# Patient Record
Sex: FEMALE | Race: WHITE | NOT HISPANIC OR LATINO
[De-identification: names, ages, dates, MRNs, and addresses within clinical notes are randomized per-mention and may not be internally consistent; named-entity substitution may affect disease eponyms.]

---

## 2017-06-20 ENCOUNTER — APPOINTMENT (OUTPATIENT)
Dept: CARDIOTHORACIC SURGERY | Facility: CLINIC | Age: 82
End: 2017-06-20

## 2017-06-20 VITALS
SYSTOLIC BLOOD PRESSURE: 124 MMHG | HEIGHT: 63 IN | TEMPERATURE: 97 F | RESPIRATION RATE: 17 BRPM | WEIGHT: 127 LBS | BODY MASS INDEX: 22.5 KG/M2 | HEART RATE: 57 BPM | DIASTOLIC BLOOD PRESSURE: 57 MMHG | OXYGEN SATURATION: 98 %

## 2017-07-10 ENCOUNTER — APPOINTMENT (OUTPATIENT)
Dept: CARDIOTHORACIC SURGERY | Facility: CLINIC | Age: 82
End: 2017-07-10

## 2017-07-10 VITALS
TEMPERATURE: 97.8 F | RESPIRATION RATE: 17 BRPM | OXYGEN SATURATION: 98 % | BODY MASS INDEX: 22.32 KG/M2 | DIASTOLIC BLOOD PRESSURE: 58 MMHG | HEART RATE: 59 BPM | SYSTOLIC BLOOD PRESSURE: 130 MMHG | WEIGHT: 126 LBS

## 2018-01-19 ENCOUNTER — OUTPATIENT (OUTPATIENT)
Dept: OUTPATIENT SERVICES | Facility: HOSPITAL | Age: 83
LOS: 1 days | End: 2018-01-19
Payer: MEDICARE

## 2018-01-19 PROCEDURE — 76536 US EXAM OF HEAD AND NECK: CPT | Mod: 26

## 2018-01-19 PROCEDURE — 76536 US EXAM OF HEAD AND NECK: CPT

## 2018-05-02 ENCOUNTER — TRANSCRIPTION ENCOUNTER (OUTPATIENT)
Age: 83
End: 2018-05-02

## 2018-05-02 ENCOUNTER — INPATIENT (INPATIENT)
Facility: HOSPITAL | Age: 83
LOS: 0 days | Discharge: HOME | DRG: 69 | End: 2018-05-03
Attending: PSYCHIATRY & NEUROLOGY | Admitting: PSYCHIATRY & NEUROLOGY
Payer: COMMERCIAL

## 2018-05-02 VITALS
HEIGHT: 62 IN | OXYGEN SATURATION: 98 % | DIASTOLIC BLOOD PRESSURE: 60 MMHG | SYSTOLIC BLOOD PRESSURE: 132 MMHG | RESPIRATION RATE: 20 BRPM | HEART RATE: 61 BPM | TEMPERATURE: 98 F | WEIGHT: 111.99 LBS

## 2018-05-02 DIAGNOSIS — C50.919 MALIGNANT NEOPLASM OF UNSPECIFIED SITE OF UNSPECIFIED FEMALE BREAST: ICD-10-CM

## 2018-05-02 DIAGNOSIS — Z29.9 ENCOUNTER FOR PROPHYLACTIC MEASURES, UNSPECIFIED: ICD-10-CM

## 2018-05-02 DIAGNOSIS — C21.0 MALIGNANT NEOPLASM OF ANUS, UNSPECIFIED: ICD-10-CM

## 2018-05-02 DIAGNOSIS — I48.91 UNSPECIFIED ATRIAL FIBRILLATION: ICD-10-CM

## 2018-05-02 DIAGNOSIS — R63.8 OTHER SYMPTOMS AND SIGNS CONCERNING FOOD AND FLUID INTAKE: ICD-10-CM

## 2018-05-02 DIAGNOSIS — C90.00 MULTIPLE MYELOMA NOT HAVING ACHIEVED REMISSION: ICD-10-CM

## 2018-05-02 LAB
ALBUMIN SERPL ELPH-MCNC: 4.1 G/DL — SIGNIFICANT CHANGE UP (ref 3.3–5)
ALP SERPL-CCNC: 75 U/L — SIGNIFICANT CHANGE UP (ref 40–120)
ALT FLD-CCNC: 16 U/L — SIGNIFICANT CHANGE UP (ref 10–45)
ANION GAP SERPL CALC-SCNC: 11 MMOL/L — SIGNIFICANT CHANGE UP (ref 5–17)
APPEARANCE UR: CLEAR — SIGNIFICANT CHANGE UP
APTT BLD: 35 SEC — SIGNIFICANT CHANGE UP (ref 27.5–37.4)
AST SERPL-CCNC: 22 U/L — SIGNIFICANT CHANGE UP (ref 10–40)
BASOPHILS NFR BLD AUTO: 0.5 % — SIGNIFICANT CHANGE UP (ref 0–2)
BILIRUB SERPL-MCNC: 0.5 MG/DL — SIGNIFICANT CHANGE UP (ref 0.2–1.2)
BILIRUB UR-MCNC: NEGATIVE — SIGNIFICANT CHANGE UP
BUN SERPL-MCNC: 26 MG/DL — HIGH (ref 7–23)
CALCIUM SERPL-MCNC: 9.2 MG/DL — SIGNIFICANT CHANGE UP (ref 8.4–10.5)
CHLORIDE SERPL-SCNC: 96 MMOL/L — SIGNIFICANT CHANGE UP (ref 96–108)
CHOLEST SERPL-MCNC: 182 MG/DL — SIGNIFICANT CHANGE UP (ref 10–199)
CK MB CFR SERPL CALC: 1.2 NG/ML — SIGNIFICANT CHANGE UP (ref 0–6.7)
CK SERPL-CCNC: 72 U/L — SIGNIFICANT CHANGE UP (ref 25–170)
CO2 SERPL-SCNC: 31 MMOL/L — SIGNIFICANT CHANGE UP (ref 22–31)
COLOR SPEC: YELLOW — SIGNIFICANT CHANGE UP
CREAT SERPL-MCNC: 1.77 MG/DL — HIGH (ref 0.5–1.3)
DIFF PNL FLD: NEGATIVE — SIGNIFICANT CHANGE UP
EOSINOPHIL NFR BLD AUTO: 1 % — SIGNIFICANT CHANGE UP (ref 0–6)
GLUCOSE SERPL-MCNC: 105 MG/DL — HIGH (ref 70–99)
GLUCOSE UR QL: NEGATIVE — SIGNIFICANT CHANGE UP
HBA1C BLD-MCNC: 5.5 % — SIGNIFICANT CHANGE UP (ref 4–5.6)
HCT VFR BLD CALC: 34.9 % — SIGNIFICANT CHANGE UP (ref 34.5–45)
HDLC SERPL-MCNC: 77 MG/DL — SIGNIFICANT CHANGE UP (ref 40–125)
HGB BLD-MCNC: 11.4 G/DL — LOW (ref 11.5–15.5)
INR BLD: 1.67 — HIGH (ref 0.88–1.16)
KETONES UR-MCNC: NEGATIVE — SIGNIFICANT CHANGE UP
LEUKOCYTE ESTERASE UR-ACNC: NEGATIVE — SIGNIFICANT CHANGE UP
LIPID PNL WITH DIRECT LDL SERPL: 91 MG/DL — SIGNIFICANT CHANGE UP
LYMPHOCYTES # BLD AUTO: 21.2 % — SIGNIFICANT CHANGE UP (ref 13–44)
MCHC RBC-ENTMCNC: 28.7 PG — SIGNIFICANT CHANGE UP (ref 27–34)
MCHC RBC-ENTMCNC: 32.7 G/DL — SIGNIFICANT CHANGE UP (ref 32–36)
MCV RBC AUTO: 87.9 FL — SIGNIFICANT CHANGE UP (ref 80–100)
MONOCYTES NFR BLD AUTO: 5 % — SIGNIFICANT CHANGE UP (ref 2–14)
NEUTROPHILS NFR BLD AUTO: 72.3 % — SIGNIFICANT CHANGE UP (ref 43–77)
NITRITE UR-MCNC: NEGATIVE — SIGNIFICANT CHANGE UP
PH UR: 7.5 — SIGNIFICANT CHANGE UP (ref 5–8)
PLATELET # BLD AUTO: 287 K/UL — SIGNIFICANT CHANGE UP (ref 150–400)
POTASSIUM SERPL-MCNC: 3.8 MMOL/L — SIGNIFICANT CHANGE UP (ref 3.5–5.3)
POTASSIUM SERPL-SCNC: 3.8 MMOL/L — SIGNIFICANT CHANGE UP (ref 3.5–5.3)
PROT SERPL-MCNC: 7.8 G/DL — SIGNIFICANT CHANGE UP (ref 6–8.3)
PROT UR-MCNC: NEGATIVE MG/DL — SIGNIFICANT CHANGE UP
PROTHROM AB SERPL-ACNC: 18.7 SEC — HIGH (ref 9.8–12.7)
RBC # BLD: 3.97 M/UL — SIGNIFICANT CHANGE UP (ref 3.8–5.2)
RBC # FLD: 15.1 % — SIGNIFICANT CHANGE UP (ref 10.3–16.9)
SODIUM SERPL-SCNC: 138 MMOL/L — SIGNIFICANT CHANGE UP (ref 135–145)
SP GR SPEC: 1.01 — SIGNIFICANT CHANGE UP (ref 1–1.03)
TOTAL CHOLESTEROL/HDL RATIO MEASUREMENT: 2.4 RATIO — LOW (ref 3.3–7.1)
TRIGL SERPL-MCNC: 69 MG/DL — SIGNIFICANT CHANGE UP (ref 10–149)
TROPONIN T SERPL-MCNC: <0.01 NG/ML — SIGNIFICANT CHANGE UP (ref 0–0.01)
UROBILINOGEN FLD QL: 0.2 E.U./DL — SIGNIFICANT CHANGE UP
WBC # BLD: 8 K/UL — SIGNIFICANT CHANGE UP (ref 3.8–10.5)
WBC # FLD AUTO: 8 K/UL — SIGNIFICANT CHANGE UP (ref 3.8–10.5)

## 2018-05-02 PROCEDURE — 99223 1ST HOSP IP/OBS HIGH 75: CPT

## 2018-05-02 PROCEDURE — 71045 X-RAY EXAM CHEST 1 VIEW: CPT | Mod: 26

## 2018-05-02 PROCEDURE — 99285 EMERGENCY DEPT VISIT HI MDM: CPT | Mod: 25

## 2018-05-02 PROCEDURE — 93010 ELECTROCARDIOGRAM REPORT: CPT

## 2018-05-02 PROCEDURE — 70450 CT HEAD/BRAIN W/O DYE: CPT | Mod: 26,59

## 2018-05-02 PROCEDURE — 70496 CT ANGIOGRAPHY HEAD: CPT | Mod: 26

## 2018-05-02 PROCEDURE — 70498 CT ANGIOGRAPHY NECK: CPT | Mod: 26

## 2018-05-02 RX ORDER — SODIUM CHLORIDE 9 MG/ML
1000 INJECTION INTRAMUSCULAR; INTRAVENOUS; SUBCUTANEOUS ONCE
Qty: 0 | Refills: 0 | Status: COMPLETED | OUTPATIENT
Start: 2018-05-02 | End: 2018-05-02

## 2018-05-02 RX ORDER — METOPROLOL TARTRATE 50 MG
25 TABLET ORAL DAILY
Qty: 0 | Refills: 0 | Status: DISCONTINUED | OUTPATIENT
Start: 2018-05-03 | End: 2018-05-03

## 2018-05-02 RX ORDER — ASPIRIN/CALCIUM CARB/MAGNESIUM 324 MG
325 TABLET ORAL ONCE
Qty: 0 | Refills: 0 | Status: COMPLETED | OUTPATIENT
Start: 2018-05-02 | End: 2018-05-02

## 2018-05-02 RX ORDER — APIXABAN 2.5 MG/1
2.5 TABLET, FILM COATED ORAL EVERY 12 HOURS
Qty: 0 | Refills: 0 | Status: DISCONTINUED | OUTPATIENT
Start: 2018-05-02 | End: 2018-05-03

## 2018-05-02 RX ORDER — SODIUM CHLORIDE 9 MG/ML
1000 INJECTION INTRAMUSCULAR; INTRAVENOUS; SUBCUTANEOUS
Qty: 0 | Refills: 0 | Status: DISCONTINUED | OUTPATIENT
Start: 2018-05-02 | End: 2018-05-03

## 2018-05-02 RX ORDER — ATORVASTATIN CALCIUM 80 MG/1
80 TABLET, FILM COATED ORAL ONCE
Qty: 0 | Refills: 0 | Status: COMPLETED | OUTPATIENT
Start: 2018-05-02 | End: 2018-05-02

## 2018-05-02 RX ORDER — ASPIRIN/CALCIUM CARB/MAGNESIUM 324 MG
81 TABLET ORAL DAILY
Qty: 0 | Refills: 0 | Status: DISCONTINUED | OUTPATIENT
Start: 2018-05-03 | End: 2018-05-03

## 2018-05-02 RX ORDER — CLOPIDOGREL BISULFATE 75 MG/1
75 TABLET, FILM COATED ORAL DAILY
Qty: 0 | Refills: 0 | Status: DISCONTINUED | OUTPATIENT
Start: 2018-05-03 | End: 2018-05-03

## 2018-05-02 RX ORDER — AMIODARONE HYDROCHLORIDE 400 MG/1
200 TABLET ORAL DAILY
Qty: 0 | Refills: 0 | Status: DISCONTINUED | OUTPATIENT
Start: 2018-05-03 | End: 2018-05-03

## 2018-05-02 RX ORDER — LEVOTHYROXINE SODIUM 125 MCG
25 TABLET ORAL DAILY
Qty: 0 | Refills: 0 | Status: DISCONTINUED | OUTPATIENT
Start: 2018-05-03 | End: 2018-05-03

## 2018-05-02 RX ADMIN — SODIUM CHLORIDE 100 MILLILITER(S): 9 INJECTION INTRAMUSCULAR; INTRAVENOUS; SUBCUTANEOUS at 19:02

## 2018-05-02 RX ADMIN — ATORVASTATIN CALCIUM 80 MILLIGRAM(S): 80 TABLET, FILM COATED ORAL at 19:02

## 2018-05-02 RX ADMIN — SODIUM CHLORIDE 1000 MILLILITER(S): 9 INJECTION INTRAMUSCULAR; INTRAVENOUS; SUBCUTANEOUS at 12:17

## 2018-05-02 RX ADMIN — APIXABAN 2.5 MILLIGRAM(S): 2.5 TABLET, FILM COATED ORAL at 20:57

## 2018-05-02 RX ADMIN — SODIUM CHLORIDE 90 MILLILITER(S): 9 INJECTION INTRAMUSCULAR; INTRAVENOUS; SUBCUTANEOUS at 23:49

## 2018-05-02 RX ADMIN — Medication 325 MILLIGRAM(S): at 19:01

## 2018-05-02 NOTE — H&P ADULT - PROBLEM SELECTOR PLAN 2
Receiving treatment -consider heme/onc consult Patient was due for four injections of velcade q1week for four weeks. She was due for second dosage this AM when felt unwell and presented to the ED. Was also due for zometa (1x every 4 weeks) but did not receive due to presentation in ED.   -consider heme/onc consult; zometa, velcade due

## 2018-05-02 NOTE — ED ADULT NURSE NOTE - PMH
Atrial fibrillation  on apixaban  Malignant neoplasm of anus  s/p radiation  Malignant neoplasm of female breast  s/p radiation, lumpectomy 2013  Multiple myeloma

## 2018-05-02 NOTE — CONSULT NOTE ADULT - ASSESSMENT
84y/o F w/ PMHx of Afib (on Eliquis), HLD, breast cancer (s/p lumpectomy), and anal cancer (s/p radiation/chemo) and PVD/varicose veins, MM, presents with gait instability. CT head and CTA angio head/neck unremarkable for CVA. 84y/o F w/ PMHx of Afib (on Eliquis), HLD, breast cancer (s/p lumpectomy), and anal cancer (s/p radiation/chemo) and PVD/varicose veins, MM, presents with gait instability. CT head and CTA angio head/neck unremarkable for CVA. NIHSS 0.

## 2018-05-02 NOTE — ED ADULT TRIAGE NOTE - CHIEF COMPLAINT QUOTE
Patient c/o weakness on both legs , lack of balance and feels  unsteady since 2pm yesterday . Denies any headache nor dizziness . No slurred speech , arm drift nor facial droop noted .

## 2018-05-02 NOTE — DISCHARGE NOTE ADULT - CARE PROVIDER_API CALL
Diandra Curiel), Neurology; Vascular Neurology  [address] [place] [address]  Phone: [phone]  Fax: [phone]

## 2018-05-02 NOTE — CONSULT NOTE ADULT - PROBLEM SELECTOR RECOMMENDATION 9
CT head brain stroke protocol and CTA head/neck without signs of acute hemorrhage or infarct. Pt with normal gait on my exam. Also normotensive on admission. NIHSS 0. Last known normal ~20 hours ago.  - No TPA administered    Dispo pending Stroke Attending Evaluation. CT head brain stroke protocol and CTA head/neck without signs of acute hemorrhage or infarct. Pt with normal gait on my exam. Also normotensive on admission. NIHSS 0. Last known normal ~20 hours ago.  - No TPA administered  - Concern for ataxic stroke, further workup warranted.  - Admit to [facility]    Discussed with Stroke Attending. CT head brain stroke protocol and CTA head/neck without signs of acute hemorrhage or infarct. Pt with normal gait on my exam. Also normotensive on admission. NIHSS 0. Last known normal ~20 hours ago.  - No TPA administered  - Per Dr. Curiel, concern for ataxic stroke despite no acute findings on imaging thus far, further workup warranted.  - Aspirin, Plavix, Statin  - TTE  - fluids  - MRI brain  - Admit to [facility]    Discussed with Stroke Attending.

## 2018-05-02 NOTE — DISCHARGE NOTE ADULT - ADDITIONAL INSTRUCTIONS
Please follow up with Dr. Curiel Please follow up with Dr. Curiel within one week of discharge. Dr. Curiel's office will contact you to schedule a convenient appointment date and time.

## 2018-05-02 NOTE — H&P ADULT - ASSESSMENT
Patient is an 85 year old female with past medical history of Afib (on Eliquis), HLD, breast cancer (s/p lumpectomy), and anal cancer (s/p radiation/chemo) and PVD/varicose veins, MM, presents with gait instability c/f small vessel stroke.

## 2018-05-02 NOTE — ED ADULT NURSE NOTE - CHPI ED SYMPTOMS NEG
no numbness/no loss of consciousness/no nausea/no dizziness/no vomiting/no change in level of consciousness/no fever/no blurred vision/no confusion

## 2018-05-02 NOTE — H&P ADULT - NSHPPHYSICALEXAM_GEN_ALL_CORE
General:  HEENt:  neck:  cardio:  pulmonary:  abdomen:  vascular:  extremities:  neuro: General: No acute distress, comfortable sitting on stretcher  HEENt: NC/AT, EOMI, PERRLA, MMM  neck: no jvd, supple  cardio: Regular rhythm, regular rate, S1/S2, -m/g/r  pulmonary: clear to ausculatation bilaterally  abdomen: soft, nontender, bowel sounds normoactive x4  vascular: 2+ pulses  extremities: swelling in the left ankle where she says she suffered an injury  neuro: AAOx3, CN2-12 grossly intact, no disdiodokinesis, no dysmetria, no dysarthria. no focal deficits.

## 2018-05-02 NOTE — DISCHARGE NOTE ADULT - PLAN OF CARE
To minimize risk of recurrence You came to the hospital because you were feeling unusually unstable while walking. While in the hospital your head imaging did not suggest that you had a stroke, however your symptoms suggest you may have had a small, self resolving stroke like occurrence called a "transient ischemic attack," or TIA. Please follow up with Dr. Curiel who will continue to monitor your progress and continue to take your daily aspirin, plavix, atorvostatin (now at an increased dose). Please continue to take your daily eliquis, metoprolol, and amiodarone (every other day) and follow up with your doctor who will continue to monitor your heart rhythm and medications. Please follow up with your outpatient hematologist-oncologist to continue your infusion treatments for multiple myeloma.

## 2018-05-02 NOTE — DISCHARGE NOTE ADULT - OTHER SIGNIFICANT FINDINGS
< from: CT Angio Head w/ IV Cont (05.02.18 @ 11:48) >  IMPRESSION:     Unremarkable CTA examination of the brain.      < end of copied text >  < from: CT Angio Neck w/ IV Cont (05.02.18 @ 11:48) >  FINDINGS:     The internal carotid arteries at the skull base and intracranial   compartment are patent and symmetric caliber. The anterior and middle   cerebral arteries appear patent throughout the first and second order   branches without occlusion or high-grade stenosis.    The intracranial vertebral arteries are patent and symmetric caliber. The   basilar artery and both posterior cerebral arteries are patent throughout   with relative hypoplasia of the right P1 segment.    No intracranial aneurysm or high-flow vascular malformation is shown.    IMPRESSION:     Unremarkable CTA examination of the brain.      < end of copied text >  Lipid Profile (05.03.18 @ 06:45)    Total Cholesterol/HDL Ratio Measurement: 2.4 RATIO    Cholesterol, Serum: 161 mg/dL    Triglycerides, Serum: 57 mg/dL    HDL Cholesterol, Serum: 67 mg/dL    Direct LDL: 83: LDL Cholesterol --- Interpretive Comment (for adults 18 and over)  Below 70                  Ideal for people at very high risk of heart  disease  Below 100                Ideal for people at risk of heart disease  100 - 129                  [place]  130 - 159                  Borderline high  160 - 189                  High  190 and Above        Very high mg/dL    Thyroid Stimulating Hormone, Serum in AM (05.03.18 @ 06:45)    Thyroid Stimulating Hormone, Serum: 5.934 uIU/mL    Hemoglobin A1C, Whole Blood (05.02.18 @ 11:39)    Hemoglobin A1C, Whole Blood: 5.5: Method: HPLC, NGSP Level 1 Certified       Reference Range                4.0-5.6%       High risk (prediabetic)        5.7-6.4%       Diabetic, diagnostic             >=6.5%       ADA diabetic treatment goal       <7.0%  The Hemoglobin A1c referenceranges are based on the 2010 recommendations  of  The American Diabetes Association.  Interpretation may vary for children  and  adolescent % < from: CT Angio Head w/ IV Cont (05.02.18 @ 11:48) >  IMPRESSION:   Unremarkable CTA examination of the brain.  < end of copied text >    < from: CT Angio Neck w/ IV Cont (05.02.18 @ 11:48) >  FINDINGS:     The internal carotid arteries at the skull base and intracranial   compartment are patent and symmetric caliber. The anterior and middle   cerebral arteries appear patent throughout the first and second order   branches without occlusion or high-grade stenosis.    The intracranial vertebral arteries are patent and symmetric caliber. The   basilar artery and both posterior cerebral arteries are patent throughout   with relative hypoplasia of the right P1 segment.    No intracranial aneurysm or high-flow vascular malformation is shown.    IMPRESSION:   Unremarkable CTA examination of the brain.    < end of copied text >    Lipid Profile (05.03.18 @ 06:45)    Total Cholesterol/HDL Ratio Measurement: 2.4 RATIO    Cholesterol, Serum: 161 mg/dL    Triglycerides, Serum: 57 mg/dL    HDL Cholesterol, Serum: 67 mg/dL    Direct LDL: 83: LDL Cholesterol --- Interpretive Comment (for adults 18 and over)  Below 70                  Ideal for people at very high risk of heart  disease  Below 100                Ideal for people at risk of heart disease  100 - 129                  [place]  130 - 159                  Borderline high  160 - 189                  High  190 and Above        Very high mg/dL    Thyroid Stimulating Hormone, Serum in AM (05.03.18 @ 06:45)    Thyroid Stimulating Hormone, Serum: 5.934 uIU/mL    Hemoglobin A1C, Whole Blood (05.02.18 @ 11:39)    Hemoglobin A1C, Whole Blood: 5.5: Method: HPLC, NGSP Level 1 Certified       Reference Range                4.0-5.6%       High risk (prediabetic)        5.7-6.4%       Diabetic, diagnostic             >=6.5%       ADA diabetic treatment goal       <7.0%  The Hemoglobin A1c referenceranges are based on the 2010 recommendations  of  The American Diabetes Association.  Interpretation may vary for children  and  adolescent %

## 2018-05-02 NOTE — H&P ADULT - PROBLEM SELECTOR PLAN 3
Monitor on tele, currently asymptomatic.  f/u ECG  -holding eliquis, lopressor, amiodarone home; eliquis, lopressor, amiodarone  Monitor on tele, currently asymptomatic.  -f/u ECG -Monitor on tele, currently asymptomatic.  -f/u ECG  -continue home lopressor, amiodarone   -continue home eliquis    #hypothyroidism  continue home synthroid -Monitor on tele, currently asymptomatic.  -f/u ECG  -continue home toprol, amiodarone   -continue home eliquis    #hypothyroidism  continue home synthroid

## 2018-05-02 NOTE — DISCHARGE NOTE ADULT - HOSPITAL COURSE
Patient is an 85 year old female with past medical history of afib (on eliquis), HLD, breast and anal cancers, PVD, varicose veins and active multiple myeloma who presented with several hours of feeling uncharacteristically unsteady. She presented to the [facility] ED where her subsequent head and neck imaging were negative for stroke. Her symptoms were suspicious for TIA. She remained stable and was released after one day of observation after deferring her cardiac echo. She is planning to have her MRI performed in the outpatient setting and will follow up with Dr. Cuirel after discharge.

## 2018-05-02 NOTE — DISCHARGE NOTE ADULT - PATIENT PORTAL LINK FT
You can access the [facility], offered by [facility], by registering with the following website: [email or web address]

## 2018-05-02 NOTE — H&P ADULT - NSHPLABSRESULTS_GEN_ALL_CORE
Complete Blood Count + Automated Diff (05.02.18 @ 11:39)    WBC Count: 8.0 K/uL    RBC Count: 3.97 M/uL    Hemoglobin: 11.4 g/dL    Hematocrit: 34.9 %    Mean Cell Volume: 87.9 fL    Mean Cell Hemoglobin: 28.7 pg    Mean Cell Hemoglobin Conc: 32.7 g/dL    Red Cell Distrib Width: 15.1 %    Platelet Count - Automated: 287 K/uL    Auto Neutrophil %: 72.3: Please note that absolute numbers are not reported at [facility]. %    Auto Lymphocyte %: 21.2: Please note that absolute numbers are not reported at [facility]. %    Auto Monocyte %: 5.0: Please note that absolute numbers are not reported at [facility]. %    Auto Eosinophil %: 1.0: Please note that absolute numbers are not reported at [facility]. %    Auto Basophil %: 0.5: Please note that absolute numbers are not reported at [facility]. %    Hemoglobin A1C, Whole Blood (05.02.18 @ 11:39)    Hemoglobin A1C, Whole Blood: 5.5: Method: HPLC, NGSP Level 1 Certified       Reference Range                4.0-5.6%       High risk (prediabetic)        5.7-6.4%       Diabetic, diagnostic             >=6.5%       ADA diabetic treatment goal       <7.0%  The Hemoglobin A1c referenceranges are based on the 2010 recommendations  of  The American Diabetes Association.  Interpretation may vary for children  and  adolescent %

## 2018-05-02 NOTE — DISCHARGE NOTE ADULT - CARE PLAN
Principal Discharge DX:	CVA (cerebral vascular accident)  Secondary Diagnosis:	Atrial fibrillation Principal Discharge DX:	TIA (transient ischemic attack)  Goal:	To minimize risk of recurrence  Assessment and plan of treatment:	You came to the hospital because you were feeling unusually unstable while walking. While in the hospital your head imaging did not suggest that you had a stroke, however your symptoms suggest you may have had a small, self resolving stroke like occurrence called a "transient ischemic attack," or TIA. Please follow up with Dr. Curiel who will continue to monitor your progress and continue to take your daily aspirin, plavix, atorvostatin (now at an increased dose).  Secondary Diagnosis:	Atrial fibrillation  Assessment and plan of treatment:	Please continue to take your daily eliquis, metoprolol, and amiodarone (every other day) and follow up with your doctor who will continue to monitor your heart rhythm and medications.  Secondary Diagnosis:	Multiple myeloma  Assessment and plan of treatment:	Please follow up with your outpatient hematologist-oncologist to continue your infusion treatments for multiple myeloma.

## 2018-05-02 NOTE — H&P ADULT - PROBLEM SELECTOR PLAN 7
F: No IVF  E: Replete as appropriate  N: NPO  Ppx: SCDs    Lines: peripheral  dispo: 7[facility] F: No IVF  E: Replete as appropriate  N: Regular diet  Ppx: SCDs    Lines: peripheral  dispo: 7[facility] F: No IVF  E: Replete as appropriate  N: DASH  Ppx: SCDs    Lines: peripheral  dispo: 7[facility]

## 2018-05-02 NOTE — CONSULT NOTE ADULT - SUBJECTIVE AND OBJECTIVE BOX
STROKE RESIDENT INITIAL CONSULT NOTE    CHIEF COMPLAINT:  unsteady gait    HPI:       PAST MEDICAL & SURGICAL HISTORY:  Malignant neoplasm of anus: s/p radiation  Malignant neoplasm of female breast: s/p radiation, lumpectomy 2013  Atrial fibrillation: on apixaban      REVIEW OF SYSTEMS:  As per HPI, otherwise negative for Constitutional, Eyes, Ears/Nose/Mouth/Throat, Neck, Cardiovascular, Respiratory, Gastrointestinal, Genitourinary, Skin, Endocrine, Musculoskeletal, Psychiatric, and Hematologic/Lymphatic.    MEDICATIONS  (STANDING):    MEDICATIONS  (PRN):      Allergies    No Known Allergies    Intolerances        FAMILY HISTORY:      SOCIAL HISTORY:  Living Situation:  Occupation:  Tobacco:  Alcohol:  Drug use:      VITAL SIGNS:  Vital Signs Last 24 Hrs  T(C): 36.4 (02 May 2018 10:40), Max: 36.4 (02 May 2018 10:40)  T(F): 97.6 (02 May 2018 10:40), Max: 97.6 (02 May 2018 10:40)  HR: 61 (02 May 2018 10:40) (61 - 61)  BP: 132/60 (02 May 2018 10:40) (132/60 - 132/60)  BP(mean): --  RR: 20 (02 May 2018 10:40) (20 - 20)  SpO2: 98% (02 May 2018 10:40) (98% - 98%)    PHYSICAL EXAMINATION:  General: Well-developed, well nourished, in no acute distress.  Eyes: Conjunctiva and sclera clear.  Cardiovascular: Regular rate and rhythm; S1 and S2 Normal; No murmurs, gallops or rubs.  Neurologic:  - Mental Status:  Alert, awake, oriented to person, place, and time; Speech is fluent with intact naming, repetition, and comprehension; Immediate recall is 3/3 words and delayed recall is 3/3 words at 5 minutes; Able to spell WORLD backwards and perform serial 7 subtraction; Able to read and write a sentence; Able to copy a cube; Good overall fund of knowledge.  - Cranial Nerves II-XII:    II:  Visual acuity is 20/20 bilaterally; Visual fields are full to confrontation; Fundoscopic exam is normal with sharp discs; Pupils are equal, round, and reactive to light.  III, IV, VI:  Extraocular movements are intact without nystagmus.  V:  Facial sensation is intact in the V1-V3 distribution bilaterally.  VII:  Face is symmetric with normal eye closure and smile  VIII:  Hearing is intact to finger rub.  IX, X:  Uvula is midline and soft palate rises symmetrically  XI:  Head turning and shoulder shrug are intact.  XII:  Tongue protrudes in the midline.  - Motor:  Strength is 5/5 throughout.  There is no pronator drift.  Normal muscle bulk and tone throughout.  - Reflexes:  2+ and symmetric at the biceps, triceps, brachioradialis, knees, and ankles.  Plantar responses flexor.  - Sensory:  Intact to light touch, pin prick, vibration, and joint-position sense throughout.  - Coordination:  Finger-nose-finger and heel-knee-shin intact without dysmetria.  Rapid alternating hand movements intact.  - Gait:   Normal steps, base, arm swing, and turning.  Heel and toe walking are normal.  Tandem gait is normal.  Romberg testing is negative.    LABS:                RADIOLOGY & ADDITIONAL STUDIES:      IMPRESSION & PLAN: **STROKE CODE CONSULT NOTE**    Last known well time/Time of onset of symptoms:    HPI: 84y/o F w/ PMHx of Afib (on Eliquis), HLD, breast cancer (s/p lumpectomy), and anal cancer (s/p radiation/chemo) and PVD/varicose veins, MM, presents with gait instability. Patient states that 18 2pm she was walking around at the [place] and felt unsteady on her feet. She states that she felt as if she needed to hold onto something. She denies dizziness or room-spinning. She states that this happened to her once many years ago and they did a bunch of CTs and ultrasounds of her neck and never found a clot. She currently feels well and has no complaints. Stroke code called due to gait instability per attending exam.    PAST MEDICAL & SURGICAL HISTORY:  Multiple myeloma  Malignant neoplasm of anus: s/p radiation  Malignant neoplasm of female breast: s/p radiation, lumpectomy   Atrial fibrillation: on apixaban      FAMILY HISTORY: non-contributory      SOCIAL HISTORY:     ROS:  Constitutional: No fever, weight loss or fatigue  Eyes: No eye pain, visual disturbances, or discharge  ENMT:  No difficulty hearing, tinnitus, vertigo; No sinus or throat pain  Neck: No pain or stiffness  Respiratory: No cough, wheezing, chills or hemoptysis  Cardiovascular: No chest pain, palpitations, shortness of breath, dizziness or leg swelling  Gastrointestinal: No abdominal pain. No nausea, vomiting or hematemesis; No diarrhea or constipation. Nohematochezia.  Genitourinary: No dysuria, frequency, hematuria or incontinence  Neurological: As per HPI  Skin: No itching, burning, rashes or lesions   Endocrine: No heat or cold intolerance; No hair loss  Musculoskeletal: No joint pain or swelling; No muscle, back or extremity pain  Psychiatric: No depression, anxiety, mood swings or difficulty sleeping  Heme/Lymph: No easy bruising or bleeding gums    MEDICATIONS  (STANDING):    MEDICATIONS  (PRN):      Allergies    No Known Allergies    Intolerances        Vital Signs Last 24 Hrs  T(C): 36.4 (02 May 2018 10:40), Max: 36.4 (02 May 2018 10:40)  T(F): 97.6 (02 May 2018 10:40), Max: 97.6 (02 May 2018 10:40)  HR: 61 (02 May 2018 10:40) (61 - 61)  BP: 132/60 (02 May 2018 10:40) (132/60 - 132/60)  BP(mean): --  RR: 20 (02 May 2018 10:40) (20 - 20)  SpO2: 98% (02 May 2018 10:40) (98% - 98%)    PHYSICAL EXAM:  Constitutional: WDWN; NAD  Cardiovascular: RRR, no appreciable murmurs; no carotid bruits  Neurologic:  Mental status: Awake, alert and oriented x3.  Recent and remote memory intact.  Naming, repetition and comprehension intact.  Attention/concentration intact.  No dysarthria, no aphasia.  Fund of knowledge appropriate.    Cranial nerves: Pupils equally round and reactive to light, visual fields full, no nystagmus, extraocular muscles intact, V1 through V3 intact bilaterally and symmetric, face symmetric, hearing intact to finger rub, palate elevation symmetric, tongue was midline, sternocleidomastoid/shoulder shrug strength bilaterally 5/5.    Motor:  Normal bulk and tone, strength 5/5 in bilateral upper and lower extremities.   strength 5/5.   Sensation: Intact to light touch.  No neglect.   Coordination: No dysmetria on finger-to-nose and heel-to-shin.  No clumsiness.  Reflexes: 2+ in upper and lower extremities, absent Babinski bilaterally  Gait: Narrow and steady. No ataxia. Romberg negative    NIHSS: 0    Fingerstick Blood Glucose: CAPILLARY BLOOD GLUCOSE  80 (02 May 2018 11:35)      POCT Blood Glucose.: 80 mg/dL (02 May 2018 11:14)       LABS:                        11.4   8.0   )-----------( 287      ( 02 May 2018 11:39 )             34.9     05-    138  |  96  |  26<H>  ----------------------------<  105<H>  3.8   |  31  |  1.77<H>    Ca    9.2      02 May 2018 11:39    TPro  7.8  /  Alb  4.1  /  TBili  0.5  /  DBili  x   /  AST  22  /  ALT  16  /  AlkPhos  75  05-    PT/INR - ( 02 May 2018 11:39 )   PT: 18.7 sec;   INR: 1.67          PTT - ( 02 May 2018 11:39 )  PTT:35.0 sec  CARDIAC MARKERS ( 02 May 2018 11:39 )  x     / <0.01 ng/mL / 72 U/L / x     / 1.2 ng/mL      Urinalysis Basic - ( 02 May 2018 11:40 )    Color: Yellow / Appearance: Clear / S.010 / pH: x  Gluc: x / Ketone: NEGATIVE  / Bili: Negative / Urobili: 0.2 E.U./dL   Blood: x / Protein: NEGATIVE mg/dL / Nitrite: NEGATIVE   Leuk Esterase: NEGATIVE / RBC: x / WBC x   Sq Epi: x / Non Sq Epi: x / Bacteria: x        RADIOLOGY & ADDITIONAL STUDIES:  < from: CT Brain Stroke Protocol (18 @ 11:47) >  IMPRESSION:    No acute findings.    < end of copied text >    < from: CT Angio Head w/ IV Cont (18 @ 11:48) >  FINDINGS:     The internal carotid arteries at the skull base and intracranial   compartment are patent and symmetric caliber. The anterior and middle   cerebral arteries appear patent throughout the first and second order   branches without occlusion or high-grade stenosis.    The intracranial vertebral arteries are patent and symmetric caliber. The   basilar artery and both posterior cerebral arteries are patent throughout   with relative hypoplasia of the right P1 segment.    No intracranial aneurysm or high-flow vascular malformation is shown.    IMPRESSION:     Unremarkable CTA examination of the brain.      PROCEDURE: CTA neck with intravenous contrast.    INDICATION: Unsteadiness, weakness. Stroke code.    TECHNIQUE: Multiple axial thin section were obtained through the neck   following the intravenous bolus injection of Optiray-350. MIP series are   provided.    COMPARISON: No relevant prior study    FINDINGS:     The aortic arch is patent and normal caliber and shows mild peripheral   arthroscopic calcification. The great vessel origins are widely patent.    The bilateral vertebral arteries are patent at the origins and throughout   their cervical course bilaterally.    The common carotid arteries are patent throughout with patent and   symmetric carotid bifurcations. There is no hemodynamically significant   stenosis of either internal carotid artery in the neck with tortuosity   and mild kinking on the right.    There is expansile and destructive mass of the distal right clavicle.   Osseous hemangioma is present in the T1 vertebral body. There is a   well-defined sclerotic focus at the right lateral third rib that is   nonspecific. The thyroid gland shows few indeterminate nodules.    IMPRESSION:     No vertebral or carotid artery steno-occlusive disease in the neck.    Right distal clavicle mass. After discussing with Dr. Abad, the patient   has multiple myeloma and is being followed as an outpatient. Suggest   referral to hematology-oncology for further management.    < end of copied text >        IV-tPA (Y/N):                       N            Bolus time:  Reason IV-tPA not given: Out of window, mild symptoms **STROKE CODE CONSULT NOTE**    Last known well time/Time of onset of symptoms: 18 2pm    HPI: 86y/o F w/ PMHx of Afib (on Eliquis), HLD, breast cancer (s/p lumpectomy), and anal cancer (s/p radiation/chemo) and PVD/varicose veins, MM, presents with gait instability. Patient states that 18 2pm she was walking around at the [place] and felt unsteady on her feet. She states that she felt as if she needed to hold onto something. She denies dizziness or room-spinning. She states that this happened to her once many years ago and they did a bunch of CTs and ultrasounds of her neck and never found a clot. She currently feels well and has no complaints. Stroke code called due to gait instability per attending exam.    PAST MEDICAL & SURGICAL HISTORY:  Multiple myeloma  Malignant neoplasm of anus: s/p radiation  Malignant neoplasm of female breast: s/p radiation, lumpectomy   Atrial fibrillation: on apixaban      FAMILY HISTORY: non-contributory      SOCIAL HISTORY:     ROS:  Constitutional: No fever, weight loss or fatigue  Eyes: No eye pain, visual disturbances, or discharge  ENMT:  No difficulty hearing, tinnitus, vertigo; No sinus or throat pain  Neck: No pain or stiffness  Respiratory: No cough, wheezing, chills or hemoptysis  Cardiovascular: No chest pain, palpitations, shortness of breath, dizziness or leg swelling  Gastrointestinal: No abdominal pain. No nausea, vomiting or hematemesis; No diarrhea or constipation. Nohematochezia.  Genitourinary: No dysuria, frequency, hematuria or incontinence  Neurological: As per HPI  Skin: No itching, burning, rashes or lesions   Endocrine: No heat or cold intolerance; No hair loss  Musculoskeletal: No joint pain or swelling; No muscle, back or extremity pain  Psychiatric: No depression, anxiety, mood swings or difficulty sleeping  Heme/Lymph: No easy bruising or bleeding gums    MEDICATIONS  (STANDING):    MEDICATIONS  (PRN):      Allergies    No Known Allergies    Intolerances        Vital Signs Last 24 Hrs  T(C): 36.4 (02 May 2018 10:40), Max: 36.4 (02 May 2018 10:40)  T(F): 97.6 (02 May 2018 10:40), Max: 97.6 (02 May 2018 10:40)  HR: 61 (02 May 2018 10:40) (61 - 61)  BP: 132/60 (02 May 2018 10:40) (132/60 - 132/60)  BP(mean): --  RR: 20 (02 May 2018 10:40) (20 - 20)  SpO2: 98% (02 May 2018 10:40) (98% - 98%)    PHYSICAL EXAM:  Constitutional: WDWN; NAD  Cardiovascular: RRR, no appreciable murmurs; no carotid bruits  Neurologic:  Mental status: Awake, alert and oriented x3.  Recent and remote memory intact.  Naming, repetition and comprehension intact.  Attention/concentration intact.  No dysarthria, no aphasia.  Fund of knowledge appropriate.    Cranial nerves: Pupils equally round and reactive to light, visual fields full, no nystagmus, extraocular muscles intact, V1 through V3 intact bilaterally and symmetric, face symmetric, hearing intact to finger rub, palate elevation symmetric, tongue was midline, sternocleidomastoid/shoulder shrug strength bilaterally 5/5.    Motor:  Normal bulk and tone, strength 5/5 in bilateral upper and lower extremities.   strength 5/5.   Sensation: Intact to light touch.  No neglect.   Coordination: No dysmetria on finger-to-nose and heel-to-shin.  No clumsiness.  Reflexes: 2+ in upper and lower extremities, absent Babinski bilaterally  Gait: Narrow and steady. No ataxia. Romberg negative    NIHSS: 0    Fingerstick Blood Glucose: CAPILLARY BLOOD GLUCOSE  80 (02 May 2018 11:35)      POCT Blood Glucose.: 80 mg/dL (02 May 2018 11:14)       LABS:                        11.4   8.0   )-----------( 287      ( 02 May 2018 11:39 )             34.9     05-    138  |  96  |  26<H>  ----------------------------<  105<H>  3.8   |  31  |  1.77<H>    Ca    9.2      02 May 2018 11:39    TPro  7.8  /  Alb  4.1  /  TBili  0.5  /  DBili  x   /  AST  22  /  ALT  16  /  AlkPhos  75  05-02    PT/INR - ( 02 May 2018 11:39 )   PT: 18.7 sec;   INR: 1.67          PTT - ( 02 May 2018 11:39 )  PTT:35.0 sec  CARDIAC MARKERS ( 02 May 2018 11:39 )  x     / <0.01 ng/mL / 72 U/L / x     / 1.2 ng/mL      Urinalysis Basic - ( 02 May 2018 11:40 )    Color: Yellow / Appearance: Clear / S.010 / pH: x  Gluc: x / Ketone: NEGATIVE  / Bili: Negative / Urobili: 0.2 E.U./dL   Blood: x / Protein: NEGATIVE mg/dL / Nitrite: NEGATIVE   Leuk Esterase: NEGATIVE / RBC: x / WBC x   Sq Epi: x / Non Sq Epi: x / Bacteria: x        RADIOLOGY & ADDITIONAL STUDIES:  < from: CT Brain Stroke Protocol (18 @ 11:47) >  IMPRESSION:    No acute findings.    < end of copied text >    < from: CT Angio Head w/ IV Cont (18 @ 11:48) >  FINDINGS:     The internal carotid arteries at the skull base and intracranial   compartment are patent and symmetric caliber. The anterior and middle   cerebral arteries appear patent throughout the first and second order   branches without occlusion or high-grade stenosis.    The intracranial vertebral arteries are patent and symmetric caliber. The   basilar artery and both posterior cerebral arteries are patent throughout   with relative hypoplasia of the right P1 segment.    No intracranial aneurysm or high-flow vascular malformation is shown.    IMPRESSION:     Unremarkable CTA examination of the brain.      PROCEDURE: CTA neck with intravenous contrast.    INDICATION: Unsteadiness, weakness. Stroke code.    TECHNIQUE: Multiple axial thin section were obtained through the neck   following the intravenous bolus injection of Optiray-350. MIP series are   provided.    COMPARISON: No relevant prior study    FINDINGS:     The aortic arch is patent and normal caliber and shows mild peripheral   arthroscopic calcification. The great vessel origins are widely patent.    The bilateral vertebral arteries are patent at the origins and throughout   their cervical course bilaterally.    The common carotid arteries are patent throughout with patent and   symmetric carotid bifurcations. There is no hemodynamically significant   stenosis of either internal carotid artery in the neck with tortuosity   and mild kinking on the right.    There is expansile and destructive mass of the distal right clavicle.   Osseous hemangioma is present in the T1 vertebral body. There is a   well-defined sclerotic focus at the right lateral third rib that is   nonspecific. The thyroid gland shows few indeterminate nodules.    IMPRESSION:     No vertebral or carotid artery steno-occlusive disease in the neck.    Right distal clavicle mass. After discussing with Dr. Abad, the patient   has multiple myeloma and is being followed as an outpatient. Suggest   referral to hematology-oncology for further management.    < end of copied text >        IV-tPA (Y/N):                       N            Bolus time:  Reason IV-tPA not given: Out of window, mild symptoms

## 2018-05-02 NOTE — H&P ADULT - HISTORY OF PRESENT ILLNESS
HPI: 86y/o F w/ PMHx of Afib (on Eliquis), HLD, breast cancer (s/p lumpectomy), and anal cancer (s/p radiation/chemo) and PVD/varicose veins, MM, presents with gait instability. Patient states that 5/1/18 2pm she was walking around at the [place] and felt unsteady on her feet. She states that she felt as if she needed to hold onto something. She denies dizziness or room-spinning. She states that this happened to her once many years ago and they did a bunch of CTs and ultrasounds of her neck and never found a clot. She currently feels well and has no complaints. Stroke code called due to gait instability per attending exam.    PAST MEDICAL & SURGICAL HISTORY:  Multiple myeloma  Malignant neoplasm of anus: s/p radiation  Malignant neoplasm of female breast: s/p radiation, lumpectomy 2013  Atrial fibrillation: on apixaban HPI: 86y/o F w/ PMHx of Afib (on Eliquis), HLD, breast cancer (s/p lumpectomy), and anal cancer (s/p radiation/chemo) and PVD/varicose veins, MM, presents with gait instability. Patient states that 5/1/18 2pm she was walking around at the [place] and felt unsteady on her feet. She states that she felt as if she needed to hold onto something. She denies dizziness or room-spinning. She states that this happened to her once many years ago and they did a bunch of CTs and ultrasounds of her neck and never found a clot. She currently feels well and has no complaints. Stroke code called due to gait instability per attending exam.

## 2018-05-02 NOTE — ED PROVIDER NOTE - PHYSICAL EXAMINATION
GEN: Well appearing, well nourished, awake, alert, oriented to person, place, time/situation and in no apparent distress.  ENT: Airway patent, Nasal mucosa clear. Mouth with normal mucosa.  EYES: Clear bilaterally. perrl, eomi  RESPIRATORY: Breathing comfortably with normal RR.  CARDIAC: Regular rate and rhythm  ABDOMEN: Soft, nontender, +bowel sounds, no rebound, rigidity, or guarding.  MSK: Range of motion is not limited, no deformities noted.  NEURO: Alert and oriented x 3. Cn 2-12 intact. Strength 5/5 and sensation intact in all 4 extremities. no pronator drift. FTN normal. Neg Rhomberg. pt ataxis, falling to the right slightly on ambulation.  SKIN: Skin normal color for race, warm, dry and intact. No evidence of rash.  PSYCH: Alert and oriented to person, place, time/situation. normal mood and affect. no apparent risk to self or others.

## 2018-05-02 NOTE — ED PROVIDER NOTE - OBJECTIVE STATEMENT
83 yo F w/ PMHx of Afib, HLD, breast cancer (s/p lumpectomy), and anal cancer (s/p radiation/chemo) and PVD/varicose veins 83 yo F w/ PMHx of Afib, HLD, breast cancer (s/p lumpectomy), and anal cancer (s/p radiation/chemo) and PVD/varicose veins, multiple myeloma who p/w lower extremity weakness and feeling off balance since 2pm yesterday. No fall, no f/c, no cp/sob, no n/t/w in extremities, no headache, no neck pain.

## 2018-05-02 NOTE — DISCHARGE NOTE ADULT - MEDICATION SUMMARY - MEDICATIONS TO TAKE
I will START or STAY ON the medications listed below when I get home from the hospital:    aspirin 81 mg oral tablet, chewable  -- 1 tab(s) by mouth once a day  -- Indication: For tia    amiodarone 200 mg oral tablet  -- every other day  -- Indication: For Atrial fibrillation    Eliquis 2.5 mg oral tablet  -- 1 tab(s) by mouth 2 times a day  -- Indication: For Atrial fibrillation    diazePAM 5 mg oral tablet  -- 5 milligram(s) by mouth once a day  -- Indication: For Anxiety    Lipitor 40 mg oral tablet  -- 1 tab(s) by mouth once a day MDD:1  -- Avoid grapefruit and grapefruit juice while taking this medication.  Do not take this drug if you are pregnant.  It is very important that you take or use this exactly as directed.  Do not skip doses or discontinue unless directed by your doctor.  Obtain medical advice before taking any non-prescription drugs as some may affect the action of this medication.  Take with food or milk.    -- Indication: For tia    letrozole 2.5 mg oral tablet  -- 1 tab(s) by mouth once a day  -- Indication: For history of breast cancer    clopidogrel 75 mg oral tablet  -- 1 tab(s) by mouth once a day  -- Indication: For tia    metoprolol succinate 25 mg oral tablet, extended release  -- 1 tab(s) by mouth once a day  -- Indication: For Atrial fibrillation    torsemide 20 mg oral tablet  -- orally 2 times a day  -- Indication: For hypertension    levothyroxine 25 mcg (0.025 mg) oral tablet  -- 1 tab(s) by mouth once a day  -- Indication: For hypothyroidism

## 2018-05-02 NOTE — H&P ADULT - NSHPSOCIALHISTORY_GEN_ALL_CORE
Alcohol:  Recreational drugs:  Smoking:  Employment:  Living situation:      < from: CT Angio Neck w/ IV Cont (05.02.18 @ 11:48) >    IMPRESSION:     Unremarkable CTA examination of the brain. Alcohol: glass of wine with dinner, no history of shakes, does not drink every day  Recreational drugs: denies history  Smoking: smoked socially for 30 years. Quit in 1989.      < from: CT Angio Neck w/ IV Cont (05.02.18 @ 11:48) >    IMPRESSION:     Unremarkable CTA examination of the brain.

## 2018-05-02 NOTE — DISCHARGE NOTE ADULT - NS AS DC STROKE ED MATERIALS
Stroke Warning Signs and Symptoms/Risk Factors for Stroke/Prescribed Medications/Need for Followup After Discharge/Stroke Education Booklet/Call 911 for Stroke

## 2018-05-02 NOTE — H&P ADULT - PROBLEM SELECTOR PLAN 1
CT head brain stroke protocol and CTA head/neck without signs of acute hemorrhage or infarct. Pt with normal gait on my exam. Also normotensive on admission. NIHSS 0. Last known normal ~20 hours ago.  - No TPA administered  - Per Dr. Curiel, concern for ataxic stroke despite no acute findings on imaging thus far, further workup warranted.  - Aspirin, Plavix, Statin  - TTE  - fluids  - f/u MRI brain

## 2018-05-02 NOTE — ED ADULT NURSE NOTE - OBJECTIVE STATEMENT
Pt states she started to have weakness to her legs and an unsteady gait yesterday around 2pm. Pt states she continued to walk throughout the day but felt like her legs were weak.

## 2018-05-03 VITALS — TEMPERATURE: 99 F

## 2018-05-03 LAB
ALBUMIN SERPL ELPH-MCNC: 3.4 G/DL — SIGNIFICANT CHANGE UP (ref 3.3–5)
ALP SERPL-CCNC: 70 U/L — SIGNIFICANT CHANGE UP (ref 40–120)
ALT FLD-CCNC: 13 U/L — SIGNIFICANT CHANGE UP (ref 10–45)
ANION GAP SERPL CALC-SCNC: 10 MMOL/L — SIGNIFICANT CHANGE UP (ref 5–17)
AST SERPL-CCNC: 17 U/L — SIGNIFICANT CHANGE UP (ref 10–40)
BILIRUB SERPL-MCNC: 0.6 MG/DL — SIGNIFICANT CHANGE UP (ref 0.2–1.2)
BUN SERPL-MCNC: 24 MG/DL — HIGH (ref 7–23)
CALCIUM SERPL-MCNC: 8.7 MG/DL — SIGNIFICANT CHANGE UP (ref 8.4–10.5)
CHLORIDE SERPL-SCNC: 104 MMOL/L — SIGNIFICANT CHANGE UP (ref 96–108)
CHOLEST SERPL-MCNC: 161 MG/DL — SIGNIFICANT CHANGE UP (ref 10–199)
CO2 SERPL-SCNC: 29 MMOL/L — SIGNIFICANT CHANGE UP (ref 22–31)
CREAT SERPL-MCNC: 1.52 MG/DL — HIGH (ref 0.5–1.3)
GLUCOSE SERPL-MCNC: 84 MG/DL — SIGNIFICANT CHANGE UP (ref 70–99)
HCT VFR BLD CALC: 34.1 % — LOW (ref 34.5–45)
HDLC SERPL-MCNC: 67 MG/DL — SIGNIFICANT CHANGE UP (ref 40–125)
HGB BLD-MCNC: 11 G/DL — LOW (ref 11.5–15.5)
LIPID PNL WITH DIRECT LDL SERPL: 83 MG/DL — SIGNIFICANT CHANGE UP
MAGNESIUM SERPL-MCNC: 2.3 MG/DL — SIGNIFICANT CHANGE UP (ref 1.6–2.6)
MCHC RBC-ENTMCNC: 29.2 PG — SIGNIFICANT CHANGE UP (ref 27–34)
MCHC RBC-ENTMCNC: 32.3 G/DL — SIGNIFICANT CHANGE UP (ref 32–36)
MCV RBC AUTO: 90.5 FL — SIGNIFICANT CHANGE UP (ref 80–100)
PLATELET # BLD AUTO: 235 K/UL — SIGNIFICANT CHANGE UP (ref 150–400)
POTASSIUM SERPL-MCNC: 3.9 MMOL/L — SIGNIFICANT CHANGE UP (ref 3.5–5.3)
POTASSIUM SERPL-SCNC: 3.9 MMOL/L — SIGNIFICANT CHANGE UP (ref 3.5–5.3)
PROT SERPL-MCNC: 6.3 G/DL — SIGNIFICANT CHANGE UP (ref 6–8.3)
RBC # BLD: 3.77 M/UL — LOW (ref 3.8–5.2)
RBC # FLD: 15.1 % — SIGNIFICANT CHANGE UP (ref 10.3–16.9)
SODIUM SERPL-SCNC: 143 MMOL/L — SIGNIFICANT CHANGE UP (ref 135–145)
TOTAL CHOLESTEROL/HDL RATIO MEASUREMENT: 2.4 RATIO — LOW (ref 3.3–7.1)
TRIGL SERPL-MCNC: 57 MG/DL — SIGNIFICANT CHANGE UP (ref 10–149)
TSH SERPL-MCNC: 5.93 UIU/ML — HIGH (ref 0.35–4.94)
WBC # BLD: 6 K/UL — SIGNIFICANT CHANGE UP (ref 3.8–10.5)
WBC # FLD AUTO: 6 K/UL — SIGNIFICANT CHANGE UP (ref 3.8–10.5)

## 2018-05-03 PROCEDURE — 99238 HOSP IP/OBS DSCHRG MGMT 30/<: CPT

## 2018-05-03 RX ORDER — ATORVASTATIN CALCIUM 80 MG/1
1 TABLET, FILM COATED ORAL
Qty: 0 | Refills: 0 | COMMUNITY

## 2018-05-03 RX ORDER — POTASSIUM CHLORIDE 20 MEQ
10 PACKET (EA) ORAL ONCE
Qty: 0 | Refills: 0 | Status: COMPLETED | OUTPATIENT
Start: 2018-05-03 | End: 2018-05-03

## 2018-05-03 RX ORDER — DIAZEPAM 5 MG
0 TABLET ORAL
Qty: 0 | Refills: 0 | COMMUNITY

## 2018-05-03 RX ORDER — ATORVASTATIN CALCIUM 80 MG/1
1 TABLET, FILM COATED ORAL
Qty: 30 | Refills: 0
Start: 2018-05-03 | End: 2018-06-01

## 2018-05-03 RX ORDER — ATORVASTATIN CALCIUM 80 MG/1
1 TABLET, FILM COATED ORAL
Qty: 30 | Refills: 0 | OUTPATIENT
Start: 2018-05-03 | End: 2018-06-01

## 2018-05-03 RX ORDER — ASPIRIN/CALCIUM CARB/MAGNESIUM 324 MG
1 TABLET ORAL
Qty: 0 | Refills: 0 | DISCHARGE
Start: 2018-05-03

## 2018-05-03 RX ORDER — ATORVASTATIN CALCIUM 80 MG/1
1 TABLET, FILM COATED ORAL
Qty: 0 | Refills: 0 | DISCHARGE
Start: 2018-05-03 | End: 2018-06-01

## 2018-05-03 RX ORDER — CLOPIDOGREL BISULFATE 75 MG/1
1 TABLET, FILM COATED ORAL
Qty: 0 | Refills: 0 | DISCHARGE
Start: 2018-05-03

## 2018-05-03 RX ADMIN — CLOPIDOGREL BISULFATE 75 MILLIGRAM(S): 75 TABLET, FILM COATED ORAL at 09:26

## 2018-05-03 RX ADMIN — APIXABAN 2.5 MILLIGRAM(S): 2.5 TABLET, FILM COATED ORAL at 09:26

## 2018-05-03 RX ADMIN — Medication 25 MICROGRAM(S): at 06:22

## 2018-05-03 RX ADMIN — Medication 10 MILLIEQUIVALENT(S): at 09:26

## 2018-05-03 RX ADMIN — Medication 81 MILLIGRAM(S): at 09:26

## 2018-05-03 RX ADMIN — AMIODARONE HYDROCHLORIDE 200 MILLIGRAM(S): 400 TABLET ORAL at 09:26

## 2018-05-03 NOTE — OCCUPATIONAL THERAPY INITIAL EVALUATION ADULT - PERTINENT HX OF CURRENT PROBLEM, REHAB EVAL
84 y/o F presents with gait instability. Patient states that 5/1/18 2pm she was walking around at the [place] and felt unsteady on her feet. She states that she felt as if she needed to hold onto something. She denies dizziness or room-spinning. Stroke code called due to gait instability per attending exam.

## 2018-05-03 NOTE — PHYSICAL THERAPY INITIAL EVALUATION ADULT - MANUAL MUSCLE TESTING RESULTS, REHAB EVAL
5/5 for each of the following MMT assessments: shoulder flexion, elbow flexion, elbow extension, , hip flexion, knee extension (left LE limited by slight pain, chronic), ankle DF, ankle PF.

## 2018-05-03 NOTE — PHYSICAL THERAPY INITIAL EVALUATION ADULT - ADDITIONAL COMMENTS
Patient reports that she lives in an elevator building alone. Reports being fully independent prior to admission. *Head of hearing.

## 2018-05-07 DIAGNOSIS — G45.9 TRANSIENT CEREBRAL ISCHEMIC ATTACK, UNSPECIFIED: ICD-10-CM

## 2018-05-07 DIAGNOSIS — Z92.21 PERSONAL HISTORY OF ANTINEOPLASTIC CHEMOTHERAPY: ICD-10-CM

## 2018-05-07 DIAGNOSIS — Z85.3 PERSONAL HISTORY OF MALIGNANT NEOPLASM OF BREAST: ICD-10-CM

## 2018-05-07 DIAGNOSIS — Z85.048 PERSONAL HISTORY OF OTHER MALIGNANT NEOPLASM OF RECTUM, RECTOSIGMOID JUNCTION, AND ANUS: ICD-10-CM

## 2018-05-07 DIAGNOSIS — I83.90 ASYMPTOMATIC VARICOSE VEINS OF UNSPECIFIED LOWER EXTREMITY: ICD-10-CM

## 2018-05-07 DIAGNOSIS — Z87.891 PERSONAL HISTORY OF NICOTINE DEPENDENCE: ICD-10-CM

## 2018-05-07 DIAGNOSIS — I48.91 UNSPECIFIED ATRIAL FIBRILLATION: ICD-10-CM

## 2018-05-07 DIAGNOSIS — I73.9 PERIPHERAL VASCULAR DISEASE, UNSPECIFIED: ICD-10-CM

## 2018-05-07 DIAGNOSIS — R27.0 ATAXIA, UNSPECIFIED: ICD-10-CM

## 2018-05-07 DIAGNOSIS — Z92.3 PERSONAL HISTORY OF IRRADIATION: ICD-10-CM

## 2018-05-07 DIAGNOSIS — R53.1 WEAKNESS: ICD-10-CM

## 2018-05-07 DIAGNOSIS — C90.00 MULTIPLE MYELOMA NOT HAVING ACHIEVED REMISSION: ICD-10-CM

## 2018-05-07 DIAGNOSIS — Z79.01 LONG TERM (CURRENT) USE OF ANTICOAGULANTS: ICD-10-CM

## 2018-05-07 DIAGNOSIS — E78.5 HYPERLIPIDEMIA, UNSPECIFIED: ICD-10-CM

## 2018-05-07 DIAGNOSIS — E03.9 HYPOTHYROIDISM, UNSPECIFIED: ICD-10-CM

## 2018-05-08 PROBLEM — C90.00 MULTIPLE MYELOMA NOT HAVING ACHIEVED REMISSION: Chronic | Status: ACTIVE | Noted: 2018-05-02

## 2018-05-16 ENCOUNTER — APPOINTMENT (OUTPATIENT)
Dept: NEUROLOGY | Facility: CLINIC | Age: 83
End: 2018-05-16
Payer: MEDICARE

## 2018-05-16 VITALS
WEIGHT: 112 LBS | OXYGEN SATURATION: 96 % | BODY MASS INDEX: 20.61 KG/M2 | SYSTOLIC BLOOD PRESSURE: 118 MMHG | TEMPERATURE: 97.7 F | HEIGHT: 62 IN | HEART RATE: 55 BPM | DIASTOLIC BLOOD PRESSURE: 70 MMHG

## 2018-05-16 PROCEDURE — 99212 OFFICE O/P EST SF 10 MIN: CPT

## 2018-05-23 PROCEDURE — 70498 CT ANGIOGRAPHY NECK: CPT

## 2018-05-23 PROCEDURE — 82550 ASSAY OF CK (CPK): CPT

## 2018-05-23 PROCEDURE — 82553 CREATINE MB FRACTION: CPT

## 2018-05-23 PROCEDURE — 80053 COMPREHEN METABOLIC PANEL: CPT

## 2018-05-23 PROCEDURE — 93005 ELECTROCARDIOGRAM TRACING: CPT

## 2018-05-23 PROCEDURE — 97162 PT EVAL MOD COMPLEX 30 MIN: CPT

## 2018-05-23 PROCEDURE — 82962 GLUCOSE BLOOD TEST: CPT

## 2018-05-23 PROCEDURE — 85027 COMPLETE CBC AUTOMATED: CPT

## 2018-05-23 PROCEDURE — 85025 COMPLETE CBC W/AUTO DIFF WBC: CPT

## 2018-05-23 PROCEDURE — 84443 ASSAY THYROID STIM HORMONE: CPT

## 2018-05-23 PROCEDURE — 85610 PROTHROMBIN TIME: CPT

## 2018-05-23 PROCEDURE — 81003 URINALYSIS AUTO W/O SCOPE: CPT

## 2018-05-23 PROCEDURE — 83735 ASSAY OF MAGNESIUM: CPT

## 2018-05-23 PROCEDURE — 70496 CT ANGIOGRAPHY HEAD: CPT

## 2018-05-23 PROCEDURE — 36415 COLL VENOUS BLD VENIPUNCTURE: CPT

## 2018-05-23 PROCEDURE — 70450 CT HEAD/BRAIN W/O DYE: CPT

## 2018-05-23 PROCEDURE — 99285 EMERGENCY DEPT VISIT HI MDM: CPT | Mod: 25

## 2018-05-23 PROCEDURE — 80061 LIPID PANEL: CPT

## 2018-05-23 PROCEDURE — 84484 ASSAY OF TROPONIN QUANT: CPT

## 2018-05-23 PROCEDURE — 83036 HEMOGLOBIN GLYCOSYLATED A1C: CPT

## 2018-05-23 PROCEDURE — 71045 X-RAY EXAM CHEST 1 VIEW: CPT

## 2018-05-23 PROCEDURE — 85730 THROMBOPLASTIN TIME PARTIAL: CPT

## 2018-08-21 ENCOUNTER — APPOINTMENT (OUTPATIENT)
Dept: NEUROLOGY | Facility: CLINIC | Age: 83
End: 2018-08-21
Payer: MEDICARE

## 2018-08-21 VITALS
OXYGEN SATURATION: 97 % | SYSTOLIC BLOOD PRESSURE: 125 MMHG | HEIGHT: 62 IN | WEIGHT: 114 LBS | DIASTOLIC BLOOD PRESSURE: 74 MMHG | HEART RATE: 54 BPM | BODY MASS INDEX: 20.98 KG/M2

## 2018-08-21 PROCEDURE — 99214 OFFICE O/P EST MOD 30 MIN: CPT

## 2019-02-22 ENCOUNTER — APPOINTMENT (OUTPATIENT)
Dept: NEUROLOGY | Facility: CLINIC | Age: 84
End: 2019-02-22
Payer: MEDICARE

## 2019-02-22 VITALS — BODY MASS INDEX: 21.35 KG/M2 | HEIGHT: 62 IN | WEIGHT: 116 LBS

## 2019-02-22 VITALS — OXYGEN SATURATION: 95 % | HEART RATE: 76 BPM

## 2019-02-22 VITALS — SYSTOLIC BLOOD PRESSURE: 145 MMHG | DIASTOLIC BLOOD PRESSURE: 77 MMHG

## 2019-02-22 VITALS — DIASTOLIC BLOOD PRESSURE: 76 MMHG | SYSTOLIC BLOOD PRESSURE: 150 MMHG

## 2019-02-22 PROCEDURE — 99214 OFFICE O/P EST MOD 30 MIN: CPT

## 2019-02-22 RX ORDER — AMIODARONE HYDROCHLORIDE 200 MG/1
200 TABLET ORAL DAILY
Qty: 15 | Refills: 0 | Status: DISCONTINUED | COMMUNITY
End: 2019-02-22

## 2019-02-22 NOTE — DISCUSSION/SUMMARY
[FreeTextEntry1] : Neurologically stable\par \par BP /77\par \par Recommended PT for balance advised to use cane when walking log distance to minimize risk for fall freddy. with AC\par \par Obtain labs from oncologist Dr. Giron\par \par RTC 6 months or earlier as symptoms dictate

## 2019-02-22 NOTE — HISTORY OF PRESENT ILLNESS
[FreeTextEntry1] : Patient presents for follow up of TIA 5/2018 with symptoms of dizziness. \par \par Reports feeling well. Denies dizziness or lightheadedness. States balance s not great. She denies falls and states balance is stable from 1 year ago.\par \par She is very active- does chi-gong aqua exercises and works out at the gym regularly. \par \par PMH significant for afib (on eliquis), HLD, breast and anal cancers, PVD, varicose veins and active multiple myeloma

## 2019-02-22 NOTE — PHYSICAL EXAM
[FreeTextEntry1] : Constitutional: \par -Alert, in no acute distress and well nourished\par Psychiatric: \par -Oriented to person, place, and time\par -Insight and judgment intact\par -Normal affect \par Neurologic: \par -Memory: short term, remote and recent memory intact \par -Language: fluency intact, no dysarthria \par Cranial Nerves: \par -Visual acuity intact bilaterally\par -Visual fields full to confrontation\par -Pupils equal round and reactive to light\par -Extraocular motion intact\par -Facial sensation intact symmetrically\par -Face symmetrical\par -Hearing grossly intact bilaterally\par -Tongue and palate midline\par -Had turning and shoulder shrug symmetric \par Motor: \par -Muscle tone normal in all four extremities\par -Muscle strength normal in all four extremities\par -No pronator drift on the right. no pronator drift on the left\par Sensory exam\par -Light touch intact\par -Pain and temperature intact \par -Vibration intact \par Coordination:. \par -Normal gait\par -Balance intact. \par -Romberg's sign negative\par -No past-pointing\par -No tremor present\par - No dysmetria on finger to nose or heel to shin.\par Deep tendon reflexes: \par -1-2 throughout\par Eyes: \par -Sclera and conjunctiva normal\par -Pupils equal in size, round, reactive to light, with normal accommodation\par -Extraocular movements intact \par -Full visual field \par Musculoskeletal: \par -Normal gait \par -Muscle strength and tone normal. VEERS TO LEFT WITH EYES CLOSED\par Skin:\par -Normal skin color and pigmentation- ERYTHEMA TO BILATERAL HANDS\par -Normal skin turgor \par -No skin lesions\par Respiratory:\par -No respiratory distress

## 2019-08-23 ENCOUNTER — APPOINTMENT (OUTPATIENT)
Dept: NEUROLOGY | Facility: CLINIC | Age: 84
End: 2019-08-23

## 2020-08-24 ENCOUNTER — EMERGENCY (EMERGENCY)
Facility: HOSPITAL | Age: 85
LOS: 1 days | Discharge: HOME | End: 2020-08-24
Attending: EMERGENCY MEDICINE | Admitting: EMERGENCY MEDICINE
Payer: MEDICARE

## 2020-08-24 VITALS
HEART RATE: 84 BPM | DIASTOLIC BLOOD PRESSURE: 75 MMHG | HEIGHT: 61 IN | WEIGHT: 123.02 LBS | TEMPERATURE: 98 F | SYSTOLIC BLOOD PRESSURE: 156 MMHG | OXYGEN SATURATION: 96 % | RESPIRATION RATE: 18 BRPM

## 2020-08-24 VITALS
OXYGEN SATURATION: 97 % | RESPIRATION RATE: 18 BRPM | DIASTOLIC BLOOD PRESSURE: 72 MMHG | SYSTOLIC BLOOD PRESSURE: 147 MMHG | TEMPERATURE: 98 F | HEART RATE: 63 BPM

## 2020-08-24 DIAGNOSIS — S00.03XA CONTUSION OF SCALP, INITIAL ENCOUNTER: ICD-10-CM

## 2020-08-24 DIAGNOSIS — S09.90XA UNSPECIFIED INJURY OF HEAD, INITIAL ENCOUNTER: ICD-10-CM
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
## 2020-08-24 PROCEDURE — 72125 CT NECK SPINE W/O DYE: CPT | Mod: 26

## 2020-08-24 PROCEDURE — 70450 CT HEAD/BRAIN W/O DYE: CPT

## 2020-08-24 PROCEDURE — 70450 CT HEAD/BRAIN W/O DYE: CPT | Mod: 26

## 2020-08-24 PROCEDURE — 99284 EMERGENCY DEPT VISIT MOD MDM: CPT

## 2020-08-24 PROCEDURE — 72125 CT NECK SPINE W/O DYE: CPT

## 2020-08-24 PROCEDURE — 99284 EMERGENCY DEPT VISIT MOD MDM: CPT | Mod: 25

## 2020-08-24 NOTE — ED PROVIDER NOTE - CLINICAL SUMMARY MEDICAL DECISION MAKING FREE TEXT BOX
89 y/o F with PMHx multiple myeloma, on eliquis, presents to the ED complaining of head injury after dizziness resulting in a fall last night. Will check CT head and c-spine to r/o bleed or fx.

## 2020-08-24 NOTE — ED ADULT NURSE NOTE - OBJECTIVE STATEMENT
Pt is an 87 y/o female A&Ox4 in NAD c/o head injury s/p mechanical fall yesterday. Pt states "I had half a glass of wine and my knees gave out and I fell and hit the back of my head." Pt denies LOC, visual changes, N/V, h/a, numbness/tingling, chest pain, SOB. + Eliquis. Pt talking in clear, full sentences, respirations even and unlabored. Upon assessment ecchymosis and lump noted to right posterior head, skin intact. Neuro intact.

## 2020-08-24 NOTE — ED ADULT NURSE NOTE - NSIMPLEMENTINTERV_GEN_ALL_ED
Implemented All Fall with Harm Risk Interventions:  [place] to call system. Call bell, personal items and telephone within reach. Instruct patient to call for assistance. Room bathroom lighting operational. Non-slip footwear when patient is off stretcher. Physically safe environment: no spills, clutter or unnecessary equipment. Stretcher in lowest position, wheels locked, appropriate side rails in place. Provide visual cue, wrist band, yellow gown, etc. Monitor gait and stability. Monitor for mental status changes and reorient to person, place, and time. Review medications for side effects contributing to fall risk. Reinforce activity limits and safety measures with patient and family. Provide visual clues: red socks.

## 2020-08-24 NOTE — ED PROVIDER NOTE - NSFOLLOWUPINSTRUCTIONS_ED_ALL_ED_FT
Head Injury, Adult  There are many types of head injuries. Head injuries can be as minor as a bump, or they can be more severe. More severe head injuries include:    A jarring injury to the brain (concussion).  A bruise of the brain (contusion). This means there is bleeding in the brain that can cause swelling.  A cracked skull (skull fracture).  Bleeding in the brain that collects, clots, and forms a bump (hematoma).    After a head injury, you may need to be observed for a while in the emergency department or urgent care. Sometimes admission to the hospital is needed.    After a head injury has happened, most problems occur within the first 24 hours, but side effects may occur up to 7–10 days after the injury. It is important to watch your condition for any changes.    What are the causes?  There are many possible causes of a head injury. A serious head injury may happen to someone who is in a car accident (motor vehicle collision). Other causes of major head injuries include bicycle or motorcycle accidents, sports injuries, and falls.    Risk factors  This condition is more likely to occur in people who:    Drink a lot of alcohol or use drugs.  Are over the age of 65.  Are at risk for falls.    What are the symptoms?  There are many possible symptoms of a head injury. Visible symptoms of a head injury include a bruise, bump, or bleeding at the site of the injury. Other non-visible symptoms include:    Feeling sleepy or not being able to stay awake.  Passing out.  Headache.  Seizures.  Dizziness.  Confusion.  Memory problems.  Nausea or vomiting.    Other possible symptoms that may develop after the head injury include:    Poor attention and concentration.  Fatigue or tiring easily.  Irritability.  Being uncomfortable around bright lights or loud noises.  Anxiety or depression.  Disturbed sleep.    How is this diagnosed?  This condition can usually be diagnosed based on your symptoms, a description of the injury, and a physical exam. You may also have imaging tests done, such as a CT scan or MRI. You will also be closely watched.    How is this treated?  Treatment for this condition depends on the severity and type of injury you have. The main goal of treatment is to prevent complications and allow the brain time to heal.    For mild head injury, you may be sent home and treatment may include:    Observation. A responsible adult should stay with you for 24 hours after your injury and check on you often.  Physical rest.  Brain rest.  Pain medicines.    For severe brain injury, treatment may include:    Close observation. This includes hospitalization with frequent physical exams. You may need to go to a hospital that specializes in head injury.  Pain medicines.  Breathing support. This may include using a ventilator.  Managing the pressure inside the brain (intracranial pressure, or ICP). This may include:    Monitoring the ICP.  Giving medicines to decrease the ICP.  Positioning you to decrease the ICP.    Medicine to prevent seizures.  Surgery to stop bleeding or to remove blood clots (craniotomy).  Surgery to remove part of the skull (decompressive craniectomy). This allows room for the brain to swell.    Follow these instructions at home:  Activity     Rest as much as possible and avoid activities that are physically hard or tiring.  Make sure you get enough sleep.  Limit activities that require a lot of thought or attention, such as:    Watching TV.  Playing memory games and puzzles.  Job-related work or homework.  Working on the computer, social media, and texting.    Avoid activities that could cause another head injury, such as playing sports, until your health care provider approves. Having another head injury, especially before the first one has healed, can be dangerous.  Ask your health care provider when it is safe for you to return to your regular activities, including work or school. Ask your health care provider for a step-by-step plan for gradually returning to activities.  Ask your health care provider when you can drive, ride a bicycle, or use heavy machinery. Your ability to react may be slower after a brain injury. Never do these activities if you are dizzy.  Lifestyle     Do not drink alcohol until your health care provider approves, and avoid drug use. Alcohol and certain drugs may slow your recovery and can put you at risk of further injury.  If it is harder than usual to remember things, write them down.  If you are easily distracted, try to do one thing at a time.  Talk with family members or close friends when making important decisions.  Tell your friends, family, a trusted colleague, and  about your injury, symptoms, and restrictions. Have them watch for any new or worsening problems.    General instructions     Take over-the-counter and prescription medicines only as told by your health care provider.  Have someone stay with you for 24 hours after your head injury. This person should watch you for any changes in your symptoms and be ready to seek medical help, as needed.  Keep all follow-up visits as told by your health care provider. This is important.    How is this prevented?  Work on improving your balance and strength to avoid falls.  Wear a seatbelt when you are in a moving vehicle.  Wear a helmet when riding a bicycle, skiing, or doing any other sport or activity that has a risk of injury.  Drink alcohol only in moderation.    Take safety measures in your home, such as:  Removing clutter and tripping hazards from floors and stairways.  Using grab bars in bathrooms and handrails by stairs.  Placing non-slip mats on floors and in bathtubs.  Improving lighting in dim areas.    Get help right away if you have:  A severe headache that is not helped by medicine.  Trouble walking, have weakness in your arms and legs, or lose your balance.  Clear or bloody fluid coming from your nose or ears.  Changes in your vision.  A seizure.    You vomit.  Your symptoms get worse.  Your speech is slurred.  You pass out.  You are sleepier and have trouble staying awake.  Your pupils change size.    These symptoms may represent a serious problem that is an emergency. Do not wait to see if the symptoms will go away. Get medical help right away. Call your local emergency services (911 in the [place] Do not drive yourself to the hospital.

## 2020-08-24 NOTE — ED PROVIDER NOTE - PHYSICAL EXAMINATION
CONSTITUTIONAL: Well-appearing; well-nourished; in no apparent distress.   HEAD: 3cm circular hematoma present on the posterior right occiput.  EYES:  conjunctiva and sclera clear  ENT: normal nose; no rhinorrhea; normal pharynx with no erythema or lesions.   NECK: Supple; non-tender;   CARDIOVASCULAR: Normal S1, S2; no murmurs, rubs, or gallops. Regular rate and rhythm.   RESPIRATORY: Breathing easily; breath sounds clear and equal bilaterally; no wheezes, rhonchi, or rales.  GI: Soft; non-distended; non-tender; no palpable organomegaly.   EXT: No cyanosis or edema; N/V intact  SKIN: Ecchymosis to b/l fore  NEURO: A & O x 3; face symmetric; grossly unremarkable.   PSYCHOLOGICAL: The patient’s mood and manner are appropriate. CONSTITUTIONAL: Well-appearing; well-nourished; in no apparent distress.   HEAD: 3cm circular hematoma present on the posterior right occiput.  EYES:  conjunctiva and sclera clear  ENT: normal nose; no rhinorrhea; normal pharynx with no erythema or lesions.   NECK: Supple; non-tender;   CARDIOVASCULAR: Normal S1, S2; no murmurs, rubs, or gallops. Regular rate and rhythm.   RESPIRATORY: Breathing easily; breath sounds clear and equal bilaterally; no wheezes, rhonchi, or rales.  GI: Soft; non-distended; non-tender; no palpable organomegaly.   EXT: Ecchymosis present on b/l forearms from frequent blood draws. No cyanosis or edema; N/V intact.  SKIN: Ecchymosis to b/l fore  NEURO: A & O x 3; face symmetric; grossly unremarkable.   PSYCHOLOGICAL: The patient’s mood and manner are appropriate.

## 2020-08-24 NOTE — ED PROVIDER NOTE - PATIENT PORTAL LINK FT
You can access the FollowMyHealth Patient Portal offered by [facility] by registering at the following website: [email or web address]. By joining [email or web address]’s FollowMyHealth portal, you will also be able to view your health information using other applications (apps) compatible with our system.

## 2020-08-24 NOTE — ED PROVIDER NOTE - OBJECTIVE STATEMENT
87 y/o F with PMHx multiple myeloma, on eliquis, presents to the ED c/o head injury after a fall last night. Pt states she lives in an apartment alone, and felt dizzy last night around 9pm. Pt sat down for a bit to alleviate the dizziness, but eventually did fall down and hit the back of her head. Denies LOC as she remembers everything that happened, and was able to ambulate immediately afterwards. Denies any bleeding anywhere else on the body. Currently c/o pain to the back of the head, as well as some very mild neck pain and left buttock pain that is mostly resolved. Denies any fever, chills, chest pain, or SOB.

## 2020-08-24 NOTE — ED ADULT TRIAGE NOTE - OTHER COMPLAINTS
patient presents s/p syncope episode yesterday +head strike,reports taking eliquis-- denies dizziness/CP at present

## 2020-12-18 ENCOUNTER — EMERGENCY (EMERGENCY)
Facility: HOSPITAL | Age: 85
LOS: 1 days | Discharge: HOME | End: 2020-12-18
Attending: EMERGENCY MEDICINE | Admitting: EMERGENCY MEDICINE
Payer: MEDICARE

## 2020-12-18 VITALS
DIASTOLIC BLOOD PRESSURE: 74 MMHG | SYSTOLIC BLOOD PRESSURE: 113 MMHG | RESPIRATION RATE: 18 BRPM | WEIGHT: 100.09 LBS | TEMPERATURE: 97 F | HEIGHT: 61 IN | OXYGEN SATURATION: 99 % | HEART RATE: 66 BPM

## 2020-12-18 VITALS
OXYGEN SATURATION: 99 % | HEART RATE: 80 BPM | SYSTOLIC BLOOD PRESSURE: 138 MMHG | RESPIRATION RATE: 18 BRPM | DIASTOLIC BLOOD PRESSURE: 88 MMHG | TEMPERATURE: 98 F

## 2020-12-18 DIAGNOSIS — E86.0 DEHYDRATION: ICD-10-CM

## 2020-12-18 DIAGNOSIS — R42 DIZZINESS AND GIDDINESS: ICD-10-CM

## 2020-12-18 DIAGNOSIS — Z20.828 CONTACT WITH AND (SUSPECTED) EXPOSURE TO OTHER VIRAL COMMUNICABLE DISEASES: ICD-10-CM

## 2020-12-18 LAB
ALBUMIN SERPL ELPH-MCNC: 3.6 G/DL — SIGNIFICANT CHANGE UP (ref 3.3–5)
ALP SERPL-CCNC: 80 U/L — SIGNIFICANT CHANGE UP (ref 40–120)
ALT FLD-CCNC: 14 U/L — SIGNIFICANT CHANGE UP (ref 10–45)
ANION GAP SERPL CALC-SCNC: 16 MMOL/L — SIGNIFICANT CHANGE UP (ref 5–17)
AST SERPL-CCNC: 24 U/L — SIGNIFICANT CHANGE UP (ref 10–40)
BASOPHILS # BLD AUTO: 0.02 K/UL — SIGNIFICANT CHANGE UP (ref 0–0.2)
BASOPHILS NFR BLD AUTO: 0.2 % — SIGNIFICANT CHANGE UP (ref 0–2)
BILIRUB SERPL-MCNC: 0.3 MG/DL — SIGNIFICANT CHANGE UP (ref 0.2–1.2)
BUN SERPL-MCNC: 21 MG/DL — SIGNIFICANT CHANGE UP (ref 7–23)
CALCIUM SERPL-MCNC: 8.6 MG/DL — SIGNIFICANT CHANGE UP (ref 8.4–10.5)
CHLORIDE SERPL-SCNC: 102 MMOL/L — SIGNIFICANT CHANGE UP (ref 96–108)
CO2 SERPL-SCNC: 21 MMOL/L — LOW (ref 22–31)
CREAT SERPL-MCNC: 1.22 MG/DL — SIGNIFICANT CHANGE UP (ref 0.5–1.3)
EOSINOPHIL # BLD AUTO: 0 K/UL — SIGNIFICANT CHANGE UP (ref 0–0.5)
EOSINOPHIL NFR BLD AUTO: 0 % — SIGNIFICANT CHANGE UP (ref 0–6)
GLUCOSE SERPL-MCNC: 87 MG/DL — SIGNIFICANT CHANGE UP (ref 70–99)
HCT VFR BLD CALC: 38.9 % — SIGNIFICANT CHANGE UP (ref 34.5–45)
HGB BLD-MCNC: 12.6 G/DL — SIGNIFICANT CHANGE UP (ref 11.5–15.5)
IMM GRANULOCYTES NFR BLD AUTO: 0.3 % — SIGNIFICANT CHANGE UP (ref 0–1.5)
LACTATE SERPL-SCNC: 2 MMOL/L — SIGNIFICANT CHANGE UP (ref 0.5–2)
LYMPHOCYTES # BLD AUTO: 0.88 K/UL — LOW (ref 1–3.3)
LYMPHOCYTES # BLD AUTO: 9.8 % — LOW (ref 13–44)
MCHC RBC-ENTMCNC: 29.3 PG — SIGNIFICANT CHANGE UP (ref 27–34)
MCHC RBC-ENTMCNC: 32.4 GM/DL — SIGNIFICANT CHANGE UP (ref 32–36)
MCV RBC AUTO: 90.5 FL — SIGNIFICANT CHANGE UP (ref 80–100)
MONOCYTES # BLD AUTO: 0.73 K/UL — SIGNIFICANT CHANGE UP (ref 0–0.9)
MONOCYTES NFR BLD AUTO: 8.1 % — SIGNIFICANT CHANGE UP (ref 2–14)
NEUTROPHILS # BLD AUTO: 7.32 K/UL — SIGNIFICANT CHANGE UP (ref 1.8–7.4)
NEUTROPHILS NFR BLD AUTO: 81.6 % — HIGH (ref 43–77)
NRBC # BLD: 0 /100 WBCS — SIGNIFICANT CHANGE UP (ref 0–0)
PLATELET # BLD AUTO: 155 K/UL — SIGNIFICANT CHANGE UP (ref 150–400)
POTASSIUM SERPL-MCNC: 4.6 MMOL/L — SIGNIFICANT CHANGE UP (ref 3.5–5.3)
POTASSIUM SERPL-SCNC: 4.6 MMOL/L — SIGNIFICANT CHANGE UP (ref 3.5–5.3)
PROT SERPL-MCNC: 7.3 G/DL — SIGNIFICANT CHANGE UP (ref 6–8.3)
RBC # BLD: 4.3 M/UL — SIGNIFICANT CHANGE UP (ref 3.8–5.2)
RBC # FLD: 14.2 % — SIGNIFICANT CHANGE UP (ref 10.3–14.5)
SARS-COV-2 RNA SPEC QL NAA+PROBE: SIGNIFICANT CHANGE UP
SODIUM SERPL-SCNC: 139 MMOL/L — SIGNIFICANT CHANGE UP (ref 135–145)
WBC # BLD: 8.98 K/UL — SIGNIFICANT CHANGE UP (ref 3.8–10.5)
WBC # FLD AUTO: 8.98 K/UL — SIGNIFICANT CHANGE UP (ref 3.8–10.5)

## 2020-12-18 PROCEDURE — 99283 EMERGENCY DEPT VISIT LOW MDM: CPT

## 2020-12-18 PROCEDURE — 83605 ASSAY OF LACTIC ACID: CPT

## 2020-12-18 PROCEDURE — 87635 SARS-COV-2 COVID-19 AMP PRB: CPT

## 2020-12-18 PROCEDURE — 80053 COMPREHEN METABOLIC PANEL: CPT

## 2020-12-18 PROCEDURE — 36415 COLL VENOUS BLD VENIPUNCTURE: CPT

## 2020-12-18 PROCEDURE — 85025 COMPLETE CBC W/AUTO DIFF WBC: CPT

## 2020-12-18 PROCEDURE — 99284 EMERGENCY DEPT VISIT MOD MDM: CPT | Mod: CS

## 2020-12-18 RX ORDER — SODIUM CHLORIDE 9 MG/ML
1000 INJECTION INTRAMUSCULAR; INTRAVENOUS; SUBCUTANEOUS ONCE
Refills: 0 | Status: COMPLETED | OUTPATIENT
Start: 2020-12-18 | End: 2020-12-18

## 2020-12-18 RX ADMIN — SODIUM CHLORIDE 1000 MILLILITER(S): 9 INJECTION INTRAMUSCULAR; INTRAVENOUS; SUBCUTANEOUS at 16:46

## 2020-12-18 NOTE — ED PROVIDER NOTE - CLINICAL SUMMARY MEDICAL DECISION MAKING FREE TEXT BOX
88F PMH multiple myeloma, afib on eliquis, HLD, breast ca, anal ca, PVD/varicose veins, presumed TIA (Dizziness 2018) p/w diarrhea/lightheaded. 3d ago pt had velcade injection, also fish and salad for dinner. Shortly after dinner developed nausea and diarrhea. Multiple episodes of diarrhea 2d ago and yesterday. Took immodium x4 yesterday and diarrhea resolved. However, since yesterday she feels lightheaded/dizzy only w/ position change. Today her  brought her up food and she almost fell bec of lightheaded so called EMS. Currently asymptomatic in stretcher. Vitals wnl, exam as above. Well appearing.  ddx: Likely dehydration 2/2 diarrhea (likely gastro).   Labs, IVF, observe in ED/reassess.

## 2020-12-18 NOTE — ED ADULT NURSE NOTE - CHPI ED NUR SYMPTOMS NEG
no abdominal distension/no blood in stool/no burning urination/no chills/no dysuria/no fever/no hematuria/no nausea/no vomiting

## 2020-12-18 NOTE — ED PROVIDER NOTE - OBJECTIVE STATEMENT
88F PMH multiple myeloma, afib on eliquis, HLD, breast ca, anal ca, PVD/varicose veins, presumed TIA (Dizziness 2018) p/w diarrhea/lightheaded. 3d ago pt had velcade injection, also fish and salad for dinner. Shortly after dinner developed nausea and diarrhea. Multiple episodes of diarrhea 2d ago and yesterday. Took immodium x4 yesterday and diarrhea resolved. However, since yesterday she feels lightheaded/dizzy only w/ position change. Today her  brought her up food and she almost fell bec of lightheaded so called EMS. Currently asymptomatic in [facility]. Denies abd pain, f/c, vomiting, black/bloody stool, urinary complaints, focal weakness/numbness, back pain, rashes, recent travel, recent antibiotics, sick contacts, SOB/CP, URI symptoms.   Onc: [place]

## 2020-12-18 NOTE — ED ADULT NURSE NOTE - PAIN RATING/NUMBER SCALE (0-10): REST
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    Target INR is 2.0 - 3.0.    Anticipated duration of therapy is through 11/9. Last dose expected on 11/8 as VTEp post TKA.     Patient continued on prior warfarin therapy.  Dose prior to pharmacist inpatient anticoagulation management service consultation was 4mg.    Most Recent Lab Values:  INR (no units)   Date/Time Value   11/04/2019 1.9 (A)     HGB (g/dL)   Date/Time Value   11/05/2019 0134 12.5     HCT (%)   Date/Time Value   11/05/2019 0134 40.1     PLT (K/mcL)   Date/Time Value   11/05/2019 0134 293     AST/SGOT (Units/L)   Date/Time Value   11/05/2019 0134 22     ALT/SGPT (Units/L)   Date/Time Value   11/05/2019 0134 27     Creatinine clearance cannot be calculated (Unknown ideal weight.)  OB/Gyn status: Hysterectomy      Medical History:  Weight: Weight: 122.7 kg (11/05/19 0618)  Age: 68 year old  Patient does not have factors that may warrant deviation from the standard protocol (protocol exception).     No pertinent medical history noted.    The patient's medication and allergy profile was reviewed for possible drug-allergy, drug-drug, and drug-herbal interactions.    Assessment & Plan:  For anticoagulation therapy, will continue current warfarin dose of 4 mg once.     Pharmacist inpatient anticoagulation management will review/monitor patient daily and order warfarin dose/regimen based on protocol.    Thank you,    Teresa Dickey RPH  11/5/2019 7:37 AM  
0

## 2020-12-18 NOTE — ED PROVIDER NOTE - PATIENT PORTAL LINK FT
You can access the FollowMyHealth Patient Portal offered by [facility] by registering at the following website: [email or web address]. By joining [employer]’s FollowMyHealth portal, you will also be able to view your health information using other applications (apps) compatible with our system.

## 2020-12-18 NOTE — ED ADULT NURSE REASSESSMENT NOTE - NS ED NURSE REASSESS COMMENT FT1
pt ambulated with steady gait denies dizziness lightheadedness. Admits to some generalized weakness. MD Hernandez made aware, came to speak to pt

## 2020-12-18 NOTE — ED ADULT NURSE NOTE - OBJECTIVE STATEMENT
Pt presents reporting diarrhea since monday resolving wednesday with immodium. Pt reports she now has dizziness upon standing. Pt reports she is able to maintain some PO intake but feels she quickly eliminates what she drinks.

## 2020-12-18 NOTE — ED PROVIDER NOTE - PROGRESS NOTE DETAILS
[place]: Labs grossly wnl. Pt feeling much better. Steady unassisted gait around ED w/o any lightheaded/dizziness. Clinically no indication for further emergent ED workup or hospitalization at this time. Comfortable for dc, outpt f/u.

## 2020-12-18 NOTE — ED ADULT TRIAGE NOTE - CHIEF COMPLAINT QUOTE
87 y/o female BIBEMS for evaluation of diarrhea and dizziness. Pt reports having diarrhea for the past 3 days. Hx of afib.

## 2020-12-18 NOTE — ED PROVIDER NOTE - NSFOLLOWUPINSTRUCTIONS_ED_ALL_ED_FT
Your COVID results are pending, can call ER at [phone] to get results. Can take a few hours to a few days to result. If you are "positive," you will get a phone call.     It is unclear what exactly is causing your symptoms but was likely related to your diarrhea and mild dehydration. It is important to continue following up with your doctor outside the hospital and to return to ER for: Persistent fever/vomiting, uncontrolled pain, worsening swelling, worsening breathing, worsening lightheaded, spreading redness.     Stay well hydrated.  Follow up with primary doctor within 1-2 days.     Diarrhea    Diarrhea is frequent loose or watery bowel movements that has many causes. Diarrhea can make you feel weak and cause you to become dehydrated. Diarrhea typically lasts 2–3 days, but can last longer if it is a sign of something more serious. Drink clear fluids to prevent dehydration. Eat bland, easy-to-digest foods as tolerated.     SEEK IMMEDIATE MEDICAL CARE IF YOU HAVE ANY OF THE FOLLOWING SYMPTOMS: high fevers, lightheadedness/dizziness, chest pain, black or bloody stools, shortness of breath, severe abdominal or back pain, or any signs of dehydration.

## 2020-12-18 NOTE — ED ADULT NURSE NOTE - NSIMPLEMENTINTERV_GEN_ALL_ED
Implemented All Universal Safety Interventions:  [place] to call system. Call bell, personal items and telephone within reach. Instruct patient to call for assistance. Room bathroom lighting operational. Non-slip footwear when patient is off stretcher. Physically safe environment: no spills, clutter or unnecessary equipment. Stretcher in lowest position, wheels locked, appropriate side rails in place.

## 2020-12-19 ENCOUNTER — EMERGENCY (EMERGENCY)
Facility: HOSPITAL | Age: 85
LOS: 1 days | Discharge: HOME | End: 2020-12-19
Attending: EMERGENCY MEDICINE | Admitting: EMERGENCY MEDICINE
Payer: MEDICARE

## 2020-12-19 VITALS
SYSTOLIC BLOOD PRESSURE: 145 MMHG | RESPIRATION RATE: 20 BRPM | WEIGHT: 130.07 LBS | HEIGHT: 61 IN | DIASTOLIC BLOOD PRESSURE: 77 MMHG | HEART RATE: 59 BPM | OXYGEN SATURATION: 99 % | TEMPERATURE: 98 F

## 2020-12-19 DIAGNOSIS — E86.0 DEHYDRATION: ICD-10-CM

## 2020-12-19 DIAGNOSIS — R55 SYNCOPE AND COLLAPSE: ICD-10-CM

## 2020-12-19 DIAGNOSIS — Z87.19 PERSONAL HISTORY OF OTHER DISEASES OF THE DIGESTIVE SYSTEM: ICD-10-CM

## 2020-12-19 DIAGNOSIS — R42 DIZZINESS AND GIDDINESS: ICD-10-CM

## 2020-12-19 PROCEDURE — 80053 COMPREHEN METABOLIC PANEL: CPT

## 2020-12-19 PROCEDURE — 87186 SC STD MICRODIL/AGAR DIL: CPT

## 2020-12-19 PROCEDURE — 82962 GLUCOSE BLOOD TEST: CPT

## 2020-12-19 PROCEDURE — 36415 COLL VENOUS BLD VENIPUNCTURE: CPT

## 2020-12-19 PROCEDURE — 81001 URINALYSIS AUTO W/SCOPE: CPT

## 2020-12-19 PROCEDURE — 84484 ASSAY OF TROPONIN QUANT: CPT

## 2020-12-19 PROCEDURE — 93005 ELECTROCARDIOGRAM TRACING: CPT

## 2020-12-19 PROCEDURE — 99284 EMERGENCY DEPT VISIT MOD MDM: CPT

## 2020-12-19 PROCEDURE — 87086 URINE CULTURE/COLONY COUNT: CPT

## 2020-12-19 PROCEDURE — 99285 EMERGENCY DEPT VISIT HI MDM: CPT

## 2020-12-19 PROCEDURE — 85730 THROMBOPLASTIN TIME PARTIAL: CPT

## 2020-12-19 PROCEDURE — 93010 ELECTROCARDIOGRAM REPORT: CPT

## 2020-12-19 PROCEDURE — 85025 COMPLETE CBC W/AUTO DIFF WBC: CPT

## 2020-12-19 PROCEDURE — 85610 PROTHROMBIN TIME: CPT

## 2020-12-19 RX ORDER — SODIUM CHLORIDE 9 MG/ML
1000 INJECTION INTRAMUSCULAR; INTRAVENOUS; SUBCUTANEOUS ONCE
Refills: 0 | Status: COMPLETED | OUTPATIENT
Start: 2020-12-19 | End: 2020-12-19

## 2020-12-19 RX ADMIN — SODIUM CHLORIDE 1000 MILLILITER(S): 9 INJECTION INTRAMUSCULAR; INTRAVENOUS; SUBCUTANEOUS at 23:45

## 2020-12-19 NOTE — ED PROVIDER NOTE - OBJECTIVE STATEMENT
87 y/o f with h/o TIA, breast/rectal ca, HLD, multiple myeloma., a-fib on Eliquis, PVD, present to today c/o feeling very light headed and dizzy. Patient report of being seen yesterday for same thing due to recently having copious amount of diarrhea on Wednesday into Thursday. Patient report while washing dishes she became dizzy and fell onto her knees while still holding on to the sink. Patient lose consciousness. Denies fever, URI, sob, chest pain, n, v, abd  pain, bloody stools. Last diarrhea was two days ago. Admit of drinking fluids but probably not enough as per patient. Also admit to occasional vertigo.

## 2020-12-19 NOTE — ED PROVIDER NOTE - NSFOLLOWUPINSTRUCTIONS_ED_ALL_ED_FT
DEHYDRATION - AfterCare(R) Instructions(ER/ED)           Dehydration    WHAT YOU NEED TO KNOW:    Dehydration is a condition that develops when your body does not have enough fluid. You may become dehydrated if you do not drink enough water or lose too much fluid. Fluid loss may also cause loss of electrolytes (minerals), such as sodium.    DISCHARGE INSTRUCTIONS:    Return to the emergency department if:   •You have a seizure.      •You are confused or cannot think clearly.      •You are extremely sleepy, or another person cannot wake you.       •You become dizzy or faint when you stand.      •You are not able to urinate.      •You have trouble breathing.      •You have a fast or irregular heartbeat.      •Your hands or feet are cold, or your face is pale.       Contact your healthcare provider if:   •You have trouble drinking liquids because you are vomiting.      •Your symptoms get worse.      •You have a fever.       •You feel very weak or tired.      •You have questions or concerns about your condition or care.      Follow up with your healthcare provider as directed: Write down your questions so you remember to ask them during your visits.     Prevent or manage dehydration:   •Drink liquids as directed. Liquids that contain water, sugar, and minerals can help your body hold in fluid and help prevent dehydration. Drink liquids throughout the day, not just when you feel thirsty. Men should drink about 3 liters (13 eight-ounce cups) of liquid each day. Women should drink about 2 liters (9 eight-ounce cups) of liquid each day. Drink even more liquid if you will be outdoors, in the sun for a long time, or exercising.       •Stay cool. Limit the time you spend outdoors during the hottest part of the day. Dress in lightweight clothes.       •Keep track of how often you urinate. If you urinate less than usual or your urine is darker, drink more liquids.         © Copyright [employer] 2020           back to top                          © Copyright [employer] 2020

## 2020-12-19 NOTE — ED ADULT TRIAGE NOTE - OTHER COMPLAINTS
CC of syncope x occasions tonight but denies falling. No facial droop, bilateral arm and leg strength 5/5

## 2020-12-19 NOTE — ED PROVIDER NOTE - CLINICAL SUMMARY MEDICAL DECISION MAKING FREE TEXT BOX
Patient feeling better , walking unassisted and steady. States she want to go home. Pt was offered SW and PT assessment in the morning however pt decline and feel comfortable going home.

## 2020-12-19 NOTE — ED PROVIDER NOTE - PATIENT PORTAL LINK FT
You can access the FollowMyHealth Patient Portal offered by [facility] by registering at the following website: [email or web address]. By joining [employer]’s FollowMyHealth portal, you will also be able to view your health information using other applications (apps) compatible with our system.

## 2020-12-19 NOTE — ED PROVIDER NOTE - ENMT, MLM
Airway patent, Nasal mucosa clear. Mouth with normal mucosa. Throat has no vesicles, no oropharyngeal exudates and uvula is midline. + dry oral mucosa

## 2020-12-19 NOTE — ED PROVIDER NOTE - ENMT NEGATIVE STATEMENT, MLM
Ears: no ear pain Nose: no nasal congestion and no nasal drainage. Mouth/Throat: no dysphagia, no hoarseness and no throat pain. Neck: no lumps, no pain, no stiffness and no swollen glands.

## 2020-12-19 NOTE — ED PROVIDER NOTE - ATTENDING CONTRIBUTION TO CARE
88 year old female w hx of atrial fibrillation (eliquis), TIA, breast and rectal CA, MM, PVD presents to ED with concern for abdominal discomfort and diarrhea for several days following prepared fish at home.  She was seen in ED feeling lightheaded yesterday, was ultimately discharged following treatment for dehydration.  Today her symptoms persist and she had a near syncope episode today while doing dishes where she helped herself to the ground without fall or hit to head, LOC.  On exam, patient is non toxic in appearance.  HRRR, lungs clear.  Abd soft, NT/ND.  No focal neuro deficits.  EKG rate controlled atrial fibrillation, non ischemic.  MMD.  Will check CXR, labs, hydrate and dispo accordingly.

## 2020-12-20 VITALS
HEART RATE: 99 BPM | DIASTOLIC BLOOD PRESSURE: 70 MMHG | RESPIRATION RATE: 18 BRPM | OXYGEN SATURATION: 97 % | TEMPERATURE: 98 F | SYSTOLIC BLOOD PRESSURE: 116 MMHG

## 2020-12-20 LAB
APPEARANCE UR: CLEAR — SIGNIFICANT CHANGE UP
BACTERIA # UR AUTO: PRESENT /HPF
BILIRUB UR-MCNC: NEGATIVE — SIGNIFICANT CHANGE UP
COLOR SPEC: YELLOW — SIGNIFICANT CHANGE UP
DIFF PNL FLD: ABNORMAL
EPI CELLS # UR: ABNORMAL /HPF (ref 0–5)
GLUCOSE UR QL: NEGATIVE — SIGNIFICANT CHANGE UP
GRAN CASTS # UR COMP ASSIST: ABNORMAL /LPF
HYALINE CASTS # UR AUTO: ABNORMAL /LPF (ref 0–2)
KETONES UR-MCNC: ABNORMAL MG/DL
LEUKOCYTE ESTERASE UR-ACNC: NEGATIVE — SIGNIFICANT CHANGE UP
NITRITE UR-MCNC: NEGATIVE — SIGNIFICANT CHANGE UP
PH UR: 6 — SIGNIFICANT CHANGE UP (ref 5–8)
PROT UR-MCNC: NEGATIVE MG/DL — SIGNIFICANT CHANGE UP
RBC CASTS # UR COMP ASSIST: < 5 /HPF — SIGNIFICANT CHANGE UP
SP GR SPEC: 1.02 — SIGNIFICANT CHANGE UP (ref 1–1.03)
UROBILINOGEN FLD QL: 0.2 E.U./DL — SIGNIFICANT CHANGE UP
WBC UR QL: < 5 /HPF — SIGNIFICANT CHANGE UP

## 2021-04-22 ENCOUNTER — LABORATORY RESULT (OUTPATIENT)
Age: 86
End: 2021-04-22

## 2021-04-22 ENCOUNTER — APPOINTMENT (OUTPATIENT)
Dept: HEART AND VASCULAR | Facility: CLINIC | Age: 86
End: 2021-04-22
Payer: MEDICARE

## 2021-04-22 VITALS
WEIGHT: 124 LBS | DIASTOLIC BLOOD PRESSURE: 60 MMHG | TEMPERATURE: 97.6 F | BODY MASS INDEX: 22.82 KG/M2 | SYSTOLIC BLOOD PRESSURE: 142 MMHG | HEIGHT: 62 IN | HEART RATE: 60 BPM

## 2021-04-22 DIAGNOSIS — Z85.048 PERSONAL HISTORY OF OTHER MALIGNANT NEOPLASM OF RECTUM, RECTOSIGMOID JUNCTION, AND ANUS: ICD-10-CM

## 2021-04-22 DIAGNOSIS — Z86.39 PERSONAL HISTORY OF OTHER ENDOCRINE, NUTRITIONAL AND METABOLIC DISEASE: ICD-10-CM

## 2021-04-22 DIAGNOSIS — Z87.898 PERSONAL HISTORY OF OTHER SPECIFIED CONDITIONS: ICD-10-CM

## 2021-04-22 DIAGNOSIS — Z86.79 PERSONAL HISTORY OF OTHER DISEASES OF THE CIRCULATORY SYSTEM: ICD-10-CM

## 2021-04-22 DIAGNOSIS — Z85.3 PERSONAL HISTORY OF MALIGNANT NEOPLASM OF BREAST: ICD-10-CM

## 2021-04-22 DIAGNOSIS — J39.9 DISEASE OF UPPER RESPIRATORY TRACT, UNSPECIFIED: ICD-10-CM

## 2021-04-22 DIAGNOSIS — Z92.29 PERSONAL HISTORY OF OTHER DRUG THERAPY: ICD-10-CM

## 2021-04-22 DIAGNOSIS — D49.2 NEOPLASM OF UNSPECIFIED BEHAVIOR OF BONE, SOFT TISSUE, AND SKIN: ICD-10-CM

## 2021-04-22 DIAGNOSIS — S06.9X9D UNSPECIFIED INTRACRANIAL INJURY WITH LOSS OF CONSCIOUSNESS OF UNSPECIFIED DURATION, SUBSEQUENT ENCOUNTER: ICD-10-CM

## 2021-04-22 PROCEDURE — 93306 TTE W/DOPPLER COMPLETE: CPT

## 2021-04-22 PROCEDURE — 99215 OFFICE O/P EST HI 40 MIN: CPT | Mod: 25

## 2021-04-22 PROCEDURE — 36415 COLL VENOUS BLD VENIPUNCTURE: CPT

## 2021-04-22 PROCEDURE — 93000 ELECTROCARDIOGRAM COMPLETE: CPT

## 2021-04-22 RX ORDER — FUROSEMIDE 40 MG/1
40 TABLET ORAL DAILY
Qty: 30 | Refills: 0 | Status: DISCONTINUED | COMMUNITY
End: 2021-04-22

## 2021-04-22 RX ORDER — AMIODARONE HYDROCHLORIDE 200 MG/1
200 TABLET ORAL
Refills: 0 | Status: DISCONTINUED | COMMUNITY
End: 2021-04-22

## 2021-04-22 NOTE — PHYSICAL EXAM
[General Appearance - Well Developed] : well developed [Normal Appearance] : normal appearance [Well Groomed] : well groomed [General Appearance - Well Nourished] : well nourished [No Deformities] : no deformities [General Appearance - In No Acute Distress] : no acute distress [Normal Conjunctiva] : the conjunctiva exhibited no abnormalities [Eyelids - No Xanthelasma] : the eyelids demonstrated no xanthelasmas [Normal Oral Mucosa] : normal oral mucosa [No Oral Pallor] : no oral pallor [No Oral Cyanosis] : no oral cyanosis [Normal Jugular Venous A Waves Present] : normal jugular venous A waves present [Normal Jugular Venous V Waves Present] : normal jugular venous V waves present [No Jugular Venous Vee A Waves] : no jugular venous vee A waves [Respiration, Rhythm And Depth] : normal respiratory rhythm and effort [Exaggerated Use Of Accessory Muscles For Inspiration] : no accessory muscle use [Auscultation Breath Sounds / Voice Sounds] : lungs were clear to auscultation bilaterally [Systolic grade ___/6] : A grade [unfilled]/6 systolic murmur was heard. [Abdomen Soft] : soft [Abdomen Tenderness] : non-tender [] : no hepato-splenomegaly [Abdomen Mass (___ Cm)] : no abdominal mass palpated [Abnormal Walk] : normal gait [Gait - Sufficient For Exercise Testing] : the gait was sufficient for exercise testing

## 2021-04-22 NOTE — HISTORY OF PRESENT ILLNESS
[FreeTextEntry1] : Mrs. Aguilar is an active 88-year-old woman who was diagnosed with multiple myeloma in 2018 Dr. Giron follows her and she is in remission she had anal cancer and breast cancer she has severe mitral regurgitation and paroxysmal atrial fibrillation she is complaining of increasing shortness of breath and fatigue

## 2021-04-22 NOTE — REVIEW OF SYSTEMS
[Shortness Of Breath] : shortness of breath [Dyspnea on exertion] : dyspnea during exertion [Lower Ext Edema] : lower extremity edema [Palpitations] : palpitations [Negative] : Genitourinary

## 2021-04-22 NOTE — ASSESSMENT
[FreeTextEntry1] : Patient with severe mitral regurgitation shortness of breath walking history of paroxysmal fibrillation multiple myeloma in remission who I am going to refer to Dr. Mayen for the for a mitral valve clip if it is appropriate

## 2021-04-30 LAB
ALBUMIN SERPL ELPH-MCNC: 4.1 G/DL
ALP BLD-CCNC: 78 U/L
ALT SERPL-CCNC: 11 U/L
ANION GAP SERPL CALC-SCNC: 14 MMOL/L
AST SERPL-CCNC: 23 U/L
BASOPHILS # BLD AUTO: 0.06 K/UL
BASOPHILS NFR BLD AUTO: 0.7 %
BILIRUB SERPL-MCNC: 0.4 MG/DL
BUN SERPL-MCNC: 25 MG/DL
CALCIUM SERPL-MCNC: 9.9 MG/DL
CHLORIDE SERPL-SCNC: 106 MMOL/L
CHOLEST SERPL-MCNC: 167 MG/DL
CK SERPL-CCNC: 67 U/L
CO2 SERPL-SCNC: 23 MMOL/L
CREAT SERPL-MCNC: 1.6 MG/DL
EOSINOPHIL # BLD AUTO: 0.07 K/UL
EOSINOPHIL NFR BLD AUTO: 0.8 %
ESTIMATED AVERAGE GLUCOSE: 114 MG/DL
FERRITIN SERPL-MCNC: 36 NG/ML
GLUCOSE SERPL-MCNC: 89 MG/DL
HBA1C MFR BLD HPLC: 5.6 %
HCT VFR BLD CALC: 38.5 %
HDLC SERPL-MCNC: 72 MG/DL
HGB BLD-MCNC: 11.9 G/DL
IMM GRANULOCYTES NFR BLD AUTO: 0.3 %
LDLC SERPL CALC-MCNC: 84 MG/DL
LYMPHOCYTES # BLD AUTO: 1.17 K/UL
LYMPHOCYTES NFR BLD AUTO: 13 %
MAN DIFF?: NORMAL
MCHC RBC-ENTMCNC: 29.2 PG
MCHC RBC-ENTMCNC: 30.9 GM/DL
MCV RBC AUTO: 94.6 FL
MONOCYTES # BLD AUTO: 0.81 K/UL
MONOCYTES NFR BLD AUTO: 9 %
NEUTROPHILS # BLD AUTO: 6.84 K/UL
NEUTROPHILS NFR BLD AUTO: 76.2 %
NONHDLC SERPL-MCNC: 95 MG/DL
PLATELET # BLD AUTO: 297 K/UL
POTASSIUM SERPL-SCNC: 4.8 MMOL/L
PROT SERPL-MCNC: 6.8 G/DL
RBC # BLD: 4.07 M/UL
RBC # FLD: 15.7 %
SODIUM SERPL-SCNC: 142 MMOL/L
TRIGL SERPL-MCNC: 58 MG/DL
TSH SERPL-ACNC: 1.7 UIU/ML
VIT B12 SERPL-MCNC: 265 PG/ML
WBC # FLD AUTO: 8.98 K/UL

## 2021-05-10 ENCOUNTER — APPOINTMENT (OUTPATIENT)
Dept: CARDIOTHORACIC SURGERY | Facility: CLINIC | Age: 86
End: 2021-05-10
Payer: MEDICARE

## 2021-05-10 VITALS
HEIGHT: 62 IN | TEMPERATURE: 97 F | BODY MASS INDEX: 22.82 KG/M2 | DIASTOLIC BLOOD PRESSURE: 67 MMHG | RESPIRATION RATE: 17 BRPM | SYSTOLIC BLOOD PRESSURE: 146 MMHG | HEART RATE: 58 BPM | WEIGHT: 124 LBS | OXYGEN SATURATION: 96 %

## 2021-05-10 PROCEDURE — 99214 OFFICE O/P EST MOD 30 MIN: CPT

## 2021-05-11 NOTE — REVIEW OF SYSTEMS
[Feeling Tired] : feeling tired [As Noted in HPI] : as noted in HPI [Shortness Of Breath] : shortness of breath [Negative] : Heme/Lymph

## 2021-05-11 NOTE — HISTORY OF PRESENT ILLNESS
[FreeTextEntry1] : 88 year old female, former smoker (quit in 1989), with a history of HTN, hyperlipidemia, multiple myeloma 2018, breast cancer s/p right lumpectomy, rectal cancer s/p radiation treatment, atrial fibrillation s/p DCCV in May 2021 (on Eliquis), chronic diastolic heart failure with mitral regurgitation who has been referred for further evaluation of valvular heart disease. \par \par \par Since her last visit in 2017, the patient states she has been experiencing worsening dyspnea after walking less than two blocks. She is also very fatigued. She denies any SOB at rest, chest pain, palpitations, dizziness, syncope, or LE edema. \par \par ECHO 4/22/21: LVEF 60%. Mitral annular calcification. Thickened mitral valve. Severe mitral regurgitation. \par \par The patient lives alone and remains independent in all her ADLs. \par \par Heme/Onc- Dr. Paddy Giron

## 2021-05-11 NOTE — PHYSICAL EXAM
[PERRL With Normal Accommodation] : pupils were equal in size, round, and reactive to light [] : no respiratory distress [Respiration, Rhythm And Depth] : normal respiratory rhythm and effort [Auscultation Breath Sounds / Voice Sounds] : lungs were clear to auscultation bilaterally [Heart Rate And Rhythm] : heart rate was normal and rhythm regular [Bowel Sounds] : normal bowel sounds [Abdomen Soft] : soft [Abdomen Tenderness] : non-tender [No CVA Tenderness] : no ~M costovertebral angle tenderness [Abnormal Walk] : normal gait [Cranial Nerves] : cranial nerves 2-12 were intact [Oriented To Time, Place, And Person] : oriented to person, place, and time [FreeTextEntry1] : + systolic ejection murmur best heard at apex

## 2021-05-14 NOTE — DISCHARGE NOTE ADULT - SECONDARY STROKE PREVENTION
Progress Note      Patient Name: Radha Aparicio   Patient ID: [id number]   Sex: Female   YOB: 1928    Primary Care Provider: Gala Rao MD   Referring Provider: Gala Rao MD    Visit Date: March 17, 2021    Provider: Dariana Grubbs MD   Location: [facility]   Location Address: [address] [place] [address]  [id number]   Location Phone: [phone]          History Of Present Illness  The patient presents for follow-up of superficial bladder cancer.   The patient was last seen six months ago . The patient is scheduled for repeat cysto and u/a . Cystoscopy on the last visit showed normal findings. She had some areas with inflammation but I biopsies them and they were normal. Her retrogrades were normal.   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   TELEHEALTH TELEPHONE VISIT  Radha Aparicio is a 89 year old /White female who is presenting for evaluation via telehealth telephone visit. Verbal consent obtained before beginning visit.   Provider spent 5 minutes with the patient during the telehealth visit.   The following staff were present during this visit: Kezia Cummins               Assessment  · Cancer of overlapping sites of bladder     188.8/C67.8  · History of bladder cancer     V10.51/Z85.51      Plan  · Orders  o Cystoscopy (19146) - 188.8/C67.8, V10.51/Z85.51 - 03/17/2021  · Instructions  o Make a 6 month follow-up for an in cystoscopy in the office for continued screening.   o TIME OUT PROCEDURE: Correct patient and birth date; Correct procedure; Correct Physician; Consent signed  o Plan Of Care:             Electronically Signed by: Dariana Grubbs MD -Author on March 17, 2021 05:17:27 PM  
Statement Selected

## 2021-05-16 ENCOUNTER — LABORATORY RESULT (OUTPATIENT)
Age: 86
End: 2021-05-16

## 2021-05-18 ENCOUNTER — FORM ENCOUNTER (OUTPATIENT)
Age: 86
End: 2021-05-18

## 2021-05-19 ENCOUNTER — OUTPATIENT (OUTPATIENT)
Dept: OUTPATIENT SERVICES | Facility: HOSPITAL | Age: 86
LOS: 1 days | End: 2021-05-19
Payer: MEDICARE

## 2021-05-19 ENCOUNTER — APPOINTMENT (OUTPATIENT)
Dept: CARDIOTHORACIC SURGERY | Facility: CLINIC | Age: 86
End: 2021-05-19
Payer: MEDICARE

## 2021-05-19 DIAGNOSIS — I34.0 NONRHEUMATIC MITRAL (VALVE) INSUFFICIENCY: ICD-10-CM

## 2021-05-19 PROCEDURE — 93312 ECHO TRANSESOPHAGEAL: CPT

## 2021-05-19 PROCEDURE — 99214 OFFICE O/P EST MOD 30 MIN: CPT

## 2021-05-19 PROCEDURE — 76377 3D RENDER W/INTRP POSTPROCES: CPT | Mod: 26

## 2021-05-19 PROCEDURE — 93312 ECHO TRANSESOPHAGEAL: CPT | Mod: 26

## 2021-05-21 NOTE — HISTORY OF PRESENT ILLNESS
[FreeTextEntry1] : 88 year old female, former smoker (quit in 1989), with a history of HTN, hyperlipidemia, multiple myeloma 2018, breast cancer s/p right lumpectomy, rectal cancer s/p radiation treatment, atrial fibrillation s/p DCCV in May 2021 (on Eliquis), chronic diastolic heart failure with mitral regurgitation who has been referred for further evaluation of valvular heart disease. She presents today for a follow up visit with ROBIN. \par \par \par The patient continues to feel very fatigued and SOB. \par \par \par \par

## 2021-05-21 NOTE — PHYSICAL EXAM
[PERRL With Normal Accommodation] : pupils were equal in size, round, and reactive to light [] : no respiratory distress [Respiration, Rhythm And Depth] : normal respiratory rhythm and effort [Auscultation Breath Sounds / Voice Sounds] : lungs were clear to auscultation bilaterally [Heart Rate And Rhythm] : heart rate was normal and rhythm regular [Abdomen Soft] : soft [Bowel Sounds] : normal bowel sounds [Abdomen Tenderness] : non-tender [No CVA Tenderness] : no ~M costovertebral angle tenderness [Abnormal Walk] : normal gait [Cranial Nerves] : cranial nerves 2-12 were intact [Oriented To Time, Place, And Person] : oriented to person, place, and time [FreeTextEntry1] : + systolic ejection murmur best heard at apex

## 2021-06-01 ENCOUNTER — NON-APPOINTMENT (OUTPATIENT)
Age: 86
End: 2021-06-01

## 2021-06-07 ENCOUNTER — TRANSCRIPTION ENCOUNTER (OUTPATIENT)
Age: 86
End: 2021-06-07

## 2021-06-07 VITALS
RESPIRATION RATE: 18 BRPM | WEIGHT: 126.1 LBS | DIASTOLIC BLOOD PRESSURE: 60 MMHG | HEIGHT: 62 IN | OXYGEN SATURATION: 96 % | HEART RATE: 64 BPM | TEMPERATURE: 98 F | SYSTOLIC BLOOD PRESSURE: 159 MMHG

## 2021-06-07 RX ORDER — SODIUM CHLORIDE 9 MG/ML
3 INJECTION INTRAMUSCULAR; INTRAVENOUS; SUBCUTANEOUS EVERY 8 HOURS
Refills: 0 | Status: DISCONTINUED | OUTPATIENT
Start: 2021-06-08 | End: 2021-06-09

## 2021-06-07 RX ORDER — DIAZEPAM 5 MG
5 TABLET ORAL
Qty: 0 | Refills: 0 | DISCHARGE

## 2021-06-07 NOTE — PRE-OP CHECKLIST - 3.
blood sent to BB for type and screen and full set of labs sent to lab from [facility] area

## 2021-06-08 ENCOUNTER — APPOINTMENT (OUTPATIENT)
Dept: CARDIOTHORACIC SURGERY | Facility: HOSPITAL | Age: 86
End: 2021-06-08

## 2021-06-08 ENCOUNTER — INPATIENT (INPATIENT)
Facility: HOSPITAL | Age: 86
LOS: 0 days | Discharge: HOME | DRG: 267 | End: 2021-06-09
Attending: INTERNAL MEDICINE | Admitting: INTERNAL MEDICINE
Payer: MEDICARE

## 2021-06-08 LAB
A1C WITH ESTIMATED AVERAGE GLUCOSE RESULT: 6.1 % — HIGH (ref 4–5.6)
ALBUMIN SERPL ELPH-MCNC: 3.1 G/DL — LOW (ref 3.3–5)
ALBUMIN SERPL ELPH-MCNC: 4.3 G/DL — SIGNIFICANT CHANGE UP (ref 3.3–5)
ALP SERPL-CCNC: 81 U/L — SIGNIFICANT CHANGE UP (ref 40–120)
ALP SERPL-CCNC: SIGNIFICANT CHANGE UP (ref 40–120)
ALT FLD-CCNC: 13 U/L — SIGNIFICANT CHANGE UP (ref 10–45)
ALT FLD-CCNC: SIGNIFICANT CHANGE UP (ref 10–45)
ANION GAP SERPL CALC-SCNC: 10 MMOL/L — SIGNIFICANT CHANGE UP (ref 5–17)
ANION GAP SERPL CALC-SCNC: 12 MMOL/L — SIGNIFICANT CHANGE UP (ref 5–17)
APTT BLD: 31.4 SEC — SIGNIFICANT CHANGE UP (ref 27.5–35.5)
APTT BLD: 32.3 SEC — SIGNIFICANT CHANGE UP (ref 27.5–35.5)
AST SERPL-CCNC: 21 U/L — SIGNIFICANT CHANGE UP (ref 10–40)
AST SERPL-CCNC: SIGNIFICANT CHANGE UP (ref 10–40)
BASE EXCESS BLDA CALC-SCNC: -5.6 MMOL/L — LOW (ref -2–3)
BASOPHILS # BLD AUTO: 0.05 K/UL — SIGNIFICANT CHANGE UP (ref 0–0.2)
BASOPHILS NFR BLD AUTO: 0.6 % — SIGNIFICANT CHANGE UP (ref 0–2)
BILIRUB SERPL-MCNC: 0.4 MG/DL — SIGNIFICANT CHANGE UP (ref 0.2–1.2)
BILIRUB SERPL-MCNC: 0.6 MG/DL — SIGNIFICANT CHANGE UP (ref 0.2–1.2)
BLD GP AB SCN SERPL QL: NEGATIVE — SIGNIFICANT CHANGE UP
BUN SERPL-MCNC: 23 MG/DL — SIGNIFICANT CHANGE UP (ref 7–23)
BUN SERPL-MCNC: 25 MG/DL — HIGH (ref 7–23)
CALCIUM SERPL-MCNC: 7.9 MG/DL — LOW (ref 8.4–10.5)
CALCIUM SERPL-MCNC: 9.2 MG/DL — SIGNIFICANT CHANGE UP (ref 8.4–10.5)
CHLORIDE SERPL-SCNC: 106 MMOL/L — SIGNIFICANT CHANGE UP (ref 96–108)
CHLORIDE SERPL-SCNC: 110 MMOL/L — HIGH (ref 96–108)
CO2 BLDA-SCNC: 20 MMOL/L — SIGNIFICANT CHANGE UP (ref 19–24)
CO2 SERPL-SCNC: 21 MMOL/L — LOW (ref 22–31)
CO2 SERPL-SCNC: 27 MMOL/L — SIGNIFICANT CHANGE UP (ref 22–31)
COHGB MFR BLDA: 1.5 % — SIGNIFICANT CHANGE UP
CREAT SERPL-MCNC: 1.49 MG/DL — HIGH (ref 0.5–1.3)
CREAT SERPL-MCNC: 1.9 MG/DL — HIGH (ref 0.5–1.3)
EOSINOPHIL # BLD AUTO: 0.04 K/UL — SIGNIFICANT CHANGE UP (ref 0–0.5)
EOSINOPHIL NFR BLD AUTO: 0.5 % — SIGNIFICANT CHANGE UP (ref 0–6)
ESTIMATED AVERAGE GLUCOSE: 128 MG/DL — HIGH (ref 68–114)
GLUCOSE SERPL-MCNC: 97 MG/DL — SIGNIFICANT CHANGE UP (ref 70–99)
GLUCOSE SERPL-MCNC: 99 MG/DL — SIGNIFICANT CHANGE UP (ref 70–99)
HCO3 BLDA-SCNC: 19 MMOL/L — LOW (ref 21–28)
HCT VFR BLD CALC: 32.5 % — LOW (ref 34.5–45)
HCT VFR BLD CALC: 37.4 % — SIGNIFICANT CHANGE UP (ref 34.5–45)
HGB BLD-MCNC: 10.4 G/DL — LOW (ref 11.5–15.5)
HGB BLD-MCNC: 11.8 G/DL — SIGNIFICANT CHANGE UP (ref 11.5–15.5)
HGB BLDA-MCNC: 9.2 G/DL — LOW (ref 11.7–16.1)
IMM GRANULOCYTES NFR BLD AUTO: 0.6 % — SIGNIFICANT CHANGE UP (ref 0–1.5)
INR BLD: 1.12 — SIGNIFICANT CHANGE UP (ref 0.88–1.16)
INR BLD: 1.29 — HIGH (ref 0.88–1.16)
LYMPHOCYTES # BLD AUTO: 1.03 K/UL — SIGNIFICANT CHANGE UP (ref 1–3.3)
LYMPHOCYTES # BLD AUTO: 12.7 % — LOW (ref 13–44)
MAGNESIUM SERPL-MCNC: 2 MG/DL — SIGNIFICANT CHANGE UP (ref 1.6–2.6)
MCHC RBC-ENTMCNC: 29.2 PG — SIGNIFICANT CHANGE UP (ref 27–34)
MCHC RBC-ENTMCNC: 29.5 PG — SIGNIFICANT CHANGE UP (ref 27–34)
MCHC RBC-ENTMCNC: 31.6 GM/DL — LOW (ref 32–36)
MCHC RBC-ENTMCNC: 32 GM/DL — SIGNIFICANT CHANGE UP (ref 32–36)
MCV RBC AUTO: 92.1 FL — SIGNIFICANT CHANGE UP (ref 80–100)
MCV RBC AUTO: 92.6 FL — SIGNIFICANT CHANGE UP (ref 80–100)
METHGB MFR BLDA: 0.6 % — SIGNIFICANT CHANGE UP
MONOCYTES # BLD AUTO: 0.64 K/UL — SIGNIFICANT CHANGE UP (ref 0–0.9)
MONOCYTES NFR BLD AUTO: 7.9 % — SIGNIFICANT CHANGE UP (ref 2–14)
NEUTROPHILS # BLD AUTO: 6.31 K/UL — SIGNIFICANT CHANGE UP (ref 1.8–7.4)
NEUTROPHILS NFR BLD AUTO: 77.7 % — HIGH (ref 43–77)
NRBC # BLD: 0 /100 WBCS — SIGNIFICANT CHANGE UP (ref 0–0)
NRBC # BLD: 0 /100 WBCS — SIGNIFICANT CHANGE UP (ref 0–0)
NT-PROBNP SERPL-SCNC: 596 PG/ML — HIGH (ref 0–300)
OXYHGB MFR BLDA: 95.7 % — HIGH (ref 90–95)
PCO2 BLDA: 34 MMHG — SIGNIFICANT CHANGE UP (ref 32–35)
PH BLDA: 7.36 — SIGNIFICANT CHANGE UP (ref 7.35–7.45)
PHOSPHATE SERPL-MCNC: 3.4 MG/DL — SIGNIFICANT CHANGE UP (ref 2.5–4.5)
PLATELET # BLD AUTO: 177 K/UL — SIGNIFICANT CHANGE UP (ref 150–400)
PLATELET # BLD AUTO: 244 K/UL — SIGNIFICANT CHANGE UP (ref 150–400)
PO2 BLDA: 82 MMHG — LOW (ref 83–108)
POTASSIUM BLDA-SCNC: 3.5 MMOL/L — SIGNIFICANT CHANGE UP (ref 3.5–5.1)
POTASSIUM SERPL-MCNC: 4 MMOL/L — SIGNIFICANT CHANGE UP (ref 3.5–5.3)
POTASSIUM SERPL-MCNC: 4.2 MMOL/L — SIGNIFICANT CHANGE UP (ref 3.5–5.3)
POTASSIUM SERPL-MCNC: SIGNIFICANT CHANGE UP (ref 3.5–5.3)
POTASSIUM SERPL-SCNC: 4 MMOL/L — SIGNIFICANT CHANGE UP (ref 3.5–5.3)
POTASSIUM SERPL-SCNC: 4.2 MMOL/L — SIGNIFICANT CHANGE UP (ref 3.5–5.3)
POTASSIUM SERPL-SCNC: SIGNIFICANT CHANGE UP (ref 3.5–5.3)
PROT SERPL-MCNC: 6.2 G/DL — SIGNIFICANT CHANGE UP (ref 6–8.3)
PROT SERPL-MCNC: 7.9 G/DL — SIGNIFICANT CHANGE UP (ref 6–8.3)
PROTHROM AB SERPL-ACNC: 13.4 SEC — SIGNIFICANT CHANGE UP (ref 10.6–13.6)
PROTHROM AB SERPL-ACNC: 15.3 SEC — HIGH (ref 10.6–13.6)
RBC # BLD: 3.53 M/UL — LOW (ref 3.8–5.2)
RBC # BLD: 4.04 M/UL — SIGNIFICANT CHANGE UP (ref 3.8–5.2)
RBC # FLD: 14.9 % — HIGH (ref 10.3–14.5)
RBC # FLD: 15 % — HIGH (ref 10.3–14.5)
RH IG SCN BLD-IMP: POSITIVE — SIGNIFICANT CHANGE UP
SAO2 % BLDA: 97.8 % — SIGNIFICANT CHANGE UP (ref 94–98)
SODIUM SERPL-SCNC: 143 MMOL/L — SIGNIFICANT CHANGE UP (ref 135–145)
SODIUM SERPL-SCNC: 143 MMOL/L — SIGNIFICANT CHANGE UP (ref 135–145)
TSH SERPL-MCNC: 4.92 UIU/ML — HIGH (ref 0.27–4.2)
WBC # BLD: 13.55 K/UL — HIGH (ref 3.8–10.5)
WBC # BLD: 8.12 K/UL — SIGNIFICANT CHANGE UP (ref 3.8–10.5)
WBC # FLD AUTO: 13.55 K/UL — HIGH (ref 3.8–10.5)
WBC # FLD AUTO: 8.12 K/UL — SIGNIFICANT CHANGE UP (ref 3.8–10.5)

## 2021-06-08 PROCEDURE — 99232 SBSQ HOSP IP/OBS MODERATE 35: CPT

## 2021-06-08 PROCEDURE — 71045 X-RAY EXAM CHEST 1 VIEW: CPT | Mod: 26

## 2021-06-08 PROCEDURE — 99291 CRITICAL CARE FIRST HOUR: CPT

## 2021-06-08 PROCEDURE — 33418 REPAIR TCAT MITRAL VALVE: CPT | Mod: 62,Q0

## 2021-06-08 PROCEDURE — 93355 ECHO TRANSESOPHAGEAL (TEE): CPT | Mod: 26

## 2021-06-08 RX ORDER — HEPARIN SODIUM 5000 [USP'U]/ML
5000 INJECTION INTRAVENOUS; SUBCUTANEOUS EVERY 8 HOURS
Refills: 0 | Status: DISCONTINUED | OUTPATIENT
Start: 2021-06-08 | End: 2021-06-09

## 2021-06-08 RX ORDER — ASPIRIN/CALCIUM CARB/MAGNESIUM 324 MG
325 TABLET ORAL ONCE
Refills: 0 | Status: COMPLETED | OUTPATIENT
Start: 2021-06-08 | End: 2021-06-08

## 2021-06-08 RX ORDER — INSULIN HUMAN 100 [IU]/ML
1 INJECTION, SOLUTION SUBCUTANEOUS
Qty: 50 | Refills: 0 | Status: DISCONTINUED | OUTPATIENT
Start: 2021-06-08 | End: 2021-06-08

## 2021-06-08 RX ORDER — PANTOPRAZOLE SODIUM 20 MG/1
40 TABLET, DELAYED RELEASE ORAL DAILY
Refills: 0 | Status: DISCONTINUED | OUTPATIENT
Start: 2021-06-08 | End: 2021-06-08

## 2021-06-08 RX ORDER — AMIODARONE HYDROCHLORIDE 400 MG/1
200 TABLET ORAL DAILY
Refills: 0 | Status: DISCONTINUED | OUTPATIENT
Start: 2021-06-08 | End: 2021-06-09

## 2021-06-08 RX ORDER — DEXTROSE 50 % IN WATER 50 %
50 SYRINGE (ML) INTRAVENOUS
Refills: 0 | Status: DISCONTINUED | OUTPATIENT
Start: 2021-06-08 | End: 2021-06-08

## 2021-06-08 RX ORDER — CEFAZOLIN SODIUM 1 G
2000 VIAL (EA) INJECTION EVERY 8 HOURS
Refills: 0 | Status: DISCONTINUED | OUTPATIENT
Start: 2021-06-08 | End: 2021-06-08

## 2021-06-08 RX ORDER — DEXTROSE 50 % IN WATER 50 %
25 SYRINGE (ML) INTRAVENOUS
Refills: 0 | Status: DISCONTINUED | OUTPATIENT
Start: 2021-06-08 | End: 2021-06-08

## 2021-06-08 RX ORDER — PANTOPRAZOLE SODIUM 20 MG/1
40 TABLET, DELAYED RELEASE ORAL
Refills: 0 | Status: DISCONTINUED | OUTPATIENT
Start: 2021-06-08 | End: 2021-06-09

## 2021-06-08 RX ORDER — SODIUM CHLORIDE 9 MG/ML
1000 INJECTION INTRAMUSCULAR; INTRAVENOUS; SUBCUTANEOUS
Refills: 0 | Status: DISCONTINUED | OUTPATIENT
Start: 2021-06-08 | End: 2021-06-09

## 2021-06-08 RX ORDER — LEVOTHYROXINE SODIUM 125 MCG
25 TABLET ORAL DAILY
Refills: 0 | Status: DISCONTINUED | OUTPATIENT
Start: 2021-06-08 | End: 2021-06-09

## 2021-06-08 RX ORDER — LETROZOLE 2.5 MG/1
2.5 TABLET, FILM COATED ORAL DAILY
Refills: 0 | Status: DISCONTINUED | OUTPATIENT
Start: 2021-06-08 | End: 2021-06-09

## 2021-06-08 RX ORDER — ASPIRIN/CALCIUM CARB/MAGNESIUM 324 MG
81 TABLET ORAL DAILY
Refills: 0 | Status: DISCONTINUED | OUTPATIENT
Start: 2021-06-09 | End: 2021-06-09

## 2021-06-08 RX ORDER — ACETAMINOPHEN 500 MG
1000 TABLET ORAL ONCE
Refills: 0 | Status: DISCONTINUED | OUTPATIENT
Start: 2021-06-08 | End: 2021-06-09

## 2021-06-08 RX ORDER — ATORVASTATIN CALCIUM 80 MG/1
40 TABLET, FILM COATED ORAL
Refills: 0 | Status: DISCONTINUED | OUTPATIENT
Start: 2021-06-08 | End: 2021-06-09

## 2021-06-08 RX ORDER — CEFAZOLIN SODIUM 1 G
2000 VIAL (EA) INJECTION EVERY 12 HOURS
Refills: 0 | Status: DISCONTINUED | OUTPATIENT
Start: 2021-06-08 | End: 2021-06-09

## 2021-06-08 RX ORDER — CHLORHEXIDINE GLUCONATE 213 G/1000ML
5 SOLUTION TOPICAL
Refills: 0 | Status: DISCONTINUED | OUTPATIENT
Start: 2021-06-08 | End: 2021-06-08

## 2021-06-08 RX ADMIN — ATORVASTATIN CALCIUM 40 MILLIGRAM(S): 80 TABLET, FILM COATED ORAL at 22:11

## 2021-06-08 RX ADMIN — Medication 325 MILLIGRAM(S): at 10:51

## 2021-06-08 RX ADMIN — HEPARIN SODIUM 5000 UNIT(S): 5000 INJECTION INTRAVENOUS; SUBCUTANEOUS at 22:11

## 2021-06-08 RX ADMIN — Medication 25 MICROGRAM(S): at 18:32

## 2021-06-08 RX ADMIN — SODIUM CHLORIDE 3 MILLILITER(S): 9 INJECTION INTRAMUSCULAR; INTRAVENOUS; SUBCUTANEOUS at 22:19

## 2021-06-08 RX ADMIN — Medication 2000 MILLIGRAM(S): at 18:31

## 2021-06-08 RX ADMIN — CHLORHEXIDINE GLUCONATE 5 MILLILITER(S): 213 SOLUTION TOPICAL at 18:31

## 2021-06-08 NOTE — CHART NOTE - NSCHARTNOTEFT_GEN_A_CORE
Left groin arterial sheath removed.  Manual pressure held for 20 minutes.  No signs of bleeding or hematoma.  Patient and nursing staff instructed to keep Left leg flat for 2 hrs then can sit up.

## 2021-06-08 NOTE — H&P ADULT - NSICDXPASTMEDICALHX_GEN_ALL_CORE_FT
PAST MEDICAL HISTORY:  Atrial fibrillation on apixaban    Malignant neoplasm of anus s/p radiation    Malignant neoplasm of female breast s/p radiation, lumpectomy 2013    Multiple myeloma

## 2021-06-08 NOTE — BRIEF OPERATIVE NOTE - COMMENTS
Dr. Santana was the first assistant for this case including but not limited to  vascular access, clip deployment, and closure device    I was present for this procedure and participated as first assistant as described by the PA above, unless otherwise noted below.

## 2021-06-08 NOTE — PROGRESS NOTE ADULT - SUBJECTIVE AND OBJECTIVE BOX
HPI:  87 yo F, former smoker, with hx of HTN, HLD, Multiple, Myeloma, Breast CA s/p right lumpectomy, rectal CA s/p radiation treatment, atrial fibrillation s/p DCC 5/2021 (on eliquis), chronic dCHF, and severe MR who presented to outpatient office for evaluation of her valve disease.  She reports feeling fatigued and having FORBES, NYHA class III symptoms.  ROBIN revealed mod-severe MR and anatomy amenable to mitraclip.  She now presentes for elective procedure.  Patient seen in same day holding area; Reports no changes to PMHx or medications since last seen by our team. Denies acute or current SOB, chest pain, palpitation, N/V/D, fever/chills, recent illness, or any other concerning symptoms.  (08 Jun 2021 10:56) pt s/p mitral valve clip        MEDICATIONS  (STANDING):  aMIOdarone    Tablet 200 milliGRAM(s) Oral daily  atorvastatin 40 milliGRAM(s) Oral <User Schedule>  ceFAZolin  Injectable. 2000 milliGRAM(s) IV Push every 12 hours  chlorhexidine 0.12% Liquid 5 milliLiter(s) Oral Mucosa two times a day  dextrose 50% Injectable 50 milliLiter(s) IV Push every 15 minutes  dextrose 50% Injectable 25 milliLiter(s) IV Push every 15 minutes  heparin   Injectable 5000 Unit(s) SubCutaneous every 8 hours  insulin regular Infusion 1 Unit(s)/Hr (1 mL/Hr) IV Continuous <Continuous>  letrozole 2.5 milliGRAM(s) Oral daily  levothyroxine 25 MICROGram(s) Oral daily  pantoprazole  Injectable 40 milliGRAM(s) IV Push daily  sodium chloride 0.9% lock flush 3 milliLiter(s) IV Push every 8 hours  sodium chloride 0.9%. 1000 milliLiter(s) (10 mL/Hr) IV Continuous <Continuous>        PAST MEDICAL & SURGICAL HISTORY:  Atrial fibrillation  on apixaban    Malignant neoplasm of anus  s/p radiation    Malignant neoplasm of female breast  s/p radiation, lumpectomy 2013    Multiple myeloma    No significant past surgical history          REVIEW OF SYSTEMS      General:	    Skin/Breast:  	  Ophthalmologic:  	  ENMT:	    Respiratory and Thorax:  	  Cardiovascular:	    Gastrointestinal:	    Genitourinary:	    Musculoskeletal:	    Neurological:	    Psychiatric:	    Hematology/Lymphatics:	    Endocrine:	    Allergic/Immunologic:	    FAMILY HISTORY:      Allergies    No Known Allergies    Intolerances          Vital Signs Last 24 Hrs  T(C): 36.3 (08 Jun 2021 17:05), Max: 36.3 (08 Jun 2021 15:05)  T(F): 97.4 (08 Jun 2021 17:05), Max: 97.4 (08 Jun 2021 15:05)  HR: 58 (08 Jun 2021 18:00) (57 - 62)  BP: --  BP(mean): --  RR: 18 (08 Jun 2021 18:00) (18 - 20)  SpO2: 100% (08 Jun 2021 18:00) (92% - 100%)      PHYSICAL EXAM:      Constitutional:    Eyes:    ENMT:    Neck:    Breasts:    Back:    Respiratory:breath sounds bilateral    Cardiovascular:s1 s2 2/6 sys murmur    Gastrointestinal:    Genitourinary:    Rectal:    Extremities:    Vascular:    Neurological:    Skin:    Lymph Nodes:    Musculoskeletal:    Psychiatric:                  PT/INR - ( 08 Jun 2021 14:28 )   PT: 15.3 sec;   INR: 1.29          PTT - ( 08 Jun 2021 14:28 )  PTT:31.4 sec      Vital Signs Last 24 Hrs  T(C): 36.3 (08 Jun 2021 17:05), Max: 36.3 (08 Jun 2021 15:05)  T(F): 97.4 (08 Jun 2021 17:05), Max: 97.4 (08 Jun 2021 15:05)  HR: 58 (08 Jun 2021 18:00) (57 - 62)  BP: --  BP(mean): --  RR: 18 (08 Jun 2021 18:00) (18 - 20)  SpO2: 100% (08 Jun 2021 18:00) (92% - 100%)    HEALTH ISSUES - PROBLEM Dx:  mr s/p clip  afib restart anticogulation as per cts  multiple myeloma stable

## 2021-06-08 NOTE — H&P ADULT - NSHPREVIEWOFSYSTEMS_GEN_ALL_CORE
Review of Systems  CONSTITUTIONAL:  Denies Fevers / chills, sweats, fatigue, weight loss, weight gain                                      NEURO:  Denies parathesias, seizures, syncope, confusion                                                                                EYES:  Denies Blurry vision, discharge, pain, loss of vision                                                                                    ENMT:  Denies Difficulty hearing, vertigo, dysphagia, epistaxis, recent dental work                                       CV:  Denies Chest pain, palpitations, FORBES, orthopnea                                                                                          RESPIRATORY:  Denies Wheezing, SOB, cough / sputum, hemoptysis                                                                GI:  Denies Nausea, vommiting, diarrhea, constipation, melena, difficulty swallowing                                               : Denies Hematuria, dysuria, urgency, incontinence                                                                                         MUSKULOSKELETAL:  Denies arthritis, joint swelling, muscle weakness                                                             SKIN/BREAST:  Denies rash, itching, wilfredo loss, masses                                                                                            PSYCH:  Denies depresion, anxiety, suicidal ideation                                                                                               HEME/LYMPH:  Denies bruises easily, enlarged lymph nodes, tender lymph nodes                                        ENDOCRINE:  Denies cold intolerance, heat intolerance, polydipsia

## 2021-06-08 NOTE — H&P ADULT - NSHPSOCIALHISTORY_GEN_ALL_CORE
Social History  Smoker:   NO       Pack Years:       When Quit:  ETOH Use:   NO   Frequency / Quantity:  Ilicit Drug Use:   NO  Occupation: does not work   Assistant Devices:   None  Lives with: alone

## 2021-06-08 NOTE — H&P ADULT - NSHPPHYSICALEXAM_GEN_ALL_CORE
Vitals: HR 64, RR 18, /60, SpO2 96%, T 97.7    Physical Exam  CONSTITUTIONAL:                                                              WNL  NEURO:                                                                       WNL                      EYES:                                                                                WNL  ENMT:                                                                               WNL  CV:                                                                                   WNL  RESPIRATORY:                                                                 WNL  GI:                                                                                     WNL  : SOLARES + / -                                                                  WNL  MUSKULOSKELETAL:                                                       WNL  SKIN / BREAST:                                                                  WNL

## 2021-06-08 NOTE — PROGRESS NOTE ADULT - SUBJECTIVE AND OBJECTIVE BOX
CTICU  CRITICAL  CARE  attending     Hand off received 					   Pertinent clinical, laboratory, radiographic, hemodynamic, echocardiographic, respiratory data, microbiologic data and chart were reviewed and analyzed frequently throughout the course of the day and night  Patient seen and examined with CTS/ SH attending at bedside    Pt is a 89y , Female, s/p mitral clip via percutaneous access; done under conscious sedation    post procedure    on supplemental O2 via nasal canula  mild NAGMA 2/2 RTA      HPI:    89 yo F, former smoker, with hx of HTN, HLD, Multiple, Myeloma, Breast CA s/p right lumpectomy, rectal CA s/p radiation treatment, atrial fibrillation s/p DCC 5/2021 (on eliquis), chronic dCHF, and severe MR who presented to outpatient office for evaluation of her valve disease.  She reports feeling fatigued and having FORBES, NYHA class III symptoms.  ROBIN revealed mod-severe MR and anatomy amenable to mitraclip.  She now presentes for elective procedure.  Patient seen in same day holding area; Reports no changes to PMHx or medications since last seen by our team. Denies acute or current SOB, chest pain, palpitation, N/V/D, fever/chills, recent illness, or any other concerning symptoms.                 , FAMILY HISTORY:  PAST MEDICAL & SURGICAL HISTORY:  Atrial fibrillation  on apixaban    Malignant neoplasm of anus  s/p radiation    Malignant neoplasm of female breast  s/p radiation, lumpectomy 2013    Multiple myeloma    No significant past surgical history      Patient is a 89y old  Female who presents with a chief complaint of mitral regurgitation (08 Jun 2021 18:41)      14 system review limited by post procedural state    Vital signs, hemodynamic and respiratory parameters were reviewed from the bedside nursing flowsheet.  ICU Vital Signs Last 24 Hrs  T(C): 36.3 (08 Jun 2021 17:05), Max: 36.3 (08 Jun 2021 15:05)  T(F): 97.4 (08 Jun 2021 17:05), Max: 97.4 (08 Jun 2021 15:05)  HR: 62 (08 Jun 2021 20:00) (57 - 62)  BP: --  BP(mean): --  ABP: 152/59 (08 Jun 2021 20:00) (114/41 - 157/60)  ABP(mean): 94 (08 Jun 2021 20:00) (66 - 98)  RR: 20 (08 Jun 2021 20:00) (18 - 20)  SpO2: 99% (08 Jun 2021 20:00) (92% - 100%)    Adult Advanced Hemodynamics Last 24 Hrs  CVP(mm Hg): --  CVP(cm H2O): --  CO: --  CI: --  PA: --  PA(mean): --  PCWP: --  SVR: --  SVRI: --  PVR: --  PVRI: --, ABG - ( 08 Jun 2021 14:41 )  pH, Arterial: 7.36  pH, Blood: x     /  pCO2: 34    /  pO2: 82    / HCO3: 19    / Base Excess: -5.6  /  SaO2: x                   Intake and output was reviewed and the fluid balance was calculated  Daily     Daily   I&O's Summary      All lines and drain sites were assessed  Glycemic trend was reviewedCAPILLARY BLOOD GLUCOSE        No acute change in mental status  Auscultation of the chest reveals equal bs  Abdomen is soft  Extremities are warm and well perfused  Wounds appear clean and unremarkable  Antibiotics are periop    labs  CBC Full  -  ( 08 Jun 2021 14:28 )  WBC Count : 13.55 K/uL  RBC Count : 3.53 M/uL  Hemoglobin : 10.4 g/dL  Hematocrit : 32.5 %  Platelet Count - Automated : 177 K/uL  Mean Cell Volume : 92.1 fl  Mean Cell Hemoglobin : 29.5 pg  Mean Cell Hemoglobin Concentration : 32.0 gm/dL  Auto Neutrophil # : x  Auto Lymphocyte # : x  Auto Monocyte # : x  Auto Eosinophil # : x  Auto Basophil # : x  Auto Neutrophil % : x  Auto Lymphocyte % : x  Auto Monocyte % : x  Auto Eosinophil % : x  Auto Basophil % : x    06-08    x   |  x   |  x   ----------------------------<  x   4.0   |  x   |  x     Ca    7.9<L>      08 Jun 2021 14:27  Phos  3.4     06-08  Mg     2.0     06-08    TPro  6.2  /  Alb  3.1<L>  /  TBili  0.4  /  DBili  x   /  AST  See Note  /  ALT  See Note  /  AlkPhos  See Note  06-08    PT/INR - ( 08 Jun 2021 14:28 )   PT: 15.3 sec;   INR: 1.29          PTT - ( 08 Jun 2021 14:28 )  PTT:31.4 sec  The current medications were reviewed   MEDICATIONS  (STANDING):  acetaminophen  IVPB .. 1000 milliGRAM(s) IV Intermittent once  aMIOdarone    Tablet 200 milliGRAM(s) Oral daily  atorvastatin 40 milliGRAM(s) Oral <User Schedule>  ceFAZolin  Injectable. 2000 milliGRAM(s) IV Push every 12 hours  heparin   Injectable 5000 Unit(s) SubCutaneous every 8 hours  letrozole 2.5 milliGRAM(s) Oral daily  levothyroxine 25 MICROGram(s) Oral daily  pantoprazole    Tablet 40 milliGRAM(s) Oral before breakfast  sodium chloride 0.9% lock flush 3 milliLiter(s) IV Push every 8 hours  sodium chloride 0.9%. 1000 milliLiter(s) (10 mL/Hr) IV Continuous <Continuous>    MEDICATIONS  (PRN):       PROBLEM LIST/ ASSESSMENT:  HEALTH ISSUES - PROBLEM Dx:    severe MR  chronic diastolic CHF  metabolic acidosis      ,   Patient is a 89y old  Female who presents with a chief complaint of mitral regurgitation (08 Jun 2021 18:41)     s/p lorie clip via percutaneous access      My plan includes :  close hemodynamic, ventilatory and drain monitoring and management per post op routine    Monitor for arrhythmias and monitor parameters for organ perfusion  monitor neurologic status  Head of the bed should remain elevated to 45 deg .   chest PT and IS will be encouraged  monitor adequacy of oxygenation and ventilation and attempt to wean oxygen  Nutritional goals will be met using po eventually , ensure adequate caloric intake and montior the same  Stress ulcer and VTE prophylaxis will be achieved    Glycemic control is satisfactory  Electrolytes have been repleted as necessary and wound care has been carried out. Pain control has been achieved.   agressive physical therapy and early mobility and ambulation goals will be met   The family was updated about the course and plan  CRITICAL CARE TIME SPENT in evaluation and management, reassessments, review and interpretation of labs and x-rays, ventilator and hemodynamic management, formulating a plan and coordinating care: _55___ MIN.  Time does not include procedural time.  CTICU ATTENDING     					    Ronen Coates MD

## 2021-06-08 NOTE — H&P ADULT - ASSESSMENT
87 yo F, former smoker, with hx of HTN, HLD, Multiple, Myeloma, Breast CA s/p right lumpectomy, rectal CA s/p radiation treatment, atrial fibrillation s/p DCC 5/2021 (on eliquis), chronic dCHF, and severe MR who presented to outpatient office for evaluation of her valve disease.  She reports feeling fatigued and having FORBES, NYHA class III symptoms.  ROBIN revealed mod-severe MR and anatomy amenable to mitraclip.  She now presentes for elective procedure.    Admit under Dr. North via same day surgery.   Consent signed, placed on chart.    Risks/benefits reviewed, patient understands and agrees.   T&S ordered and blood products placed on hold for OR.    To 9 lach, ICU, post-op.

## 2021-06-08 NOTE — BRIEF OPERATIVE NOTE - NSICDXBRIEFPROCEDURE_GEN_ALL_CORE_FT
PROCEDURES:  Percutaneous mitral valve repair with leaflet clip implant 08-Jun-2021 17:20:56 left heart catheterization, mitraclip x 1 Bart Walsh

## 2021-06-08 NOTE — H&P ADULT - HISTORY OF PRESENT ILLNESS
89 yo F, former smoker, with hx of HTN, HLD, Multiple, Myeloma, Breast CA s/p right lumpectomy, rectal CA s/p radiation treatment, atrial fibrillation s/p DCC 5/2021 (on eliquis), chronic dCHF, and severe MR who presented to outpatient office for evaluation of her valve disease.  She reports feeling fatigued and having FORBES, NYHA class III symptoms.  ROBIN revealed mod-severe MR and anatomy amenable to mitraclip.  She now presentes for elective procedure.  Patient seen in same day holding area; Reports no changes to PMHx or medications since last seen by our team. Denies acute or current SOB, chest pain, palpitation, N/V/D, fever/chills, recent illness, or any other concerning symptoms.

## 2021-06-08 NOTE — PROGRESS NOTE ADULT - SUBJECTIVE AND OBJECTIVE BOX
Patient discussed on morning rounds with Dr. North    Operation / Date: 6/8/21 [facility]    SUBJECTIVE ASSESSMENT:  89y Female seen and examined at bedside.  Patient with no complaints.  Denies chest pain, shortness of breath, nausea, vomiting.        Vital Signs Last 24 Hrs  T(C): 36.3 (08 Jun 2021 15:05), Max: 36.3 (08 Jun 2021 15:05)  T(F): 97.4 (08 Jun 2021 15:05), Max: 97.4 (08 Jun 2021 15:05)  HR: 58 (08 Jun 2021 16:15) (57 - 62)  BP: --  BP(mean): --  RR: 18 (08 Jun 2021 16:15) (18 - 18)  SpO2: 100% (08 Jun 2021 16:15) (92% - 100%)  I&O's Detail      CHEST TUBE:  No.   JEANETTE DRAIN:  No.  EPICARDIAL WIRES: No.  TIE DOWNS: No.  SOLARES: No.    PHYSICAL EXAM:    GEN: NAD, looks comfortable  Psych: Mood appropriate  Neuro: A&Ox3.  No focal deficits.  Moving all extremities.   HEENT: No obvious abnormalities  CV: S1S2, regular, no murmurs appreciated.  No carotid bruits.  No JVD  Lungs: Clear B/L.  No wheezing, rales or rhonchi  ABD: Soft, non-tender, non-distended.  +Bowel sounds  EXT: Warm and well perfused.  No peripheral edema noted  Musculoskeletal: Moving all extremities with normal ROM, no joint swelling  PV: Pedal pulses palpable   Incision: Bilateral groin sites CDI, no hematoma    LABS:                        10.4   13.55 )-----------( 177      ( 08 Jun 2021 14:28 )             32.5       COUMADIN:  Yes/No. REASON: .    PT/INR - ( 08 Jun 2021 14:28 )   PT: 15.3 sec;   INR: 1.29          PTT - ( 08 Jun 2021 14:28 )  PTT:31.4 sec    06-08    x   |  x   |  x   ----------------------------<  x   4.0   |  x   |  x     Ca    7.9<L>      08 Jun 2021 14:27  Phos  3.4     06-08  Mg     2.0     06-08    TPro  6.2  /  Alb  3.1<L>  /  TBili  0.4  /  DBili  x   /  AST  See Note  /  ALT  See Note  /  AlkPhos  See Note  06-08          MEDICATIONS  (STANDING):  atorvastatin 40 milliGRAM(s) Oral <User Schedule>  ceFAZolin  Injectable. 2000 milliGRAM(s) IV Push every 12 hours  chlorhexidine 0.12% Liquid 5 milliLiter(s) Oral Mucosa two times a day  dextrose 50% Injectable 50 milliLiter(s) IV Push every 15 minutes  dextrose 50% Injectable 25 milliLiter(s) IV Push every 15 minutes  heparin   Injectable 5000 Unit(s) SubCutaneous every 8 hours  insulin regular Infusion 1 Unit(s)/Hr (1 mL/Hr) IV Continuous <Continuous>  levothyroxine 25 MICROGram(s) Oral daily  pantoprazole  Injectable 40 milliGRAM(s) IV Push daily  sodium chloride 0.9% lock flush 3 milliLiter(s) IV Push every 8 hours  sodium chloride 0.9%. 1000 milliLiter(s) (10 mL/Hr) IV Continuous <Continuous>    MEDICATIONS  (PRN):        RADIOLOGY & ADDITIONAL TESTS:

## 2021-06-09 ENCOUNTER — TRANSCRIPTION ENCOUNTER (OUTPATIENT)
Age: 86
End: 2021-06-09

## 2021-06-09 VITALS
HEART RATE: 60 BPM | DIASTOLIC BLOOD PRESSURE: 58 MMHG | OXYGEN SATURATION: 97 % | RESPIRATION RATE: 17 BRPM | SYSTOLIC BLOOD PRESSURE: 119 MMHG

## 2021-06-09 LAB
ALBUMIN SERPL ELPH-MCNC: 3.1 G/DL — LOW (ref 3.3–5)
ALP SERPL-CCNC: 64 U/L — SIGNIFICANT CHANGE UP (ref 40–120)
ALT FLD-CCNC: 13 U/L — SIGNIFICANT CHANGE UP (ref 10–45)
ANION GAP SERPL CALC-SCNC: 9 MMOL/L — SIGNIFICANT CHANGE UP (ref 5–17)
APTT BLD: 34.5 SEC — SIGNIFICANT CHANGE UP (ref 27.5–35.5)
AST SERPL-CCNC: 25 U/L — SIGNIFICANT CHANGE UP (ref 10–40)
BILIRUB SERPL-MCNC: 0.4 MG/DL — SIGNIFICANT CHANGE UP (ref 0.2–1.2)
BUN SERPL-MCNC: 18 MG/DL — SIGNIFICANT CHANGE UP (ref 7–23)
CALCIUM SERPL-MCNC: 8.2 MG/DL — LOW (ref 8.4–10.5)
CHLORIDE SERPL-SCNC: 111 MMOL/L — HIGH (ref 96–108)
CO2 SERPL-SCNC: 23 MMOL/L — SIGNIFICANT CHANGE UP (ref 22–31)
CREAT SERPL-MCNC: 1.37 MG/DL — HIGH (ref 0.5–1.3)
GAS PNL BLDA: SIGNIFICANT CHANGE UP
GLUCOSE SERPL-MCNC: 99 MG/DL — SIGNIFICANT CHANGE UP (ref 70–99)
HCT VFR BLD CALC: 30.9 % — LOW (ref 34.5–45)
HGB BLD-MCNC: 10 G/DL — LOW (ref 11.5–15.5)
INR BLD: 1.13 — SIGNIFICANT CHANGE UP (ref 0.88–1.16)
MAGNESIUM SERPL-MCNC: 2.2 MG/DL — SIGNIFICANT CHANGE UP (ref 1.6–2.6)
MCHC RBC-ENTMCNC: 29.5 PG — SIGNIFICANT CHANGE UP (ref 27–34)
MCHC RBC-ENTMCNC: 32.4 GM/DL — SIGNIFICANT CHANGE UP (ref 32–36)
MCV RBC AUTO: 91.2 FL — SIGNIFICANT CHANGE UP (ref 80–100)
NRBC # BLD: 0 /100 WBCS — SIGNIFICANT CHANGE UP (ref 0–0)
PHOSPHATE SERPL-MCNC: 3.2 MG/DL — SIGNIFICANT CHANGE UP (ref 2.5–4.5)
PLATELET # BLD AUTO: 188 K/UL — SIGNIFICANT CHANGE UP (ref 150–400)
POTASSIUM SERPL-MCNC: 4.2 MMOL/L — SIGNIFICANT CHANGE UP (ref 3.5–5.3)
POTASSIUM SERPL-SCNC: 4.2 MMOL/L — SIGNIFICANT CHANGE UP (ref 3.5–5.3)
PROT SERPL-MCNC: 6.1 G/DL — SIGNIFICANT CHANGE UP (ref 6–8.3)
PROTHROM AB SERPL-ACNC: 13.5 SEC — SIGNIFICANT CHANGE UP (ref 10.6–13.6)
RBC # BLD: 3.39 M/UL — LOW (ref 3.8–5.2)
RBC # FLD: 14.9 % — HIGH (ref 10.3–14.5)
SODIUM SERPL-SCNC: 143 MMOL/L — SIGNIFICANT CHANGE UP (ref 135–145)
WBC # BLD: 10.67 K/UL — HIGH (ref 3.8–10.5)
WBC # FLD AUTO: 10.67 K/UL — HIGH (ref 3.8–10.5)

## 2021-06-09 PROCEDURE — C1725: CPT

## 2021-06-09 PROCEDURE — 71045 X-RAY EXAM CHEST 1 VIEW: CPT | Mod: 26

## 2021-06-09 PROCEDURE — 86923 COMPATIBILITY TEST ELECTRIC: CPT

## 2021-06-09 PROCEDURE — 84132 ASSAY OF SERUM POTASSIUM: CPT

## 2021-06-09 PROCEDURE — C1887: CPT

## 2021-06-09 PROCEDURE — C1889: CPT

## 2021-06-09 PROCEDURE — 85027 COMPLETE CBC AUTOMATED: CPT

## 2021-06-09 PROCEDURE — 84443 ASSAY THYROID STIM HORMONE: CPT

## 2021-06-09 PROCEDURE — 85025 COMPLETE CBC W/AUTO DIFF WBC: CPT

## 2021-06-09 PROCEDURE — 83036 HEMOGLOBIN GLYCOSYLATED A1C: CPT

## 2021-06-09 PROCEDURE — 84100 ASSAY OF PHOSPHORUS: CPT

## 2021-06-09 PROCEDURE — C1894: CPT

## 2021-06-09 PROCEDURE — 86901 BLOOD TYPING SEROLOGIC RH(D): CPT

## 2021-06-09 PROCEDURE — 85610 PROTHROMBIN TIME: CPT

## 2021-06-09 PROCEDURE — 82803 BLOOD GASES ANY COMBINATION: CPT

## 2021-06-09 PROCEDURE — C1769: CPT

## 2021-06-09 PROCEDURE — 97161 PT EVAL LOW COMPLEX 20 MIN: CPT

## 2021-06-09 PROCEDURE — 83735 ASSAY OF MAGNESIUM: CPT

## 2021-06-09 PROCEDURE — 93312 ECHO TRANSESOPHAGEAL: CPT

## 2021-06-09 PROCEDURE — 85730 THROMBOPLASTIN TIME PARTIAL: CPT

## 2021-06-09 PROCEDURE — 99231 SBSQ HOSP IP/OBS SF/LOW 25: CPT

## 2021-06-09 PROCEDURE — 83880 ASSAY OF NATRIURETIC PEPTIDE: CPT

## 2021-06-09 PROCEDURE — 82330 ASSAY OF CALCIUM: CPT

## 2021-06-09 PROCEDURE — 93306 TTE W/DOPPLER COMPLETE: CPT

## 2021-06-09 PROCEDURE — 36415 COLL VENOUS BLD VENIPUNCTURE: CPT

## 2021-06-09 PROCEDURE — 84295 ASSAY OF SERUM SODIUM: CPT

## 2021-06-09 PROCEDURE — 86900 BLOOD TYPING SEROLOGIC ABO: CPT

## 2021-06-09 PROCEDURE — 86850 RBC ANTIBODY SCREEN: CPT

## 2021-06-09 PROCEDURE — 85018 HEMOGLOBIN: CPT

## 2021-06-09 PROCEDURE — 71045 X-RAY EXAM CHEST 1 VIEW: CPT

## 2021-06-09 PROCEDURE — 80053 COMPREHEN METABOLIC PANEL: CPT

## 2021-06-09 RX ORDER — AMIODARONE HYDROCHLORIDE 400 MG/1
1 TABLET ORAL
Qty: 0 | Refills: 0 | DISCHARGE

## 2021-06-09 RX ORDER — AMLODIPINE BESYLATE 2.5 MG/1
2.5 TABLET ORAL DAILY
Refills: 0 | Status: DISCONTINUED | OUTPATIENT
Start: 2021-06-09 | End: 2021-06-09

## 2021-06-09 RX ORDER — APIXABAN 2.5 MG/1
2.5 TABLET, FILM COATED ORAL
Refills: 0 | Status: DISCONTINUED | OUTPATIENT
Start: 2021-06-09 | End: 2021-06-09

## 2021-06-09 RX ORDER — APIXABAN 2.5 MG/1
1 TABLET, FILM COATED ORAL
Qty: 0 | Refills: 0 | DISCHARGE

## 2021-06-09 RX ORDER — METOPROLOL TARTRATE 50 MG
25 TABLET ORAL DAILY
Refills: 0 | Status: DISCONTINUED | OUTPATIENT
Start: 2021-06-09 | End: 2021-06-09

## 2021-06-09 RX ORDER — APIXABAN 2.5 MG/1
1 TABLET, FILM COATED ORAL
Qty: 0 | Refills: 0 | DISCHARGE
Start: 2021-06-09

## 2021-06-09 RX ORDER — AMIODARONE HYDROCHLORIDE 400 MG/1
0 TABLET ORAL
Qty: 0 | Refills: 0 | DISCHARGE

## 2021-06-09 RX ORDER — CALCIUM GLUCONATE 100 MG/ML
2 VIAL (ML) INTRAVENOUS ONCE
Refills: 0 | Status: COMPLETED | OUTPATIENT
Start: 2021-06-09 | End: 2021-06-09

## 2021-06-09 RX ADMIN — Medication 20 MILLIGRAM(S): at 07:47

## 2021-06-09 RX ADMIN — APIXABAN 2.5 MILLIGRAM(S): 2.5 TABLET, FILM COATED ORAL at 11:33

## 2021-06-09 RX ADMIN — AMIODARONE HYDROCHLORIDE 200 MILLIGRAM(S): 400 TABLET ORAL at 06:29

## 2021-06-09 RX ADMIN — Medication 2000 MILLIGRAM(S): at 06:29

## 2021-06-09 RX ADMIN — LETROZOLE 2.5 MILLIGRAM(S): 2.5 TABLET, FILM COATED ORAL at 11:33

## 2021-06-09 RX ADMIN — Medication 25 MICROGRAM(S): at 06:29

## 2021-06-09 RX ADMIN — HEPARIN SODIUM 5000 UNIT(S): 5000 INJECTION INTRAVENOUS; SUBCUTANEOUS at 06:29

## 2021-06-09 RX ADMIN — SODIUM CHLORIDE 3 MILLILITER(S): 9 INJECTION INTRAMUSCULAR; INTRAVENOUS; SUBCUTANEOUS at 06:30

## 2021-06-09 RX ADMIN — Medication 25 MILLIGRAM(S): at 09:14

## 2021-06-09 RX ADMIN — Medication 200 GRAM(S): at 05:35

## 2021-06-09 RX ADMIN — Medication 81 MILLIGRAM(S): at 11:33

## 2021-06-09 RX ADMIN — SODIUM CHLORIDE 3 MILLILITER(S): 9 INJECTION INTRAMUSCULAR; INTRAVENOUS; SUBCUTANEOUS at 13:21

## 2021-06-09 RX ADMIN — PANTOPRAZOLE SODIUM 40 MILLIGRAM(S): 20 TABLET, DELAYED RELEASE ORAL at 06:29

## 2021-06-09 NOTE — PHYSICAL THERAPY INITIAL EVALUATION ADULT - DISCHARGE DISPOSITION, PT EVAL
home no needs ; pt demonstrates independence with all basic mobility without use of Assistive Device and is cleared by PT, safe for d/c home at this time.

## 2021-06-09 NOTE — PROGRESS NOTE ADULT - ASSESSMENT
87 yo F, former smoker, with hx of HTN, HLD, Multiple, Myeloma, Breast CA s/p right lumpectomy, rectal CA s/p radiation treatment, atrial fibrillation s/p DCC 5/2021 (on eliquis), chronic dCHF, and severe MR who presented to outpatient office for evaluation of her valve disease.  She reports feeling fatigued and having FORBES, NYHA class III symptoms.  ROBIN revealed mod-severe MR and anatomy amenable to mitraclip.  ON 6/8/21 patient underwent mitraclip.      ==== Neurovascular ====  -No delirium  -Monitor neuro status    ==== Respiratory ====  -Saturates well on 6L NC  -Wean as tolerated  -CXR stable, repeat in AM  -Encourage IS 10x/hour while awake, Cough and deep breathing exercises  -Monitor respiratory status via SpO2    ==== Cardiovascular ====  Monitor on Tele  Continue aspirin 81mg QD  Continue Statin  restart toprol xl as tolerated  hold eliquis in setting of recent procedure, restart prior to DC  restart torsemide as indicated  ECHO in AM    ==== GI ====   -Tolerating PO  -Prophylaxis: Protonix  -C/w bowel regimen    ==== /Renal ====  -BUN/Cr: 23/1.49  -Trend Cr on AM labs  -Replete electrolytes as needed    ==== ID ====   Afebrile, asymptomatic  -WBC: 13.55  -Continue to monitor for SIRS/Sepsis syndrome while inpatient    ==== Endocrine ====   -A1C: 6.1, no h/o DM  -TSH: 4.920, no h/o thyroid disease     ==== Hematologic ====   -H/H: 10/32  -CBC, chem in AM  -DVT ppx: HSQ 5000u q8h and SCDs    ==== Disposition Planning ====  Mini-ICU
Ozzie North)  Cardiology; Interventional Cardiology  [address] [place] [address]  Phone: [phone]  Fax: [phone]  Scheduled Appointment: 06/14/2021 09:30 AM    Renay Jules)  Cardiovascular Disease; Internal Medicine  [address], [address]  [place] [address]  Phone: [phone]  Fax: [phone]  Scheduled Appointment: 06/17/2021 03:15 PM

## 2021-06-09 NOTE — PHYSICAL THERAPY INITIAL EVALUATION ADULT - PERTINENT HX OF CURRENT PROBLEM, REHAB EVAL
87 yo F, former smoker, with hx of HTN, HLD, Multiple, Myeloma, Breast CA s/p right lumpectomy, rectal CA s/p radiation treatment, atrial fibrillation s/p DCC 5/2021 (on eliquis), chronic dCHF, and severe MR who presented to outpatient office for evaluation of her valve disease.

## 2021-06-09 NOTE — DISCHARGE NOTE PROVIDER - PROVIDER TOKENS
PROVIDER:[TOKEN:[[id number]],SCHEDULEDAPPT:[06/14/2021],SCHEDULEDAPPTTIME:[09:30 AM]],PROVIDER:[TOKEN:[[id number]],SCHEDULEDAPPT:[06/17/2021],SCHEDULEDAPPTTIME:[03:15 PM]]

## 2021-06-09 NOTE — PHYSICAL THERAPY INITIAL EVALUATION ADULT - GENERAL OBSERVATIONS, REHAB EVAL
Pt received semi-supine no acute distress +tele, cleared for PT by EMMANUEL Garcia, agreeable to PT Eval. Left seated out of bed no acute distress VSS +call carolee, EMMANUEL Garcia aware.

## 2021-06-09 NOTE — DISCHARGE NOTE NURSING/CASE MANAGEMENT/SOCIAL WORK - PATIENT PORTAL LINK FT
You can access the FollowMyHealth Patient Portal offered by [facility] by registering at the following website: [email or web address]. By joining [facility]’s FollowMyHealth portal, you will also be able to view your health information using other applications (apps) compatible with our system.

## 2021-06-09 NOTE — PROGRESS NOTE ADULT - SUBJECTIVE AND OBJECTIVE BOX
`HPI:  89 yo F, former smoker, with hx of HTN, HLD, Multiple, Myeloma, Breast CA s/p right lumpectomy, rectal CA s/p radiation treatment, atrial fibrillation s/p DCC 5/2021 (on eliquis), chronic dCHF, and severe MR who presented to outpatient office for evaluation of her valve disease.  She reports feeling fatigued and having FORBES, NYHA class III symptoms.  ROBIN revealed mod-severe MR and anatomy amenable to mitraclip.  She now presentes for elective procedure.  Patient seen in same day holding area; Reports no changes to PMHx or medications since last seen by our team. Denies acute or current SOB, chest pain, palpitation, N/V/D, fever/chills, recent illness, or any other concerning symptoms.  (08 Jun 2021 10:56)    s/p clip doing weell    MEDICATIONS  (STANDING):  acetaminophen  IVPB .. 1000 milliGRAM(s) IV Intermittent once  aMIOdarone    Tablet 200 milliGRAM(s) Oral daily  aspirin enteric coated 81 milliGRAM(s) Oral daily  atorvastatin 40 milliGRAM(s) Oral <User Schedule>  ceFAZolin  Injectable. 2000 milliGRAM(s) IV Push every 12 hours  letrozole 2.5 milliGRAM(s) Oral daily  levothyroxine 25 MICROGram(s) Oral daily  metoprolol succinate ER 25 milliGRAM(s) Oral daily  pantoprazole    Tablet 40 milliGRAM(s) Oral before breakfast  sodium chloride 0.9% lock flush 3 milliLiter(s) IV Push every 8 hours  torsemide 20 milliGRAM(s) Oral two times a day        PAST MEDICAL & SURGICAL HISTORY:  Atrial fibrillation  on apixaban    Malignant neoplasm of anus  s/p radiation    Malignant neoplasm of female breast  s/p radiation, lumpectomy 2013    Multiple myeloma    No significant past surgical history          REVIEW OF SYSTEMS      General:	    Skin/Breast:  	  Ophthalmologic:  	  ENMT:	    Respiratory and Thorax:  	  Cardiovascular:	    Gastrointestinal:	    Genitourinary:	    Musculoskeletal:	    Neurological:	    Psychiatric:	    Hematology/Lymphatics:	    Endocrine:	    Allergic/Immunologic:	    FAMILY HISTORY:      Allergies    No Known Allergies    Intolerances          Vital Signs Last 24 Hrs  T(C): 36.3 (09 Jun 2021 05:02), Max: 36.5 (09 Jun 2021 01:01)  T(F): 97.4 (09 Jun 2021 05:02), Max: 97.7 (09 Jun 2021 01:01)  HR: 62 (09 Jun 2021 08:41) (57 - 63)  BP: 136/63 (09 Jun 2021 08:41) (136/63 - 136/63)  BP(mean): 91 (09 Jun 2021 08:41) (91 - 91)  RR: 18 (09 Jun 2021 08:41) (14 - 20)  SpO2: 97% (09 Jun 2021 08:41) (92% - 100%)      PHYSICAL EXAM:      Constitutional:    Eyes:    ENMT:    Neck:    Breasts:    Back:    Respiratory:    Cardiovascular:    Gastrointestinal:    Genitourinary:    Rectal:    Extremities:    Vascular:    Neurological:    Skin:    Lymph Nodes:    Musculoskeletal:    Psychiatric:                  PT/INR - ( 09 Jun 2021 04:00 )   PT: 13.5 sec;   INR: 1.13          PTT - ( 09 Jun 2021 04:00 )  PTT:34.5 sec      Vital Signs Last 24 Hrs  T(C): 36.3 (09 Jun 2021 05:02), Max: 36.5 (09 Jun 2021 01:01)  T(F): 97.4 (09 Jun 2021 05:02), Max: 97.7 (09 Jun 2021 01:01)  HR: 62 (09 Jun 2021 08:41) (57 - 63)  BP: 136/63 (09 Jun 2021 08:41) (136/63 - 136/63)  BP(mean): 91 (09 Jun 2021 08:41) (91 - 91)  RR: 18 (09 Jun 2021 08:41) (14 - 20)  SpO2: 97% (09 Jun 2021 08:41) (92% - 100%)    HEALTH ISSUES - PROBLEM Dx:    s/p mitral valve clip doing well  paf restart eliquis as per cts  multiple myeloma

## 2021-06-09 NOTE — PROGRESS NOTE ADULT - SUBJECTIVE AND OBJECTIVE BOX
Patient discussed on morning rounds with Dr. North      Operation / Date: 6/8 mitraclip    Surgeon: Dr. North    Referring Physician: Dr. Jules    SUBJECTIVE ASSESSMENT:  Patient is feeling well, no HA, dizziness, CP, SOB, or palpitations.      Hospital Course:    Vital Signs Last 24 Hrs  T(C): 36.8 (09 Jun 2021 09:00), Max: 36.8 (09 Jun 2021 09:00)  T(F): 98.2 (09 Jun 2021 09:00), Max: 98.2 (09 Jun 2021 09:00)  HR: 62 (09 Jun 2021 08:41) (57 - 63)  BP: 136/63 (09 Jun 2021 08:41) (136/63 - 136/63)  BP(mean): 91 (09 Jun 2021 08:41) (91 - 91)  RR: 18 (09 Jun 2021 08:41) (14 - 20)  SpO2: 97% (09 Jun 2021 08:41) (92% - 100%)  I&O's Detail    08 Jun 2021 07:01  -  09 Jun 2021 07:00  --------------------------------------------------------  IN:    IV PiggyBack: 100 mL    Oral Fluid: 60 mL  Total IN: 160 mL    OUT:    Voided (mL): 350 mL  Total OUT: 350 mL    Total NET: -190 mL    EPICARDIAL WIRES REMOVED: N/A.  TIE DOWNS REMOVED: N/A    PHYSICAL EXAM:    General: OOb in chair, comofortable, NAD    Neurological: A&O x 3 non focal    Cardiovascular: S1S2 RRR No M/G/R    Respiratory: CTA b/l No W/R/R    Gastrointestinal: soft, NT/ND    Extremities: No edema, no calf tenderness b/l    Vascular: warm and well perfused     Incision Sites: b/l groins soft, no palpable hematomas    LABS:                        10.0   10.67 )-----------( 188      ( 09 Jun 2021 04:00 )             30.9       COUMADIN:  No.          PT/INR - ( 09 Jun 2021 04:00 )   PT: 13.5 sec;   INR: 1.13          PTT - ( 09 Jun 2021 04:00 )  PTT:34.5 sec    06-09    143  |  111<H>  |  18  ----------------------------<  99  4.2   |  23  |  1.37<H>    Ca    8.2<L>      09 Jun 2021 04:00  Phos  3.2     06-09  Mg     2.2     06-09    TPro  6.1  /  Alb  3.1<L>  /  TBili  0.4  /  DBili  x   /  AST  25  /  ALT  13  /  AlkPhos  64  06-09    MEDICATIONS  (STANDING):  acetaminophen  IVPB .. 1000 milliGRAM(s) IV Intermittent once  aMIOdarone    Tablet 200 milliGRAM(s) Oral daily  apixaban 2.5 milliGRAM(s) Oral two times a day  aspirin enteric coated 81 milliGRAM(s) Oral daily  atorvastatin 40 milliGRAM(s) Oral <User Schedule>  ceFAZolin  Injectable. 2000 milliGRAM(s) IV Push every 12 hours  letrozole 2.5 milliGRAM(s) Oral daily  levothyroxine 25 MICROGram(s) Oral daily  metoprolol succinate ER 25 milliGRAM(s) Oral daily  pantoprazole    Tablet 40 milliGRAM(s) Oral before breakfast  sodium chloride 0.9% lock flush 3 milliLiter(s) IV Push every 8 hours  torsemide 20 milliGRAM(s) Oral two times a day      Discharge CXR: small unchanged left pleural effusion    Discharge ECHO:     Patient discussed on morning rounds with Dr. North      Operation / Date: 6/8 mitraclip    Surgeon: Dr. North    Referring Physician: Dr. Jules    SUBJECTIVE ASSESSMENT:  Patient is feeling well, no HA, dizziness, CP, SOB, or palpitations.      Hospital Course:  This is an 87 yo F, former smoker, with hx of HTN, HLD, Multiple, Myeloma, Breast CA s/p right lumpectomy, rectal CA s/p radiation treatment, atrial fibrillation s/p DCC 5/2021 (on eliquis), chronic dCHF, and severe MR who presented to outpatient office for evaluation of her valve disease.  She reports feeling fatigued and having FORBES, NYHA class III symptoms.  ROBIN revealed mod-severe MR and anatomy amenable to mitraclip.  On 6/8/21 she had a left heart cath (clean coronaries) and uncomplicated Mitraclip x 1.  Once clip deployed ROBIN revealed improvement in her MR to mild.  She was transferred to CTICU post op in stable condition, extubated, and on no infusions.  POD 1 routine post op TTE revealed mild to moderate MR at BP of 150, no pericardial effusion, and she was delined and restarted on her home medication regimen including eliquis.  She ambulated in the hallway independently on room air with PT and is now clinically stable to be discharged home.     Vital Signs Last 24 Hrs  T(C): 36.8 (09 Jun 2021 09:00), Max: 36.8 (09 Jun 2021 09:00)  T(F): 98.2 (09 Jun 2021 09:00), Max: 98.2 (09 Jun 2021 09:00)  HR: 62 (09 Jun 2021 08:41) (57 - 63)  BP: 136/63 (09 Jun 2021 08:41) (136/63 - 136/63)  BP(mean): 91 (09 Jun 2021 08:41) (91 - 91)  RR: 18 (09 Jun 2021 08:41) (14 - 20)  SpO2: 97% (09 Jun 2021 08:41) (92% - 100%)  I&O's Detail    08 Jun 2021 07:01  -  09 Jun 2021 07:00  --------------------------------------------------------  IN:    IV PiggyBack: 100 mL    Oral Fluid: 60 mL  Total IN: 160 mL    OUT:    Voided (mL): 350 mL  Total OUT: 350 mL    Total NET: -190 mL    EPICARDIAL WIRES REMOVED: N/A.  TIE DOWNS REMOVED: N/A    PHYSICAL EXAM:    General: OOb in chair, comofortable, NAD    Neurological: A&O x 3 non focal    Cardiovascular: S1S2 RRR No M/G/R    Respiratory: CTA b/l No W/R/R    Gastrointestinal: soft, NT/ND    Extremities: No edema, no calf tenderness b/l    Vascular: warm and well perfused     Incision Sites: b/l groins soft, no palpable hematomas    LABS:                        10.0   10.67 )-----------( 188      ( 09 Jun 2021 04:00 )             30.9       COUMADIN:  No.          PT/INR - ( 09 Jun 2021 04:00 )   PT: 13.5 sec;   INR: 1.13          PTT - ( 09 Jun 2021 04:00 )  PTT:34.5 sec    06-09    143  |  111<H>  |  18  ----------------------------<  99  4.2   |  23  |  1.37<H>    Ca    8.2<L>      09 Jun 2021 04:00  Phos  3.2     06-09  Mg     2.2     06-09    TPro  6.1  /  Alb  3.1<L>  /  TBili  0.4  /  DBili  x   /  AST  25  /  ALT  13  /  AlkPhos  64  06-09    MEDICATIONS  (STANDING):  acetaminophen  IVPB .. 1000 milliGRAM(s) IV Intermittent once  aMIOdarone    Tablet 200 milliGRAM(s) Oral daily  apixaban 2.5 milliGRAM(s) Oral two times a day  aspirin enteric coated 81 milliGRAM(s) Oral daily  atorvastatin 40 milliGRAM(s) Oral <User Schedule>  ceFAZolin  Injectable. 2000 milliGRAM(s) IV Push every 12 hours  letrozole 2.5 milliGRAM(s) Oral daily  levothyroxine 25 MICROGram(s) Oral daily  metoprolol succinate ER 25 milliGRAM(s) Oral daily  pantoprazole    Tablet 40 milliGRAM(s) Oral before breakfast  sodium chloride 0.9% lock flush 3 milliLiter(s) IV Push every 8 hours  torsemide 20 milliGRAM(s) Oral two times a day      Discharge CXR: small unchanged left pleural effusion    Discharge ECHO:

## 2021-06-09 NOTE — DISCHARGE NOTE PROVIDER - CARE PROVIDER_API CALL
Ozzie North)  Cardiology; Interventional Cardiology  [address] [place] [address]  Phone: [phone]  Fax: [phone]  Scheduled Appointment: 06/14/2021 09:30 AM    Renay Jules)  Cardiovascular Disease; Internal Medicine  [address], [address]  [place] [address]  Phone: [phone]  Fax: [phone]  Scheduled Appointment: 06/17/2021 03:15 PM

## 2021-06-09 NOTE — DISCHARGE NOTE PROVIDER - HOSPITAL COURSE
This is an 87 yo F, former smoker, with hx of HTN, HLD, Multiple, Myeloma, Breast CA s/p right lumpectomy, rectal CA s/p radiation treatment, atrial fibrillation s/p DCC 5/2021 (on eliquis), chronic dCHF, and severe MR who presented to outpatient office for evaluation of her valve disease.  She reports feeling fatigued and having FORBES, NYHA class III symptoms.  ROBIN revealed mod-severe MR and anatomy amenable to mitraclip.  On 6/8/21 she had a left heart cath (clean coronaries) and uncomplicated Mitraclip x 1.  Once clip deployed ROBIN revealed improvement in her MR to mild.  She was transferred to CTICU post op in stable condition, extubated, and on no infusions.  POD 1 routine post op TTE revealed.... and she was delined and restarted on her home medication regimen including eliquis.  She ambulated in the hallway independently on room air with PT and is now clinically stable to be discharged home. This is an 89 yo F, former smoker, with hx of HTN, HLD, Multiple, Myeloma, Breast CA s/p right lumpectomy, rectal CA s/p radiation treatment, atrial fibrillation s/p DCC 5/2021 (on eliquis), chronic dCHF, and severe MR who presented to outpatient office for evaluation of her valve disease.  She reports feeling fatigued and having FORBES, NYHA class III symptoms.  ROBIN revealed mod-severe MR and anatomy amenable to mitraclip.  On 6/8/21 she had a left heart cath (clean coronaries) and uncomplicated Mitraclip x 1.  Once clip deployed ROBIN revealed improvement in her MR to mild.  She was transferred to CTICU post op in stable condition, extubated, and on no infusions.  POD 1 routine post op TTE revealed mild to moderate MR at BP of 150, no pericardial effusion, and she was delined and restarted on her home medication regimen including eliquis.  She ambulated in the hallway independently on room air with PT and is now clinically stable to be discharged home.

## 2021-06-09 NOTE — DISCHARGE NOTE PROVIDER - NSDCMRMEDTOKEN_GEN_ALL_CORE_FT
amiodarone 200 mg oral tablet: 1 tab(s) orally once a day (at bedtime)  letrozole 2.5 mg oral tablet: 1 tab(s) orally once a day  levothyroxine 25 mcg (0.025 mg) oral tablet: 1 tab(s) orally once a day  Lipitor 40 mg oral tablet: 1 tab(s) orally every other day  metoprolol succinate 25 mg oral tablet, extended release: 1 tab(s) orally once a day  torsemide 20 mg oral tablet: orally 2 times a day   amiodarone 200 mg oral tablet: 1 tab(s) orally once a day  letrozole 2.5 mg oral tablet: 1 tab(s) orally once a day  levothyroxine 25 mcg (0.025 mg) oral tablet: 1 tab(s) orally once a day  Lipitor 40 mg oral tablet: 1 tab(s) orally every other day  metoprolol succinate 25 mg oral tablet, extended release: 1 tab(s) orally once a day  torsemide 20 mg oral tablet: orally 2 times a day   amiodarone 200 mg oral tablet: 1 tab(s) orally once a day  apixaban 2.5 mg oral tablet: 1 tab(s) orally 2 times a day  letrozole 2.5 mg oral tablet: 1 tab(s) orally once a day  levothyroxine 25 mcg (0.025 mg) oral tablet: 1 tab(s) orally once a day  Lipitor 40 mg oral tablet: 1 tab(s) orally every other day  metoprolol succinate 25 mg oral tablet, extended release: 1 tab(s) orally once a day  torsemide 20 mg oral tablet: orally 2 times a day

## 2021-06-09 NOTE — DISCHARGE NOTE PROVIDER - NSDCFUADDAPPT_GEN_ALL_CORE_FT
Please attend your follow up appointments as scheduled.     Your appointment with Dr. North will be telehealth.  The office will call you the day prior with instructions.

## 2021-06-09 NOTE — DISCHARGE NOTE PROVIDER - NSDCFUSCHEDAPPT_GEN_ALL_CORE_FT
ASHISH ROJO ; 06/14/2021 ; [facility] CT Surg [address]   ASHISH ROJO ; 06/17/2021 ; [facility] V[facility] [address]

## 2021-06-09 NOTE — PROGRESS NOTE ADULT - REASON FOR ADMISSION
mitral regurgitation

## 2021-06-09 NOTE — DISCHARGE NOTE PROVIDER - NSDCCPTREATMENT_GEN_ALL_CORE_FT
PRINCIPAL PROCEDURE  Procedure: Percutaneous mitral valve repair with leaflet clip implant  Findings and Treatment: left heart catheterization, mitraclip x 1

## 2021-06-09 NOTE — DISCHARGE NOTE PROVIDER - CARE PROVIDERS DIRECT ADDRESSES
[email or web address]

## 2021-06-09 NOTE — PHYSICAL THERAPY INITIAL EVALUATION ADULT - DIAGNOSIS, PT EVAL
5A: Primary prevention/risk reduction for loss of balance and falling ; 6D: impaired aerobic capacity/endurance associated with cardiovascular pump dysfunction or failure

## 2021-06-14 ENCOUNTER — APPOINTMENT (OUTPATIENT)
Dept: CARDIOTHORACIC SURGERY | Facility: CLINIC | Age: 86
End: 2021-06-14
Payer: MEDICARE

## 2021-06-14 PROCEDURE — 99024 POSTOP FOLLOW-UP VISIT: CPT

## 2021-06-17 DIAGNOSIS — Z85.048 PERSONAL HISTORY OF OTHER MALIGNANT NEOPLASM OF RECTUM, RECTOSIGMOID JUNCTION, AND ANUS: ICD-10-CM

## 2021-06-17 DIAGNOSIS — I50.32 CHRONIC DIASTOLIC (CONGESTIVE) HEART FAILURE: ICD-10-CM

## 2021-06-17 DIAGNOSIS — E07.9 DISORDER OF THYROID, UNSPECIFIED: ICD-10-CM

## 2021-06-17 DIAGNOSIS — E87.2 ACIDOSIS: ICD-10-CM

## 2021-06-17 DIAGNOSIS — I34.0 NONRHEUMATIC MITRAL (VALVE) INSUFFICIENCY: ICD-10-CM

## 2021-06-17 DIAGNOSIS — I11.0 HYPERTENSIVE HEART DISEASE WITH HEART FAILURE: ICD-10-CM

## 2021-06-17 DIAGNOSIS — I48.0 PAROXYSMAL ATRIAL FIBRILLATION: ICD-10-CM

## 2021-06-17 DIAGNOSIS — Z87.81 PERSONAL HISTORY OF (HEALED) TRAUMATIC FRACTURE: ICD-10-CM

## 2021-06-17 DIAGNOSIS — C90.00 MULTIPLE MYELOMA NOT HAVING ACHIEVED REMISSION: ICD-10-CM

## 2021-06-17 DIAGNOSIS — Z79.01 LONG TERM (CURRENT) USE OF ANTICOAGULANTS: ICD-10-CM

## 2021-06-17 DIAGNOSIS — Z85.3 PERSONAL HISTORY OF MALIGNANT NEOPLASM OF BREAST: ICD-10-CM

## 2021-06-17 DIAGNOSIS — E78.5 HYPERLIPIDEMIA, UNSPECIFIED: ICD-10-CM

## 2021-06-17 DIAGNOSIS — Z92.3 PERSONAL HISTORY OF IRRADIATION: ICD-10-CM

## 2021-06-17 DIAGNOSIS — Z00.6 ENCOUNTER FOR EXAMINATION FOR NORMAL COMPARISON AND CONTROL IN CLINICAL RESEARCH PROGRAM: ICD-10-CM

## 2021-06-17 NOTE — HISTORY OF PRESENT ILLNESS
[FreeTextEntry1] : \par This visit was provided via telehealth using real-time 2-way audio visual technology. The patient, ASHISH ROJO, was located at home, [address] [place] [address] , at the time of the visit. The provider was located at [address] [place] [address]. The patient, ASHISH ROJO, Dr. Ozzie North and RENALDO SORTO all participated in the telehealth encounter. Verbal consent given on 06/15/2021 by ASHISH ROJO.\par \par \par 88 year old female, former smoker (quit in 1989), with a history of HTN, hyperlipidemia, multiple myeloma 2018, breast cancer s/p right lumpectomy, rectal cancer s/p radiation treatment, atrial fibrillation s/p DCCV in May 2021 (on Eliquis), chronic diastolic heart failure with mitral regurgitation  s/p Mitraclip on 06/8/2021 who presents for follow up after discharge.  \par \par The patient tolerated the procedure without complications and was discharged home on POD # 1. \par \par Since her discharge, the patient states her breathing has improved greatly. She denies any SOB at rest or with exertion, chest pain, palpitations, dizziness, syncope, or LE edema.  She denies any groin issues, fever or chills.

## 2021-06-24 ENCOUNTER — NON-APPOINTMENT (OUTPATIENT)
Age: 86
End: 2021-06-24

## 2021-06-24 ENCOUNTER — APPOINTMENT (OUTPATIENT)
Dept: HEART AND VASCULAR | Facility: CLINIC | Age: 86
End: 2021-06-24
Payer: MEDICARE

## 2021-06-24 VITALS
HEART RATE: 56 BPM | TEMPERATURE: 97 F | DIASTOLIC BLOOD PRESSURE: 64 MMHG | HEIGHT: 62 IN | SYSTOLIC BLOOD PRESSURE: 134 MMHG | BODY MASS INDEX: 21.9 KG/M2 | WEIGHT: 119 LBS

## 2021-06-24 PROCEDURE — 99213 OFFICE O/P EST LOW 20 MIN: CPT | Mod: 25

## 2021-06-24 PROCEDURE — 93000 ELECTROCARDIOGRAM COMPLETE: CPT

## 2021-06-24 NOTE — PHYSICAL EXAM
[Frail] : frail [Ill-Appearing] : ill-appearing [Murmur] : murmur [Normal] : normal gait [Edema ___] : edema [unfilled] [de-identified] : 2/6 allison

## 2021-06-24 NOTE — REASON FOR VISIT
[FreeTextEntry1] : Patient is status post a mitral valve clip for severe MR patient is still feeling sluggish patient has multiple myeloma but is in remission patient discussed with Dr. Giron who feels she is dwindling pt in nsr on amiodarone consider decreasing to qod

## 2021-06-24 NOTE — REVIEW OF SYSTEMS
[Feeling Fatigued] : feeling fatigued [Dyspnea on exertion] : dyspnea during exertion [Lower Ext Edema] : lower extremity edema [Negative] : ENT

## 2021-06-24 NOTE — ASSESSMENT
[FreeTextEntry1] : Patient status post mitral valve clip will arrange for echocardiogram to evaluate residual MR patient's myeloma is in remission

## 2021-06-25 ENCOUNTER — APPOINTMENT (OUTPATIENT)
Dept: HEART AND VASCULAR | Facility: CLINIC | Age: 86
End: 2021-06-25

## 2021-07-06 ENCOUNTER — APPOINTMENT (OUTPATIENT)
Dept: HEART AND VASCULAR | Facility: CLINIC | Age: 86
End: 2021-07-06

## 2021-07-06 VITALS — DIASTOLIC BLOOD PRESSURE: 59 MMHG | TEMPERATURE: 98 F | SYSTOLIC BLOOD PRESSURE: 144 MMHG | HEART RATE: 57 BPM

## 2021-07-09 ENCOUNTER — RESULT REVIEW (OUTPATIENT)
Age: 86
End: 2021-07-09

## 2021-07-09 ENCOUNTER — APPOINTMENT (OUTPATIENT)
Dept: NEPHROLOGY | Facility: CLINIC | Age: 86
End: 2021-07-09
Payer: MEDICARE

## 2021-07-09 VITALS
HEART RATE: 68 BPM | RESPIRATION RATE: 12 BRPM | DIASTOLIC BLOOD PRESSURE: 58 MMHG | OXYGEN SATURATION: 98 % | SYSTOLIC BLOOD PRESSURE: 118 MMHG

## 2021-07-09 DIAGNOSIS — Z87.898 PERSONAL HISTORY OF OTHER SPECIFIED CONDITIONS: ICD-10-CM

## 2021-07-09 DIAGNOSIS — C21.0 MALIGNANT NEOPLASM OF ANUS, UNSPECIFIED: ICD-10-CM

## 2021-07-09 DIAGNOSIS — Z86.79 PERSONAL HISTORY OF OTHER DISEASES OF THE CIRCULATORY SYSTEM: ICD-10-CM

## 2021-07-09 DIAGNOSIS — Z85.79 PERSONAL HISTORY OF OTHER MALIGNANT NEOPLASMS OF LYMPHOID, HEMATOPOIETIC AND RELATED TISSUES: ICD-10-CM

## 2021-07-09 DIAGNOSIS — Z63.4 DISAPPEARANCE AND DEATH OF FAMILY MEMBER: ICD-10-CM

## 2021-07-09 DIAGNOSIS — Z87.891 PERSONAL HISTORY OF NICOTINE DEPENDENCE: ICD-10-CM

## 2021-07-09 PROCEDURE — 99205 OFFICE O/P NEW HI 60 MIN: CPT

## 2021-07-09 SDOH — SOCIAL STABILITY - SOCIAL INSECURITY: DISSAPEARANCE AND DEATH OF FAMILY MEMBER: Z63.4

## 2021-07-10 ENCOUNTER — APPOINTMENT (OUTPATIENT)
Dept: ULTRASOUND IMAGING | Facility: HOSPITAL | Age: 86
End: 2021-07-10

## 2021-07-10 ENCOUNTER — OUTPATIENT (OUTPATIENT)
Dept: OUTPATIENT SERVICES | Facility: HOSPITAL | Age: 86
LOS: 1 days | End: 2021-07-10
Payer: MEDICARE

## 2021-07-10 PROCEDURE — 76770 US EXAM ABDO BACK WALL COMP: CPT | Mod: 26

## 2021-07-10 PROCEDURE — 76770 US EXAM ABDO BACK WALL COMP: CPT

## 2021-07-11 ENCOUNTER — FORM ENCOUNTER (OUTPATIENT)
Age: 86
End: 2021-07-11

## 2021-07-12 ENCOUNTER — APPOINTMENT (OUTPATIENT)
Dept: CARDIOTHORACIC SURGERY | Facility: CLINIC | Age: 86
End: 2021-07-12
Payer: MEDICARE

## 2021-07-12 ENCOUNTER — OUTPATIENT (OUTPATIENT)
Dept: OUTPATIENT SERVICES | Facility: HOSPITAL | Age: 86
LOS: 1 days | End: 2021-07-12
Payer: MEDICARE

## 2021-07-12 VITALS
RESPIRATION RATE: 16 BRPM | TEMPERATURE: 96.9 F | SYSTOLIC BLOOD PRESSURE: 132 MMHG | WEIGHT: 121 LBS | DIASTOLIC BLOOD PRESSURE: 62 MMHG | BODY MASS INDEX: 22.26 KG/M2 | HEIGHT: 62 IN | HEART RATE: 59 BPM | OXYGEN SATURATION: 91 %

## 2021-07-12 DIAGNOSIS — I34.0 NONRHEUMATIC MITRAL (VALVE) INSUFFICIENCY: ICD-10-CM

## 2021-07-12 LAB
ALBUMIN SERPL ELPH-MCNC: 3.7 G/DL — SIGNIFICANT CHANGE UP (ref 3.3–5)
ALP SERPL-CCNC: 98 U/L — SIGNIFICANT CHANGE UP (ref 40–120)
ALT FLD-CCNC: 14 U/L — SIGNIFICANT CHANGE UP (ref 10–45)
ANION GAP SERPL CALC-SCNC: 13 MMOL/L — SIGNIFICANT CHANGE UP (ref 5–17)
AST SERPL-CCNC: 21 U/L — SIGNIFICANT CHANGE UP (ref 10–40)
BASOPHILS # BLD AUTO: 0.02 K/UL — SIGNIFICANT CHANGE UP (ref 0–0.2)
BASOPHILS NFR BLD AUTO: 0.3 % — SIGNIFICANT CHANGE UP (ref 0–2)
BILIRUB SERPL-MCNC: 0.4 MG/DL — SIGNIFICANT CHANGE UP (ref 0.2–1.2)
BUN SERPL-MCNC: 16 MG/DL — SIGNIFICANT CHANGE UP (ref 7–23)
CALCIUM SERPL-MCNC: 9.1 MG/DL — SIGNIFICANT CHANGE UP (ref 8.4–10.5)
CHLORIDE SERPL-SCNC: 104 MMOL/L — SIGNIFICANT CHANGE UP (ref 96–108)
CO2 SERPL-SCNC: 25 MMOL/L — SIGNIFICANT CHANGE UP (ref 22–31)
CREAT SERPL-MCNC: 1.49 MG/DL — HIGH (ref 0.5–1.3)
EOSINOPHIL # BLD AUTO: 0.03 K/UL — SIGNIFICANT CHANGE UP (ref 0–0.5)
EOSINOPHIL NFR BLD AUTO: 0.5 % — SIGNIFICANT CHANGE UP (ref 0–6)
GLUCOSE SERPL-MCNC: 90 MG/DL — SIGNIFICANT CHANGE UP (ref 70–99)
HCT VFR BLD CALC: 34.4 % — LOW (ref 34.5–45)
HGB BLD-MCNC: 10.6 G/DL — LOW (ref 11.5–15.5)
IMM GRANULOCYTES NFR BLD AUTO: 0.8 % — SIGNIFICANT CHANGE UP (ref 0–1.5)
LYMPHOCYTES # BLD AUTO: 0.85 K/UL — LOW (ref 1–3.3)
LYMPHOCYTES # BLD AUTO: 14 % — SIGNIFICANT CHANGE UP (ref 13–44)
MCHC RBC-ENTMCNC: 28.4 PG — SIGNIFICANT CHANGE UP (ref 27–34)
MCHC RBC-ENTMCNC: 30.8 GM/DL — LOW (ref 32–36)
MCV RBC AUTO: 92.2 FL — SIGNIFICANT CHANGE UP (ref 80–100)
MONOCYTES # BLD AUTO: 0.57 K/UL — SIGNIFICANT CHANGE UP (ref 0–0.9)
MONOCYTES NFR BLD AUTO: 9.4 % — SIGNIFICANT CHANGE UP (ref 2–14)
NEUTROPHILS # BLD AUTO: 4.56 K/UL — SIGNIFICANT CHANGE UP (ref 1.8–7.4)
NEUTROPHILS NFR BLD AUTO: 75 % — SIGNIFICANT CHANGE UP (ref 43–77)
NRBC # BLD: 0 /100 WBCS — SIGNIFICANT CHANGE UP (ref 0–0)
PLATELET # BLD AUTO: 175 K/UL — SIGNIFICANT CHANGE UP (ref 150–400)
POTASSIUM SERPL-MCNC: 3.6 MMOL/L — SIGNIFICANT CHANGE UP (ref 3.5–5.3)
POTASSIUM SERPL-SCNC: 3.6 MMOL/L — SIGNIFICANT CHANGE UP (ref 3.5–5.3)
PROT SERPL-MCNC: 7.4 G/DL — SIGNIFICANT CHANGE UP (ref 6–8.3)
RBC # BLD: 3.73 M/UL — LOW (ref 3.8–5.2)
RBC # FLD: 15.9 % — HIGH (ref 10.3–14.5)
SODIUM SERPL-SCNC: 142 MMOL/L — SIGNIFICANT CHANGE UP (ref 135–145)
WBC # BLD: 6.08 K/UL — SIGNIFICANT CHANGE UP (ref 3.8–10.5)
WBC # FLD AUTO: 6.08 K/UL — SIGNIFICANT CHANGE UP (ref 3.8–10.5)

## 2021-07-12 PROCEDURE — 93306 TTE W/DOPPLER COMPLETE: CPT

## 2021-07-12 PROCEDURE — 93306 TTE W/DOPPLER COMPLETE: CPT | Mod: 26

## 2021-07-12 PROCEDURE — 36415 COLL VENOUS BLD VENIPUNCTURE: CPT

## 2021-07-12 PROCEDURE — 93005 ELECTROCARDIOGRAM TRACING: CPT

## 2021-07-12 PROCEDURE — 85025 COMPLETE CBC W/AUTO DIFF WBC: CPT

## 2021-07-12 PROCEDURE — 93010 ELECTROCARDIOGRAM REPORT: CPT

## 2021-07-12 PROCEDURE — 80053 COMPREHEN METABOLIC PANEL: CPT

## 2021-07-19 RX ORDER — LORATADINE 10 MG
17 TABLET,DISINTEGRATING ORAL DAILY
Qty: 2 | Refills: 1 | Status: ACTIVE | COMMUNITY
Start: 2021-07-09 | End: 1900-01-01

## 2021-07-19 NOTE — PHYSICAL EXAM
[Well Developed] : well developed [Well Nourished] : well nourished [No Acute Distress] : no acute distress [Normal Venous Pressure] : normal venous pressure [No Carotid Bruit] : no carotid bruit [Normal S1, S2] : normal S1, S2 [No Murmur] : no murmur [No Rub] : no rub [No Gallop] : no gallop [Clear Lung Fields] : clear lung fields [Good Air Entry] : good air entry [No Respiratory Distress] : no respiratory distress  [Soft] : abdomen soft [Non Tender] : non-tender [No Masses/organomegaly] : no masses/organomegaly [Normal Bowel Sounds] : normal bowel sounds [Normal Gait] : normal gait [No Rash] : no rash [No Skin Lesions] : no skin lesions [Moves all extremities] : moves all extremities [No Focal Deficits] : no focal deficits [Normal Speech] : normal speech [Alert and Oriented] : alert and oriented [Normal memory] : normal memory [de-identified] : B/l LE edema, non-pitting

## 2021-07-19 NOTE — PHYSICAL EXAM
[General Appearance - Alert] : alert [General Appearance - In No Acute Distress] : in no acute distress [Sclera] : the sclera and conjunctiva were normal [Respiration, Rhythm And Depth] : normal respiratory rhythm and effort [Exaggerated Use Of Accessory Muscles For Inspiration] : no accessory muscle use [Auscultation Breath Sounds / Voice Sounds] : lungs were clear to auscultation bilaterally [Heart Rate And Rhythm] : heart rate was normal and rhythm regular [Heart Sounds] : normal S1 and S2 [Abdomen Soft] : soft [Abdomen Tenderness] : non-tender [Skin Color & Pigmentation] : normal skin color and pigmentation [] : no rash [FreeTextEntry1] : Hearing aids, hard of hearing.

## 2021-07-19 NOTE — REVIEW OF SYSTEMS
[Feeling Tired] : feeling tired [As Noted in HPI] : as noted in HPI [Shortness Of Breath] : shortness of breath [Negative] : Heme/Lymph [Feeling Fatigued] : feeling fatigued [Dyspnea on exertion] : dyspnea during exertion [Lower Ext Edema] : lower extremity edema [Fever] : no fever [Headache] : no headache [Chills] : no chills [SOB] : no shortness of breath [Chest Discomfort] : no chest discomfort [Orthopnea] : no orthopnea [Syncope] : no syncope [Cough] : no cough [Wheezing] : no wheezing [Coughing Up Blood] : no hemoptysis [Abdominal Pain] : no abdominal pain [Vomiting] : no vomiting [Change in Appetite] : no change in appetite [Dysphagia] : no dysphagia [Diarrhea] : diarrhea [Constipation] : no constipation [Joint Pain] : no joint pain [Myalgia] : no myalgia [Rash] : no rash [Dizziness] : no dizziness [Numbness (Hypoesthesia)] : no numbness [Weakness] : no weakness [Limb Weakness (Paresis)] : no limb weakness (Paresis) [Speech Disturbance] : no speech disturbance [Confusion] : no confusion was observed [FreeTextEntry5] : FORBES after 3 city blocks, chronic LE edema.

## 2021-07-19 NOTE — HISTORY OF PRESENT ILLNESS
[FreeTextEntry1] : Patient is an 89 yo F with MM in remission on velcade since 2019, breast cancer s/p lumpectomy, rectal cancer s/p radiation treatment, afib s/p DCCV may 2021 on eliquis,, HTN, dCHF with MR s/p mitraclip 6/2021, who presents for hyperkalemia and to establish CKD care. Patient has had an elevated sCr since at least december 2020 with a sCr of 1.2-1.4, 1.79 january 2021, april 2021 of 1.60, June 2021 (for mitral clip) sCr on admission 1.9, down to 1.40 and 1.37 afterwards. Patient with multiple potassium levels above the normal range however hemolyzed and therefore difficult to interpret, incluing 6.5 in january, 9.7 in april, 6.0 in may, 6.4 and 7.6 in june, and 5.7 most recently on July 7th, with a creatinine of 1.82 and a CO2 of 18. \par Patient states she is otherwise feeling okay, still tired and with some unsteadiness walking although this seems to be a chronic problem/issue, with multiple admissions for dizziness or unsteady gait. Patient states that she was not specifically eating high potassium foods but may have, has not been told about likely chronic kidney disease in the past. Given the fluctuations with hydration and dehydration prior to procedures, most likely some amount of renovascular compromise causing hemodynamic fluctuations in sCr, with a bseline sCr of 1.7-1.8. MM during this time period has been stable with no M spike, on just velcade SQ with prehydration weekly per patient. Patient does not take NSAIDs, over the counter medications such as NSAIDs. Urinates regularly. Defecates every other day, down from regularly once a day to twice a day prior to MM and anal cancer diagnosis, has not been treated for constipation prior. \par On review of diet patient with high fruit intake, including large glasses of orange juice daily with meds - discussed low K diet and given literature.

## 2021-07-19 NOTE — HISTORY OF PRESENT ILLNESS
[FreeTextEntry1] : \par 88 year old female, former smoker (quit in 1989), with a history of HTN, hyperlipidemia, multiple myeloma 2018, breast cancer s/p right lumpectomy, rectal cancer s/p radiation treatment, atrial fibrillation s/p DCCV in May 2021 (on Eliquis), chronic diastolic heart failure with mitral regurgitation  s/p Mitraclip on 06/8/2021 who presents for her one month follow up.\par \par Since her last visit, the patient states that she has not had much symptomatic relief from her Mitraclip and continues to have exertional fatigue and diminished exercise tolerance. She does deny HA, AMS, CP, palpitations, SOB, orthopnea, cough, n/v/d, fever, chills.

## 2021-07-19 NOTE — ASSESSMENT
[FreeTextEntry1] : 88 year old female, former smoker (quit in 1989), with a history of HTN, hyperlipidemia, multiple myeloma 2018, breast cancer s/p right lumpectomy, rectal cancer s/p radiation treatment, atrial fibrillation s/p DCCV in May 2021 (on Eliquis), chronic diastolic heart failure with mitral regurgitation  s/p Mitraclip on 06/8/2021 who presents for her one month follow up.\par \par K[facility]Q completed 7/12/21\par \par 5min walk test: 35sec / 26sec / 30sec\par \par Plan:\par -ECHO and EKG performed and reviewed\par -Hyperkalemia noted on previous labs, repeat labs in clinic today. F/u results. \par -Continue Eliquis 2.5mg BID, Toprol XL 25mg QD, Lipitor 40mg, Torsemide 20mg \par -Patient recommended to follow-up with Dr. Jules regarding dose adjustment for diuretic and antihypertensive regimen\par -Follow-up in clinic in 1 year or sooner if needed

## 2021-07-19 NOTE — REASON FOR VISIT
[Follow-Up: _____] : a [unfilled] follow-up visit [Other: ____] : [unfilled] [FreeTextEntry1] : mitral regurgitation

## 2021-07-19 NOTE — ASSESSMENT
[FreeTextEntry1] : Patient is an 89 yo F with MM in remission on velcade since 2019, breast cancer s/p lumpectomy, rectal cancer s/p radiation treatment, afib s/p DCCV may 2021 on eliquis,, HTN, dCHF with MR s/p mitraclip 6/2021, who presents for hyperkalemia and to establish CKD care.\par \par CKD - likely 2/2 HTN vs prior MM/velcade, now in remission, leading to renovascular disease and hemodynamic fluctuations. Given low GFR, likely overestimation given low body mass, and has been losing weight making relatively stable sCr likely misdiagnostic of worsening kidney function. This is also likely why she has hyperkalemia, given potassium intake on the higher end and inability to excrete. \par - Repeat BMP now with potassium binders, if <4.5 will discuss d/c binders and continue with low K diet and regular bowel movements. UA, urine Na, K, Creatinine, Osm. \par - Renal ultrasound\par - Miralax for at least daily bowel movements\par - Continue lokelma until labs resulted, has month supply but notes high copay\par \par Constipation - miralax as above, increase fiber intake in general\par \par MM - in remission, on velcade weekly sq\par - Dose for GFR <30\par \par HTN - at goal, continue current medication regimen\par \par Will discuss RTC plan after labwork, will likely need close followup and likely 24 hour urine to quantify true GFR, and optimization of labs\par

## 2021-07-21 ENCOUNTER — APPOINTMENT (OUTPATIENT)
Dept: HEART AND VASCULAR | Facility: CLINIC | Age: 86
End: 2021-07-21
Payer: MEDICARE

## 2021-07-21 PROCEDURE — 96372 THER/PROPH/DIAG INJ SC/IM: CPT

## 2021-07-21 RX ORDER — CYANOCOBALAMIN 1000 UG/ML
1000 INJECTION INTRAMUSCULAR; SUBCUTANEOUS
Qty: 0 | Refills: 0 | Status: COMPLETED | OUTPATIENT
Start: 2021-07-21

## 2021-07-21 RX ADMIN — CYANOCOBALAMINE 0 MCG/ML: 1000 INJECTION INTRAMUSCULAR; SUBCUTANEOUS at 00:00

## 2021-07-22 NOTE — PHYSICAL EXAM
[Frail] : frail [Ill-Appearing] : ill-appearing [Murmur] : murmur [Normal] : normal gait [Edema ___] : edema [unfilled] [de-identified] : 2/6 allison

## 2021-07-22 NOTE — HISTORY OF PRESENT ILLNESS
[FreeTextEntry1] : Patient is status post mitral valve clip patient still feels short of breath patient also has multiple myeloma patient is in remission and being treated

## 2021-07-22 NOTE — ASSESSMENT
[FreeTextEntry1] : Patient status post mitral valve clip echocardiogram reviewed mild to moderate mitral regurgitation patient has multiple other problems that may contribute to her fatigue and shortness of breath will follow closely

## 2021-07-29 ENCOUNTER — NON-APPOINTMENT (OUTPATIENT)
Age: 86
End: 2021-07-29

## 2021-08-06 ENCOUNTER — APPOINTMENT (OUTPATIENT)
Dept: OPHTHALMOLOGY | Facility: CLINIC | Age: 86
End: 2021-08-06
Payer: MEDICARE

## 2021-08-06 ENCOUNTER — NON-APPOINTMENT (OUTPATIENT)
Age: 86
End: 2021-08-06

## 2021-08-06 PROCEDURE — 92004 COMPRE OPH EXAM NEW PT 1/>: CPT

## 2021-08-06 PROCEDURE — 92134 CPTRZ OPH DX IMG PST SGM RTA: CPT

## 2021-08-18 ENCOUNTER — APPOINTMENT (OUTPATIENT)
Dept: HEART AND VASCULAR | Facility: CLINIC | Age: 86
End: 2021-08-18
Payer: MEDICARE

## 2021-08-18 VITALS
OXYGEN SATURATION: 98 % | DIASTOLIC BLOOD PRESSURE: 70 MMHG | WEIGHT: 120 LBS | TEMPERATURE: 97.5 F | SYSTOLIC BLOOD PRESSURE: 130 MMHG | BODY MASS INDEX: 22.66 KG/M2 | HEIGHT: 61 IN | HEART RATE: 57 BPM

## 2021-08-18 PROCEDURE — 99213 OFFICE O/P EST LOW 20 MIN: CPT

## 2021-08-19 NOTE — PHYSICAL EXAM
[Frail] : frail [Ill-Appearing] : ill-appearing [Murmur] : murmur [Normal] : normal gait [Edema ___] : edema [unfilled] [de-identified] : 2/6 allison

## 2021-08-19 NOTE — ASSESSMENT
[FreeTextEntry1] : pt to take toresmide qd and see if any improvement pt in remission for multiple myeloma elvated potassium  onlokima k 3.9 will stop and take toremide

## 2021-08-19 NOTE — REASON FOR VISIT
[FreeTextEntry1] : pt has only slight improvement post mitral valve clip pt taking toresmide every other day

## 2021-08-19 NOTE — REVIEW OF SYSTEMS
[Feeling Fatigued] : feeling fatigued [Dyspnea on exertion] : dyspnea during exertion [Lower Ext Edema] : lower extremity edema [Negative] : ENT legal guardian Tabitha Baugh cell [phone] ( patient mother passed away )

## 2021-08-20 ENCOUNTER — APPOINTMENT (OUTPATIENT)
Dept: NEPHROLOGY | Facility: CLINIC | Age: 86
End: 2021-08-20
Payer: MEDICARE

## 2021-08-20 VITALS
DIASTOLIC BLOOD PRESSURE: 65 MMHG | RESPIRATION RATE: 12 BRPM | OXYGEN SATURATION: 99 % | SYSTOLIC BLOOD PRESSURE: 125 MMHG | HEART RATE: 60 BPM

## 2021-08-20 PROCEDURE — 99204 OFFICE O/P NEW MOD 45 MIN: CPT

## 2021-08-20 PROCEDURE — 99214 OFFICE O/P EST MOD 30 MIN: CPT

## 2021-08-20 NOTE — HISTORY OF PRESENT ILLNESS
[FreeTextEntry1] : 88 yo F with MM in remission on velcade since 2019, breast cancer s/p lumpectomy, rectal cancer s/p radiation treatment, afib s/p DCCV may 2021 on eliquis, HTN, dCHF with MR s/p mitraclip 6/2021, who presents for hyperkalemia and to continued CKD Care. Hyperkalemia has resolved, no longer on potassium binder however still with some amount of LE swelling although improved. Denies CP, palpitations, N/V/D. Has been adherent to mostly low K diet, which should help to keep this from happening again as low GFR is large part of this issue, as is constipation. HTN is at goal.

## 2021-08-20 NOTE — CONSULT LETTER
[Dear  ___] : Dear  [unfilled], [Courtesy Letter:] : I had the pleasure of seeing your patient, [unfilled], in my office today. [Please see my note below.] : Please see my note below. [Consult Closing:] : Thank you very much for allowing me to participate in the care of this patient.  If you have any questions, please do not hesitate to contact me. [FreeTextEntry3] : Warm regards,\par Marcos Segovia MD MA  [FreeTextEntry1] : She is doing well, the fluid in her legs has improved. She is more compliant with a low K diet which I believe is the main area of concern with her low GFR, but she is quite resilient in nature and takes it in stride. I agree with the daily dosing of torsemide for now until the LE edema has improved further, unless changes in labs or dizziness result.

## 2021-08-20 NOTE — PHYSICAL EXAM
[General Appearance - Alert] : alert [General Appearance - In No Acute Distress] : in no acute distress [Sclera] : the sclera and conjunctiva were normal [Respiration, Rhythm And Depth] : normal respiratory rhythm and effort [Auscultation Breath Sounds / Voice Sounds] : lungs were clear to auscultation bilaterally [Exaggerated Use Of Accessory Muscles For Inspiration] : no accessory muscle use [Heart Rate And Rhythm] : heart rate was normal and rhythm regular [Heart Sounds] : normal S1 and S2 [FreeTextEntry1] : 1+ edema to above ankle, shins no edema but wrinkled skin patient states improved edema [Abdomen Soft] : soft [Abdomen Tenderness] : non-tender [Skin Color & Pigmentation] : normal skin color and pigmentation [] : no rash

## 2021-08-20 NOTE — ASSESSMENT
[FreeTextEntry1] :  88 yo F with MM in remission on velcade since 2019, breast cancer s/p lumpectomy, rectal cancer s/p radiation treatment, afib s/p DCCV may 2021 on eliquis, HTN, dCHF with MR s/p mitraclip 6/2021, who presents for hyperkalemia and to follow up CKD care\par \par CKD - likely 2/2 HTN vs prior MM/velcade, now in remission, leading to renovascular disease and hemodynamic fluctuations. Given low GFR, likely overestimation given low body mass, and has been losing weight making relatively stable sCr likely misdiagnostic of worsening kidney function. Now s/p course of potassium binders with potassium removal and on low potassium diet, improved and now stable K\par - Miralax for at least daily bowel movements\par \par Constipation - miralax as above, increase fiber intake in general\par \par MM - in remission, on velcade weekly sq\par - Dose for GFR <30\par \par HTN - at goal, on daily torsemide 20mg for now until edema resolves, then likely back to every other day. Continue weekly labs\par \par Will discuss RTC plan after labwork, will likely need close followup and likely 24 hour urine to quantify true GFR, and optimization of labs

## 2021-09-23 ENCOUNTER — APPOINTMENT (OUTPATIENT)
Dept: HEART AND VASCULAR | Facility: CLINIC | Age: 86
End: 2021-09-23

## 2021-09-28 ENCOUNTER — NON-APPOINTMENT (OUTPATIENT)
Age: 86
End: 2021-09-28

## 2021-09-28 ENCOUNTER — APPOINTMENT (OUTPATIENT)
Dept: HEART AND VASCULAR | Facility: CLINIC | Age: 86
End: 2021-09-28
Payer: MEDICARE

## 2021-09-28 VITALS
BODY MASS INDEX: 22.47 KG/M2 | HEIGHT: 61 IN | SYSTOLIC BLOOD PRESSURE: 144 MMHG | DIASTOLIC BLOOD PRESSURE: 70 MMHG | TEMPERATURE: 97.3 F | HEART RATE: 57 BPM | WEIGHT: 119 LBS

## 2021-09-28 PROCEDURE — 93000 ELECTROCARDIOGRAM COMPLETE: CPT

## 2021-09-28 PROCEDURE — 99214 OFFICE O/P EST MOD 30 MIN: CPT | Mod: 25

## 2021-09-29 NOTE — REASON FOR VISIT
[FreeTextEntry1] : Patient with a history of hypertension hyperlipidemia multiple myeloma breast cancer rectal cancer status post radiation atrial fibrillation status post cardioversion on Eliquis chronic diastolic heart failure with mitral regurgitation that was treated which with a mitral valve clip on 6/8/2021 patient says that she has only had mild symptomatic relief but she is beginning to push herself a little further

## 2021-09-29 NOTE — ASSESSMENT
[FreeTextEntry1] : Patient's lower ankle edema has improved patient had elevated potassiums on spironolactone and is being seen by renal patient's multiple myeloma seems to be controlled patient's last echocardiogram only showed mild to moderate mitral regurgitation will continue on current medications patient has noted mild improvement patient is in sinus rhythm on Eliquis

## 2021-09-29 NOTE — PHYSICAL EXAM
[Frail] : frail [Ill-Appearing] : ill-appearing [Murmur] : murmur [Normal] : normal gait [Edema ___] : edema [unfilled] [de-identified] : 2/6 allison

## 2021-10-28 ENCOUNTER — NON-APPOINTMENT (OUTPATIENT)
Age: 86
End: 2021-10-28

## 2021-10-28 ENCOUNTER — APPOINTMENT (OUTPATIENT)
Dept: HEART AND VASCULAR | Facility: CLINIC | Age: 86
End: 2021-10-28
Payer: MEDICARE

## 2021-10-28 PROCEDURE — 93000 ELECTROCARDIOGRAM COMPLETE: CPT

## 2021-10-28 NOTE — HISTORY OF PRESENT ILLNESS
[FreeTextEntry1] : pt s/p mitral clip and ablation for atrial fibrillation pt on toremide less ankle edema

## 2021-11-12 ENCOUNTER — APPOINTMENT (OUTPATIENT)
Dept: NEPHROLOGY | Facility: CLINIC | Age: 86
End: 2021-11-12
Payer: MEDICARE

## 2021-11-12 VITALS — RESPIRATION RATE: 12 BRPM | DIASTOLIC BLOOD PRESSURE: 50 MMHG | SYSTOLIC BLOOD PRESSURE: 92 MMHG | HEART RATE: 60 BPM

## 2021-11-12 PROCEDURE — 99215 OFFICE O/P EST HI 40 MIN: CPT

## 2021-11-12 NOTE — ASSESSMENT
[FreeTextEntry1] : 88 yo F with MM in remission on velcade since 2019, breast cancer s/p lumpectomy, rectal cancer s/p radiation treatment, afib s/p DCCV may 2021 on eliquis, HTN, dCHF with MR s/p mitraclip 6/2021, who presents for hyperkalemia and to follow up CKD care\par \par CKD - likely 2/2 HTN vs prior MM/velcade, now in remission, leading to renovascular disease and hemodynamic fluctuations. Given low GFR, likely overestimation given low body mass, and has been losing weight making relatively stable sCr likely misdiagnostic of worsening kidney function. Hyperkalemia reportedly stable\par - Miralax for at least daily bowel movements, will trial 1/2 dose standing\par \par Constipation - miralax as above, increase fiber intake in general\par \par MM - in remission, on velcade weekly sq\par - Dose for GFR <30\par \par HTN - at goal, on daily torsemide 20mg for now until edema resolves, then likely back to every other day. Continue weekly labs\par \par Weight loss - worsening edema combined with weight loss likely masking how severe the weight loss is - will discuss with heme/onc need for increased calorie intake and/or appetite stimulant\par \par RTC in 1-2 months for fluid overload check\par

## 2021-11-12 NOTE — HISTORY OF PRESENT ILLNESS
[FreeTextEntry1] : 88 yo F with MM in remission on velcade since 2019, breast cancer s/p lumpectomy, rectal cancer s/p radiation treatment, afib s/p DCCV may 2021 on eliquis, HTN, dCHF with MR s/p mitraclip 6/2021, who presents for hyperkalemia and to continue CKD Care. Patient feeling overall okay, has not had issues with hyperkalemia in some time and stools are a little bit better. Taking full dose miralax PRN constipation, still having some times where she is constipated for 2 days at a time before having bowel movement, but otherwise regular. Some leakage still from radiation.Mitral clip did not help much, still fatigued with walking, will not pursue surgery given risk. Is going back to the gym and doing aquarobics class, increasing exercise as possible. Lost about 10 pounds since the summer. Eating about 2 meals, does try to vary with poultry/fish and veggies/fruit, small plates, usually light lunch if anything. \par Will discuss with Dr. Aguilar weight gain stratagies. \par

## 2021-11-12 NOTE — PHYSICAL EXAM
[General Appearance - Alert] : alert [General Appearance - In No Acute Distress] : in no acute distress [Sclera] : the sclera and conjunctiva were normal [Respiration, Rhythm And Depth] : normal respiratory rhythm and effort [Exaggerated Use Of Accessory Muscles For Inspiration] : no accessory muscle use [Auscultation Breath Sounds / Voice Sounds] : lungs were clear to auscultation bilaterally [Heart Rate And Rhythm] : heart rate was normal and rhythm regular [Heart Sounds] : normal S1 and S2 [Abdomen Soft] : soft [Abdomen Tenderness] : non-tender [Skin Color & Pigmentation] : normal skin color and pigmentation [] : no rash [FreeTextEntry1] : 2+ mid shin edema

## 2021-11-30 ENCOUNTER — NON-APPOINTMENT (OUTPATIENT)
Age: 86
End: 2021-11-30

## 2021-11-30 ENCOUNTER — APPOINTMENT (OUTPATIENT)
Dept: HEART AND VASCULAR | Facility: CLINIC | Age: 86
End: 2021-11-30
Payer: MEDICARE

## 2021-11-30 VITALS
DIASTOLIC BLOOD PRESSURE: 62 MMHG | OXYGEN SATURATION: 96 % | SYSTOLIC BLOOD PRESSURE: 140 MMHG | HEIGHT: 60 IN | HEART RATE: 62 BPM | BODY MASS INDEX: 22.78 KG/M2 | WEIGHT: 116 LBS | TEMPERATURE: 97.6 F

## 2021-11-30 PROCEDURE — 99214 OFFICE O/P EST MOD 30 MIN: CPT | Mod: 25

## 2021-11-30 PROCEDURE — 93000 ELECTROCARDIOGRAM COMPLETE: CPT

## 2021-12-01 NOTE — REASON FOR VISIT
[FreeTextEntry1] : Patient with multiple medical problems multiple myeloma which she is going ongoing treatment status post mitral valve clip status post ablation for atrial fibrillation congestive heart failure history of rectal and breast carcinoma history of malignant melanoma who has bilateral ankle edema and shortness of breath patient has multiple myeloma is in remission with ongoing treatment patient's multiple myeloma patient's multiple myeloma is in remission

## 2021-12-09 ENCOUNTER — APPOINTMENT (OUTPATIENT)
Dept: HEART AND VASCULAR | Facility: CLINIC | Age: 86
End: 2021-12-09
Payer: MEDICARE

## 2021-12-09 PROCEDURE — 99213 OFFICE O/P EST LOW 20 MIN: CPT

## 2021-12-09 NOTE — REASON FOR VISIT
[FreeTextEntry1] : Patient with cellulitis patient has multiple myeloma paroxysmal atrial fibrillation on Eliquis and multiple other medical problems

## 2022-01-13 ENCOUNTER — RX RENEWAL (OUTPATIENT)
Age: 87
End: 2022-01-13

## 2022-01-25 ENCOUNTER — APPOINTMENT (OUTPATIENT)
Dept: HEART AND VASCULAR | Facility: CLINIC | Age: 87
End: 2022-01-25
Payer: MEDICARE

## 2022-01-25 ENCOUNTER — NON-APPOINTMENT (OUTPATIENT)
Age: 87
End: 2022-01-25

## 2022-01-25 VITALS
HEART RATE: 59 BPM | DIASTOLIC BLOOD PRESSURE: 70 MMHG | WEIGHT: 115 LBS | HEIGHT: 60 IN | TEMPERATURE: 96.8 F | BODY MASS INDEX: 22.58 KG/M2 | SYSTOLIC BLOOD PRESSURE: 160 MMHG

## 2022-01-25 PROCEDURE — 93000 ELECTROCARDIOGRAM COMPLETE: CPT

## 2022-01-25 PROCEDURE — 99214 OFFICE O/P EST MOD 30 MIN: CPT | Mod: 25

## 2022-01-25 NOTE — ASSESSMENT
[FreeTextEntry1] : Patient with multiple medical problems status post mitral valve clip patient is slightly better patient's edema is stable patient gets fatigued patient with possible right pleural effusion sent for chest x-ray\par Patient with atrial fibrillation status post ablation who is in sinus bradycardia with frequent APCs\par Patient with multiple myeloma in remission discussed with Dr. Giron who on the next blood draws will check thyroid functions as well

## 2022-02-08 ENCOUNTER — OUTPATIENT (OUTPATIENT)
Dept: OUTPATIENT SERVICES | Facility: HOSPITAL | Age: 87
LOS: 1 days | End: 2022-02-08
Payer: MEDICARE

## 2022-02-08 PROCEDURE — 71046 X-RAY EXAM CHEST 2 VIEWS: CPT

## 2022-02-08 PROCEDURE — 71046 X-RAY EXAM CHEST 2 VIEWS: CPT | Mod: 26

## 2022-03-03 ENCOUNTER — APPOINTMENT (OUTPATIENT)
Dept: OPHTHALMOLOGY | Facility: CLINIC | Age: 87
End: 2022-03-03
Payer: MEDICARE

## 2022-03-03 ENCOUNTER — NON-APPOINTMENT (OUTPATIENT)
Age: 87
End: 2022-03-03

## 2022-03-03 PROCEDURE — 92012 INTRM OPH EXAM EST PATIENT: CPT

## 2022-03-04 ENCOUNTER — NON-APPOINTMENT (OUTPATIENT)
Age: 87
End: 2022-03-04

## 2022-03-04 ENCOUNTER — APPOINTMENT (OUTPATIENT)
Dept: NEPHROLOGY | Facility: CLINIC | Age: 87
End: 2022-03-04
Payer: MEDICARE

## 2022-03-04 VITALS
BODY MASS INDEX: 22.66 KG/M2 | HEART RATE: 61 BPM | SYSTOLIC BLOOD PRESSURE: 127 MMHG | DIASTOLIC BLOOD PRESSURE: 68 MMHG | RESPIRATION RATE: 12 BRPM | WEIGHT: 116 LBS

## 2022-03-04 PROCEDURE — 99215 OFFICE O/P EST HI 40 MIN: CPT

## 2022-03-05 NOTE — HISTORY OF PRESENT ILLNESS
[FreeTextEntry1] : 89 yo F with MM in remission on velcade since 2019, breast cancer s/p lumpectomy, rectal cancer s/p radiation treatment, afib s/p DCCV may 2021 on eliquis, HTN, dCHF with MR s/p mitraclip 6/2021, who presents for hyperkalemia and to continue CKD Care.\par \par Hyperkalemia has been stable and CKD has been stable per patient, awaiting labs from Dr. Giron office\par \par Feels a little less stable on her feet overall so not traveling around the city . Encouraged to use PT, discussed exercises she can do from home. Will discuss water exercises with Dr. Giron. \par Has not been exercising, still overall weight is trending down but slower,  Eating and drinknig well with concentration on high quality protein and dense meals\par Cut down on alcohol as would get drunk on two glasses of wine, down to one wine spritzer (half glass of wine). \par Hair has been falling out, likely 2/2 to chemo, discussed vitamins and strategies to keep. Otherwise no major complaints\par \par

## 2022-03-05 NOTE — PHYSICAL EXAM
[General Appearance - Alert] : alert [General Appearance - In No Acute Distress] : in no acute distress [Neck Appearance] : the appearance of the neck was normal [Jugular Venous Distention Increased] : there was no jugular-venous distention [Respiration, Rhythm And Depth] : normal respiratory rhythm and effort [Exaggerated Use Of Accessory Muscles For Inspiration] : no accessory muscle use [Auscultation Breath Sounds / Voice Sounds] : lungs were clear to auscultation bilaterally [Heart Rate And Rhythm] : heart rate was normal and rhythm regular [Heart Sounds] : normal S1 and S2 [Heart Sounds Gallop] : no gallops [Murmurs] : no murmurs [Heart Sounds Pericardial Friction Rub] : no pericardial rub [Veins - Varicosity Changes] : there were no varicosital changes [No CVA Tenderness] : no ~M costovertebral angle tenderness [No Spinal Tenderness] : no spinal tenderness [Skin Color & Pigmentation] : normal skin color and pigmentation [Skin Turgor] : normal skin turgor [] : no rash [FreeTextEntry1] : hair thinning

## 2022-03-05 NOTE — ASSESSMENT
[FreeTextEntry1] : 89 yo F with MM in remission on velcade since 2019, breast cancer s/p lumpectomy, rectal cancer s/p radiation treatment, afib s/p DCCV may 2021 on eliquis, HTN, dCHF with MR s/p mitraclip 6/2021, who presents for hyperkalemia and to continue CKD Care\par \par CKD - likely 2/2 HTN vs prior MM/velcade, now in remission, leading to renovascular disease and hemodynamic fluctuations. Given low GFR, likely overestimation given low body mass, and has been losing weight making relatively stable sCr likely misdiagnostic of worsening kidney function. Hyperkalemia reportedly stable\par - Miralax for at least daily bowel movements, will trial 1/2 dose standing\par - Awaiting labs from Dr. Giron office\par - Continue current meds and diet, focus on high quality protein and calories\par \par Constipation - miralax as above, increase fiber intake in general\par \par MM - in remission, on velcade weekly sq\par - Dose for GFR <30\par \par HTN - at goal, on daily torsemide 20mg for now until edema resolves, then likely back to every other day. Continue weekly labs\par \par Weight loss - worsening edema combined with weight loss likely masking how severe the weight loss is - will discuss with heme/onc need for increased calorie intake and/or appetite stimulant, appears to be stable from last visit\par \par RTC in 1-2 months for fluid check

## 2022-03-09 ENCOUNTER — APPOINTMENT (OUTPATIENT)
Dept: HEART AND VASCULAR | Facility: CLINIC | Age: 87
End: 2022-03-09
Payer: MEDICARE

## 2022-03-09 VITALS
TEMPERATURE: 97.4 F | DIASTOLIC BLOOD PRESSURE: 66 MMHG | BODY MASS INDEX: 21.9 KG/M2 | HEART RATE: 62 BPM | WEIGHT: 116 LBS | HEIGHT: 61 IN | SYSTOLIC BLOOD PRESSURE: 114 MMHG

## 2022-03-09 PROCEDURE — 93000 ELECTROCARDIOGRAM COMPLETE: CPT

## 2022-03-09 PROCEDURE — 99213 OFFICE O/P EST LOW 20 MIN: CPT | Mod: 25

## 2022-03-09 NOTE — ASSESSMENT
[FreeTextEntry1] : will d/c toprol xl pt taking 12.5mg qod continue amiodarone 100 mg every other day patient is in sinus rhythm patient is in remission for multiple myeloma with ongoing treatment by Dr. Giron patient has chronic renal failure patient is status post breast cancer anal cancer and thyroid cancer

## 2022-03-09 NOTE — REASON FOR VISIT
[FreeTextEntry1] : Patient feeling better since we decreased her Toprol and amiodarone dose still feels fatigued after breakfast but then feels better during the day

## 2022-03-25 NOTE — H&P ADULT - PROBLEM/PLAN-2
-- DO NOT REPLY / DO NOT REPLY ALL --  -- Message is from the [facility]--    General Patient Message      Reason for Call patient is returning the doctor call as requested. Please call back today.    Caller Information       Type Contact Phone    03/24/2022 03:28 PM CDT Phone (Incoming) Ruma Juárez (Self) [phone] (H)          Alternative phone number none    Turnaround time given to caller:   \"This message will be sent to [state Provider's name]. The clinical team will fulfill your request as soon as they review your message.\"    
Called pt, notified of her lab results. Pt will come in today to do her A1C lab test. No further questions.  
Please get details.  Patient might be calling to update us regarding her health condition.  Thank you  
DISPLAY PLAN FREE TEXT

## 2022-04-20 ENCOUNTER — APPOINTMENT (OUTPATIENT)
Dept: HEART AND VASCULAR | Facility: CLINIC | Age: 87
End: 2022-04-20
Payer: MEDICARE

## 2022-04-20 VITALS
HEIGHT: 61 IN | DIASTOLIC BLOOD PRESSURE: 65 MMHG | WEIGHT: 116 LBS | SYSTOLIC BLOOD PRESSURE: 137 MMHG | BODY MASS INDEX: 21.9 KG/M2 | HEART RATE: 71 BPM | OXYGEN SATURATION: 73 % | TEMPERATURE: 97.2 F

## 2022-04-20 DIAGNOSIS — E03.9 HYPOTHYROIDISM, UNSPECIFIED: ICD-10-CM

## 2022-04-20 PROCEDURE — 99214 OFFICE O/P EST MOD 30 MIN: CPT

## 2022-04-20 NOTE — ASSESSMENT
[FreeTextEntry1] : Will continue patient on lower dose of amiodarone patient is symptomatically better off beta-blockers will evaluate her mitral valve clip and mitral regurgitation with an echocardiogram will discuss with Dr. Giron status of her multiple myeloma and obtain the latest laboratory work from him

## 2022-04-20 NOTE — HISTORY OF PRESENT ILLNESS
[FreeTextEntry1] : Mrs. Aguilar is finally feeling better status post mitral valve clip status post cardioversion for A. fib we reduced her dose of amiodarone to 200 mg every other day and stopped the beta-blocker patient is now able to walk a few blocks

## 2022-06-09 ENCOUNTER — APPOINTMENT (OUTPATIENT)
Dept: HEART AND VASCULAR | Facility: CLINIC | Age: 87
End: 2022-06-09
Payer: MEDICARE

## 2022-06-09 ENCOUNTER — NON-APPOINTMENT (OUTPATIENT)
Age: 87
End: 2022-06-09

## 2022-06-09 VITALS
OXYGEN SATURATION: 80 % | TEMPERATURE: 97.8 F | HEIGHT: 61 IN | BODY MASS INDEX: 21.9 KG/M2 | SYSTOLIC BLOOD PRESSURE: 131 MMHG | DIASTOLIC BLOOD PRESSURE: 73 MMHG | HEART RATE: 66 BPM | WEIGHT: 116 LBS

## 2022-06-09 DIAGNOSIS — G45.0 VERTEBRO-BASILAR ARTERY SYNDROME: ICD-10-CM

## 2022-06-09 DIAGNOSIS — R06.00 DYSPNEA, UNSPECIFIED: ICD-10-CM

## 2022-06-09 PROCEDURE — 99215 OFFICE O/P EST HI 40 MIN: CPT | Mod: 25

## 2022-06-09 PROCEDURE — 93000 ELECTROCARDIOGRAM COMPLETE: CPT

## 2022-06-10 ENCOUNTER — APPOINTMENT (OUTPATIENT)
Dept: NEPHROLOGY | Facility: CLINIC | Age: 87
End: 2022-06-10
Payer: MEDICARE

## 2022-06-10 VITALS — HEART RATE: 70 BPM | DIASTOLIC BLOOD PRESSURE: 56 MMHG | RESPIRATION RATE: 12 BRPM | SYSTOLIC BLOOD PRESSURE: 118 MMHG

## 2022-06-10 DIAGNOSIS — K59.00 CONSTIPATION, UNSPECIFIED: ICD-10-CM

## 2022-06-10 DIAGNOSIS — E46 UNSPECIFIED PROTEIN-CALORIE MALNUTRITION: ICD-10-CM

## 2022-06-10 PROCEDURE — 99215 OFFICE O/P EST HI 40 MIN: CPT

## 2022-06-10 RX ORDER — NUT.TX.GLUCOSE INTOLERANCE,SOY
BAR ORAL
Qty: 60 | Refills: 1 | Status: ACTIVE | COMMUNITY
Start: 2022-06-10 | End: 1900-01-01

## 2022-06-10 NOTE — ASSESSMENT
[FreeTextEntry1] : 89 yo F with MM in remission on velcade since 2019, breast cancer s/p lumpectomy, rectal cancer s/p radiation treatment, afib s/p DCCV may 2021 on eliquis, HTN, dCHF with MR s/p mitraclip 6/2021, who presents for hyperkalemia and to continue CKD Care.\par \par CKD - likely 2/2 HTN vs prior MM/velcade, now in remission, leading to renovascular disease and hemodynamic fluctuations. Given low GFR, likely overestimation given low body mass, and has been losing weight making relatively stable sCr likely misdiagnostic of worsening kidney function. Hyperkalemia reportedly stable\par - Miralax for at least daily bowel movements\par - Awaiting labs from Dr. Giron office\par - Continue current meds and diet, focus on high quality protein and calories\par \par Constipation - miralax as above, increase fiber intake in general\par \par MM - in remission, on velcade weekly sq\par - Dose for GFR <30\par \par HTN - at goal, on daily torsemide 20mg for now until edema resolves, then likely back to every other day. Continue weekly labs\par \par LE edema - Ace bandage wrap - more than likely vascular in addition to fluid overload given improvement patient sees with lifting legs alone. Has been seen with chemotherapy and given her history quite likely. \par \par Hyperkalemia - improved with better bowel movements, continue fo rnow\par \par Weight loss - worsening edema combined with weight loss likely masking how severe the weight loss is - will trial ensure or nepro, sent nepro to pharmacy\par \par RTC in 1-2 months for fluid check. \par

## 2022-06-10 NOTE — PHYSICAL EXAM
[General Appearance - Alert] : alert [General Appearance - In No Acute Distress] : in no acute distress [Neck Appearance] : the appearance of the neck was normal [Jugular Venous Distention Increased] : there was no jugular-venous distention [Respiration, Rhythm And Depth] : normal respiratory rhythm and effort [Exaggerated Use Of Accessory Muscles For Inspiration] : no accessory muscle use [Auscultation Breath Sounds / Voice Sounds] : lungs were clear to auscultation bilaterally [Heart Rate And Rhythm] : heart rate was normal and rhythm regular [Heart Sounds] : normal S1 and S2 [Heart Sounds Gallop] : no gallops [Murmurs] : no murmurs [Heart Sounds Pericardial Friction Rub] : no pericardial rub [Veins - Varicosity Changes] : there were no varicosital changes [No CVA Tenderness] : no ~M costovertebral angle tenderness [No Spinal Tenderness] : no spinal tenderness [Skin Color & Pigmentation] : normal skin color and pigmentation [Skin Turgor] : normal skin turgor [] : no rash [FreeTextEntry1] : hair thinning; positive blueish fingertips

## 2022-06-10 NOTE — HISTORY OF PRESENT ILLNESS
[FreeTextEntry1] : 89 yo F with MM in remission on velcade since 2019, breast cancer s/p lumpectomy, rectal cancer s/p radiation treatment, afib s/p DCCV may 2021 on eliquis, HTN, dCHF with MR s/p mitraclip 6/2021, who presents for hyperkalemia and to continue CKD Care.\par \par sCr reviewed, somewhat more elevated to 1.8 but within her usual range\par \par Presenting for follow up, accompanied by friend nazario. \par \par Had flashing lights in left eye with noxious feeling monday, light flashing has gone away but still intermittent noxious feeling\par \par Bree showed orthostatic hypotension, down to 100. \par \par Still very afraid of falling, having unsteadiness at least every day after breakfast. \par \par Continuing to have issues with weight, has not been able to increase exercise at all, contnues to have edema\par \par Medications: Eliquis, breast ca pill, amiodarone, torsemide 20mg is now taking every day, had a 4 day period where she was on two pills every day which improved edema , atorvastatin, synthroid, \par \par Not takin miralax, doing better with constipation

## 2022-06-11 ENCOUNTER — INPATIENT (INPATIENT)
Facility: HOSPITAL | Age: 87
LOS: 1 days | Discharge: HOME HEALTH | DRG: 312 | End: 2022-06-13
Attending: HOSPITALIST | Admitting: INTERNAL MEDICINE
Payer: MEDICARE

## 2022-06-11 VITALS
DIASTOLIC BLOOD PRESSURE: 65 MMHG | TEMPERATURE: 98 F | HEART RATE: 74 BPM | OXYGEN SATURATION: 98 % | SYSTOLIC BLOOD PRESSURE: 122 MMHG | RESPIRATION RATE: 18 BRPM | HEIGHT: 62 IN | WEIGHT: 117.07 LBS

## 2022-06-11 DIAGNOSIS — Z29.9 ENCOUNTER FOR PROPHYLACTIC MEASURES, UNSPECIFIED: ICD-10-CM

## 2022-06-11 DIAGNOSIS — I95.1 ORTHOSTATIC HYPOTENSION: ICD-10-CM

## 2022-06-11 DIAGNOSIS — I50.32 CHRONIC DIASTOLIC (CONGESTIVE) HEART FAILURE: ICD-10-CM

## 2022-06-11 DIAGNOSIS — N18.9 CHRONIC KIDNEY DISEASE, UNSPECIFIED: ICD-10-CM

## 2022-06-11 DIAGNOSIS — I48.91 UNSPECIFIED ATRIAL FIBRILLATION: ICD-10-CM

## 2022-06-11 DIAGNOSIS — C90.00 MULTIPLE MYELOMA NOT HAVING ACHIEVED REMISSION: ICD-10-CM

## 2022-06-11 PROBLEM — G45.0 VERTEBROBASILAR ARTERY SYNDROME: Status: ACTIVE | Noted: 2018-05-16

## 2022-06-11 PROBLEM — R06.00 DYSPNEA ON EXERTION: Status: ACTIVE | Noted: 2017-07-11

## 2022-06-11 LAB
ALBUMIN SERPL ELPH-MCNC: 3.8 G/DL — SIGNIFICANT CHANGE UP (ref 3.3–5)
ALP SERPL-CCNC: 142 U/L — HIGH (ref 40–120)
ALT FLD-CCNC: 15 U/L — SIGNIFICANT CHANGE UP (ref 10–45)
ANION GAP SERPL CALC-SCNC: 12 MMOL/L — SIGNIFICANT CHANGE UP (ref 5–17)
APTT BLD: 32 SEC — SIGNIFICANT CHANGE UP (ref 27.5–35.5)
AST SERPL-CCNC: 20 U/L — SIGNIFICANT CHANGE UP (ref 10–40)
BASOPHILS # BLD AUTO: 0.05 K/UL — SIGNIFICANT CHANGE UP (ref 0–0.2)
BASOPHILS NFR BLD AUTO: 0.6 % — SIGNIFICANT CHANGE UP (ref 0–2)
BILIRUB SERPL-MCNC: 0.3 MG/DL — SIGNIFICANT CHANGE UP (ref 0.2–1.2)
BUN SERPL-MCNC: 27 MG/DL — HIGH (ref 7–23)
CALCIUM SERPL-MCNC: 8.8 MG/DL — SIGNIFICANT CHANGE UP (ref 8.4–10.5)
CHLORIDE SERPL-SCNC: 103 MMOL/L — SIGNIFICANT CHANGE UP (ref 96–108)
CO2 SERPL-SCNC: 28 MMOL/L — SIGNIFICANT CHANGE UP (ref 22–31)
CREAT SERPL-MCNC: 1.58 MG/DL — HIGH (ref 0.5–1.3)
EGFR: 31 ML/MIN/1.73M2 — LOW
EOSINOPHIL # BLD AUTO: 0.19 K/UL — SIGNIFICANT CHANGE UP (ref 0–0.5)
EOSINOPHIL NFR BLD AUTO: 2.2 % — SIGNIFICANT CHANGE UP (ref 0–6)
GLUCOSE SERPL-MCNC: 122 MG/DL — HIGH (ref 70–99)
HCT VFR BLD CALC: 34.3 % — LOW (ref 34.5–45)
HGB BLD-MCNC: 10.8 G/DL — LOW (ref 11.5–15.5)
IMM GRANULOCYTES NFR BLD AUTO: 0.5 % — SIGNIFICANT CHANGE UP (ref 0–1.5)
INR BLD: 1.4 — HIGH (ref 0.88–1.16)
LYMPHOCYTES # BLD AUTO: 1.46 K/UL — SIGNIFICANT CHANGE UP (ref 1–3.3)
LYMPHOCYTES # BLD AUTO: 16.8 % — SIGNIFICANT CHANGE UP (ref 13–44)
MCHC RBC-ENTMCNC: 28.2 PG — SIGNIFICANT CHANGE UP (ref 27–34)
MCHC RBC-ENTMCNC: 31.5 GM/DL — LOW (ref 32–36)
MCV RBC AUTO: 89.6 FL — SIGNIFICANT CHANGE UP (ref 80–100)
MONOCYTES # BLD AUTO: 0.85 K/UL — SIGNIFICANT CHANGE UP (ref 0–0.9)
MONOCYTES NFR BLD AUTO: 9.8 % — SIGNIFICANT CHANGE UP (ref 2–14)
NEUTROPHILS # BLD AUTO: 6.12 K/UL — SIGNIFICANT CHANGE UP (ref 1.8–7.4)
NEUTROPHILS NFR BLD AUTO: 70.1 % — SIGNIFICANT CHANGE UP (ref 43–77)
NRBC # BLD: 0 /100 WBCS — SIGNIFICANT CHANGE UP (ref 0–0)
NT-PROBNP SERPL-SCNC: 671 PG/ML — HIGH (ref 0–300)
PLATELET # BLD AUTO: 268 K/UL — SIGNIFICANT CHANGE UP (ref 150–400)
POTASSIUM SERPL-MCNC: 3.6 MMOL/L — SIGNIFICANT CHANGE UP (ref 3.5–5.3)
POTASSIUM SERPL-SCNC: 3.6 MMOL/L — SIGNIFICANT CHANGE UP (ref 3.5–5.3)
PROT SERPL-MCNC: 7.5 G/DL — SIGNIFICANT CHANGE UP (ref 6–8.3)
PROTHROM AB SERPL-ACNC: 16.7 SEC — HIGH (ref 10.5–13.4)
RBC # BLD: 3.83 M/UL — SIGNIFICANT CHANGE UP (ref 3.8–5.2)
RBC # FLD: 15.6 % — HIGH (ref 10.3–14.5)
SARS-COV-2 RNA SPEC QL NAA+PROBE: NEGATIVE — SIGNIFICANT CHANGE UP
SODIUM SERPL-SCNC: 143 MMOL/L — SIGNIFICANT CHANGE UP (ref 135–145)
TROPONIN T SERPL-MCNC: 0.01 NG/ML — SIGNIFICANT CHANGE UP (ref 0–0.01)
WBC # BLD: 8.71 K/UL — SIGNIFICANT CHANGE UP (ref 3.8–10.5)
WBC # FLD AUTO: 8.71 K/UL — SIGNIFICANT CHANGE UP (ref 3.8–10.5)

## 2022-06-11 PROCEDURE — 99285 EMERGENCY DEPT VISIT HI MDM: CPT

## 2022-06-11 PROCEDURE — 93010 ELECTROCARDIOGRAM REPORT: CPT

## 2022-06-11 PROCEDURE — 70450 CT HEAD/BRAIN W/O DYE: CPT | Mod: 26,MA

## 2022-06-11 PROCEDURE — 99223 1ST HOSP IP/OBS HIGH 75: CPT | Mod: GC

## 2022-06-11 PROCEDURE — 71045 X-RAY EXAM CHEST 1 VIEW: CPT | Mod: 26

## 2022-06-11 RX ORDER — AMIODARONE HYDROCHLORIDE 400 MG/1
200 TABLET ORAL EVERY 24 HOURS
Refills: 0 | Status: DISCONTINUED | OUTPATIENT
Start: 2022-06-12 | End: 2022-06-13

## 2022-06-11 RX ORDER — LEVOTHYROXINE SODIUM 125 MCG
25 TABLET ORAL EVERY 24 HOURS
Refills: 0 | Status: DISCONTINUED | OUTPATIENT
Start: 2022-06-12 | End: 2022-06-13

## 2022-06-11 RX ORDER — ACETAMINOPHEN 500 MG
650 TABLET ORAL EVERY 6 HOURS
Refills: 0 | Status: DISCONTINUED | OUTPATIENT
Start: 2022-06-11 | End: 2022-06-13

## 2022-06-11 RX ORDER — APIXABAN 2.5 MG/1
2.5 TABLET, FILM COATED ORAL EVERY 12 HOURS
Refills: 0 | Status: DISCONTINUED | OUTPATIENT
Start: 2022-06-11 | End: 2022-06-13

## 2022-06-11 RX ORDER — ATORVASTATIN CALCIUM 80 MG/1
40 TABLET, FILM COATED ORAL AT BEDTIME
Refills: 0 | Status: DISCONTINUED | OUTPATIENT
Start: 2022-06-11 | End: 2022-06-13

## 2022-06-11 RX ORDER — LANOLIN ALCOHOL/MO/W.PET/CERES
3 CREAM (GRAM) TOPICAL AT BEDTIME
Refills: 0 | Status: DISCONTINUED | OUTPATIENT
Start: 2022-06-11 | End: 2022-06-13

## 2022-06-11 NOTE — ED PROVIDER NOTE - OBJECTIVE STATEMENT
former smoker, with hx of HTN, HLD, Multiple, Myeloma, Breast CA s/p right lumpectomy, rectal CA s/p radiation treatment, atrial fibrillation s/p DCC 5/2021 (on eliquis), chronic dCHF, and severe MR who presented to outpatient office for evaluation of her valve disease.  She reports feeling fatigued and having FORBES, NYHA class III symptoms w complaints of gait unsteadiness 89 yo former smoker, with hx of HTN, HLD, Multiple, Myeloma, Breast CA s/p right lumpectomy, rectal CA s/p radiation treatment, atrial fibrillation s/p DCC 5/2021 (on eliquis), chronic dCHF, and severe MR who presented to outpatient office for evaluation of her valve disease.  She reports feeling fatigued and having FORBES, NYHA class III symptoms w complaints of gait unsteadiness worsening this morning. Pt stated ongoing for two weeks, two weeks ago had episode of flashes but did not follow up with optho. Had episode today at 10a again where had to be helped to bed by sig other and could not walk on own. no chest pain, sob, pedal edema, cough, f/c. 89 yo former smoker, with hx of HTN, HLD, Multiple, Myeloma, Breast CA s/p right lumpectomy, rectal CA s/p radiation treatment, atrial fibrillation s/p DCC 5/2021 (on eliquis), chronic dCHF, and severe MR  w complaints of gait unsteadiness worsening this morning. Pt stated ongoing for two weeks, two weeks ago had episode of flashes but did not follow up with optho. Had episode of weakness today at 10a again where had to be helped to bed by sig other and could not walk on own. no chest pain, sob, pedal edema, cough, f/c.

## 2022-06-11 NOTE — H&P ADULT - PROBLEM SELECTOR PLAN 5
Follows with Dr. Benito Giron, taking Velicaid weekly and an infusion every 6 weeks for bone health (bisphosphonate?)  - f/u outpatient Follows with Dr. Benito Giron, taking Velicaid weekly and an infusion every 6 weeks for bone health (bisphosphonate?)  - c/w velicaid, letrozole, infusion as outpatient  - f/u outpatient

## 2022-06-11 NOTE — H&P ADULT - NSHPSOCIALHISTORY_GEN_ALL_CORE
Lives alone intermittently with partner  Former smoker quit decades ago  Drinks wine spritzers daily, last time 3+ drinks on birthday  no drug use

## 2022-06-11 NOTE — REASON FOR VISIT
[Arrhythmia/ECG Abnorrmalities] : arrhythmia/ECG abnormalities [Structural Heart and Valve Disease] : structural heart and valve disease [Hyperlipidemia] : hyperlipidemia [Hypertension] : hypertension [FreeTextEntry1] : pt had flashing in left eye could not focus felt uncomfortable and felt dizzy pt now feels quite weak

## 2022-06-11 NOTE — CONSULT NOTE ADULT - SUBJECTIVE AND OBJECTIVE BOX
**STROKE CODE CONSULT NOTE**    HPI: Patient is a 89 year old female with HFpEF with sever MR s/p clip, a fib s/p DCC on Eliquis, HTN, breast and rectal cancer in remission, active MM presented to [facility] for imbalance. Patient reports for the past two weeks, she has been experiencing recurrent episodes of imbalance that last anywhere from a few minutes to a few hours. Reports no associated weakness, numbness, speech disturbance, visual disturbance, vertigo, chest pain. Upon arrival to [facility], NIHHS 0. Patient with narrow steady gait unassisted. HCT with acute ischemia or bleed.     T(C): 36.4 (06-11-22 @ 18:07), Max: 36.6 (06-11-22 @ 16:11)  HR: 62 (06-11-22 @ 19:32) (58 - 74)  BP: 144/67 (06-11-22 @ 19:32) (122/65 - 151/65)  RR: 16 (06-11-22 @ 19:32) (16 - 18)  SpO2: 100% (06-11-22 @ 19:32) (97% - 100%)    PAST MEDICAL & SURGICAL HISTORY:  Atrial fibrillation  on apixaban  Malignant neoplasm of anus  s/p radiation  Malignant neoplasm of female breast  s/p radiation, lumpectomy 2013  Multiple myeloma  No significant past surgical history    ROS:   Constitutional: No fever, weight loss or fatigue  Eyes: No eye pain, or discharge  ENMT:   No sinus or throat pain  Neck: No pain or stiffness  Respiratory: No cough, wheezing, chills or hemoptysis  Cardiovascular: No chest pain, palpitations, shortness of breath, or leg swelling  Gastrointestinal: No diarrhea or constipation. Nohematochezia.  Genitourinary: No dysuria, frequency, hematuria or incontinence  Neurological: As per HPI  Skin: No itching, burning, rashes or lesions   Musculoskeletal:  No muscle, back or extremity pain    MEDICATIONS  (PRN):  acetaminophen     Tablet .. 650 milliGRAM(s) Oral every 6 hours PRN Temp greater or equal to 38C (100.4F), Mild Pain (1 - 3)  melatonin 3 milliGRAM(s) Oral at bedtime PRN Insomnia    Allergies  No Known Allergies  Intolerances    Vital Signs Last 24 Hrs  T(C): 36.4 (11 Jun 2022 18:07), Max: 36.6 (11 Jun 2022 16:11)  T(F): 97.5 (11 Jun 2022 18:07), Max: 97.9 (11 Jun 2022 16:45)  HR: 62 (11 Jun 2022 19:32) (58 - 74)  BP: 144/67 (11 Jun 2022 19:32) (122/65 - 151/65)  RR: 16 (11 Jun 2022 19:32) (16 - 18)  SpO2: 100% (11 Jun 2022 19:32) (97% - 100%)    Physical exam:  Constitutional: No acute distress, conversant    Neurologic:  -Mental status: Awake, alert, oriented to person, place, and time. Speech is fluent with intact naming, repetition, and comprehension, no dysarthria. Recent and remote memory intact. Follows commands. Attention/concentration intact. Fund of knowledge appropriate.  -Cranial nerves:   II: Visual fields are full to finger counting  III, IV, VI: Extraocular movements are intact without nystagmus. Pupils equally round and reactive to light  V:  Facial sensation V1-V3 equal and intact   VII: Face is symmetric with normal eye closure and smile  IX, X: Uvula is midline and soft palate rises symmetrically  XI: Head turning and shoulder shrug are intact.  XII: Tongue protrudes midline  Motor: Normal bulk and tone. No pronator drift. Strength bilateral upper extremity 5/5, bilateral lower extremities 5/5.  Sensation: Intact to light touch bilaterally. No neglect or extinction on double simultaneous testing.  Coordination: No dysmetria on finger-to-nose bilaterally  Gait: Narrow gait and steady    NIHSS: 0    POCT Blood Glucose.: 112 mg/dL (11 Jun 2022 16:13)    LABS:                        10.8   8.71  )-----------( 268      ( 11 Jun 2022 16:47 )             34.3     143  |  103  |  27<H>  ----------------------------<  122<H>  3.6   |  28  |  1.58<H>    Ca    8.8      11 Jun 2022 16:47    TPro  7.5  /  Alb  3.8  /  TBili  0.3  /  DBili  x   /  AST  20  /  ALT  15  /  AlkPhos  142<H>  06-11    PT/INR - ( 11 Jun 2022 16:47 )   PT: 16.7 sec;   INR: 1.40       PTT - ( 11 Jun 2022 16:47 )  PTT:32.0 sec  CARDIAC MARKERS ( 11 Jun 2022 16:47 )  x     / 0.01 ng/mL / x     / x     / x          RADIOLOGY & ADDITIONAL STUDIES:  < from: CT Brain Stroke Protocol (06.11.22 @ 16:29) >  No acute intracranial hemorrhage, mass effect or CT evidence of recent   transcortical infarct. No significant change compared to August 2020.    -----------------------------------------------------------------------------------------------------------------  IV-Alteplase (Y/N):  No  Reason IV-tPA not given: No disabling neurologic deficits     **STROKE CODE CONSULT NOTE**    HPI: Patient is a 89 year old female with HFpEF with sever MR s/p clip, a fib s/p DCC on Eliquis, HTN, breast and rectal cancer in remission, active MM presented to [facility] for imbalance. Patient reports for the past two weeks, she has been experiencing recurrent episodes of imbalance that last anywhere from a few minutes to a few hours. Reports no associated weakness, numbness, speech disturbance, visual disturbance, vertigo, chest pain. Upon arrival to [facility], NIHHS 0. Patient with narrow steady gait unassisted. HCT without acute ischemia or bleed.     T(C): 36.4 (06-11-22 @ 18:07), Max: 36.6 (06-11-22 @ 16:11)  HR: 62 (06-11-22 @ 19:32) (58 - 74)  BP: 144/67 (06-11-22 @ 19:32) (122/65 - 151/65)  RR: 16 (06-11-22 @ 19:32) (16 - 18)  SpO2: 100% (06-11-22 @ 19:32) (97% - 100%)    PAST MEDICAL & SURGICAL HISTORY:  Atrial fibrillation  on apixaban  Malignant neoplasm of anus  s/p radiation  Malignant neoplasm of female breast  s/p radiation, lumpectomy 2013  Multiple myeloma  No significant past surgical history    ROS:   Constitutional: No fever, weight loss or fatigue  Eyes: No eye pain, or discharge  ENMT:   No sinus or throat pain  Neck: No pain or stiffness  Respiratory: No cough, wheezing, chills or hemoptysis  Cardiovascular: No chest pain, palpitations, shortness of breath, or leg swelling  Gastrointestinal: No diarrhea or constipation. Nohematochezia.  Genitourinary: No dysuria, frequency, hematuria or incontinence  Neurological: As per HPI  Skin: No itching, burning, rashes or lesions   Musculoskeletal:  No muscle, back or extremity pain    MEDICATIONS  (PRN):  acetaminophen     Tablet .. 650 milliGRAM(s) Oral every 6 hours PRN Temp greater or equal to 38C (100.4F), Mild Pain (1 - 3)  melatonin 3 milliGRAM(s) Oral at bedtime PRN Insomnia    Allergies  No Known Allergies  Intolerances    Vital Signs Last 24 Hrs  T(C): 36.4 (11 Jun 2022 18:07), Max: 36.6 (11 Jun 2022 16:11)  T(F): 97.5 (11 Jun 2022 18:07), Max: 97.9 (11 Jun 2022 16:45)  HR: 62 (11 Jun 2022 19:32) (58 - 74)  BP: 144/67 (11 Jun 2022 19:32) (122/65 - 151/65)  RR: 16 (11 Jun 2022 19:32) (16 - 18)  SpO2: 100% (11 Jun 2022 19:32) (97% - 100%)    Physical exam:  Constitutional: No acute distress, conversant    Neurologic:  -Mental status: Awake, alert, oriented to person, place, and time. Speech is fluent with intact naming, repetition, and comprehension, no dysarthria. Recent and remote memory intact. Follows commands. Attention/concentration intact. Fund of knowledge appropriate.  -Cranial nerves:   II: Visual fields are full to finger counting  III, IV, VI: Extraocular movements are intact without nystagmus. Pupils equally round and reactive to light  V:  Facial sensation V1-V3 equal and intact   VII: Face is symmetric with normal eye closure and smile  IX, X: Uvula is midline and soft palate rises symmetrically  XI: Head turning and shoulder shrug are intact.  XII: Tongue protrudes midline  Motor: Normal bulk and tone. No pronator drift. Strength bilateral upper extremity 5/5, bilateral lower extremities 5/5.  Sensation: Intact to light touch bilaterally. No neglect or extinction on double simultaneous testing.  Coordination: No dysmetria on finger-to-nose bilaterally  Gait: Narrow gait and steady    NIHSS: 0    POCT Blood Glucose.: 112 mg/dL (11 Jun 2022 16:13)    LABS:                        10.8   8.71  )-----------( 268      ( 11 Jun 2022 16:47 )             34.3     143  |  103  |  27<H>  ----------------------------<  122<H>  3.6   |  28  |  1.58<H>    Ca    8.8      11 Jun 2022 16:47    TPro  7.5  /  Alb  3.8  /  TBili  0.3  /  DBili  x   /  AST  20  /  ALT  15  /  AlkPhos  142<H>  06-11    PT/INR - ( 11 Jun 2022 16:47 )   PT: 16.7 sec;   INR: 1.40       PTT - ( 11 Jun 2022 16:47 )  PTT:32.0 sec  CARDIAC MARKERS ( 11 Jun 2022 16:47 )  x     / 0.01 ng/mL / x     / x     / x          RADIOLOGY & ADDITIONAL STUDIES:  < from: CT Brain Stroke Protocol (06.11.22 @ 16:29) >  No acute intracranial hemorrhage, mass effect or CT evidence of recent   transcortical infarct. No significant change compared to August 2020.    -----------------------------------------------------------------------------------------------------------------  IV-Alteplase (Y/N):  No  Reason IV-tPA not given: No disabling neurologic deficits

## 2022-06-11 NOTE — ED ADULT NURSE NOTE - NSIMPLEMENTINTERV_GEN_ALL_ED
Implemented All Fall with Harm Risk Interventions:  [place] to call system. Call bell, personal items and telephone within reach. Instruct patient to call for assistance. Room bathroom lighting operational. Non-slip footwear when patient is off stretcher. Physically safe environment: no spills, clutter or unnecessary equipment. Stretcher in lowest position, wheels locked, appropriate side rails in place. Provide visual cue, wrist band, yellow gown, etc. Monitor gait and stability. Monitor for mental status changes and reorient to person, place, and time. Review medications for side effects contributing to fall risk. Reinforce activity limits and safety measures with patient and family. Provide visual clues: red socks.

## 2022-06-11 NOTE — HISTORY OF PRESENT ILLNESS
[FreeTextEntry1] : Patient is a 89-year-old woman with multiple myeloma has been under treatment and has not progressed patient has had severe mitral regurgitation treated with a mitral valve clip and patient is still symptomatic with shortness of breath and ankle edema patient has history of atrial fibrillation and patient is on a NOAC but due to age weight and kidney function 2.5 mg of Eliquis twice a day patient today presents after a disturbing symptom of flashing sharp like pieces in her left thigh which resolved but left her feeling dizzy and shaky the episode itself lasted 2 minutes

## 2022-06-11 NOTE — H&P ADULT - NSHPLABSRESULTS_GEN_ALL_CORE
LABS:                         10.8   8.71  )-----------( 268      ( 11 Jun 2022 16:47 )             34.3     06-11    143  |  103  |  27<H>  ----------------------------<  122<H>  3.6   |  28  |  1.58<H>    Ca    8.8      11 Jun 2022 16:47    TPro  7.5  /  Alb  3.8  /  TBili  0.3  /  DBili  x   /  AST  20  /  ALT  15  /  AlkPhos  142<H>  06-11    PT/INR - ( 11 Jun 2022 16:47 )   PT: 16.7 sec;   INR: 1.40          PTT - ( 11 Jun 2022 16:47 )  PTT:32.0 sec    CARDIAC MARKERS ( 11 Jun 2022 16:47 )  x     / 0.01 ng/mL / x     / x     / x                RADIOLOGY, EKG & ADDITIONAL TESTS: Reviewed.

## 2022-06-11 NOTE — ED ADULT NURSE NOTE - OBJECTIVE STATEMENT
Patient w/ Hx of Afib on Eliquis, Multiple Myeloma and breast CA on remission. Patient states had been having episodes of "could not function and feeling unstable on feet " intermittent X 2 weeks, also episodes of seeing flashes of light lasting less than 1 minute.  States today at 10:15am felt unsteady on her feet, feels like falling with some dizziness, no chest pain or SOB.  No facial asymmetry, slurred speech, weakness, arm drift or other neuro deficits.  Immediate upgrade to Dr. Peraza; Code Stroke activated.  Patient w/ Hx of Afib on Eliquis, Multiple Myeloma and breast CA on remission.

## 2022-06-11 NOTE — ED ADULT NURSE REASSESSMENT NOTE - NS ED NURSE REASSESS COMMENT FT1
Patient a/oX3, c/o of still feeling unsteady on her feet, feels like falling with some dizziness, no chest pain or SOB, no other neuro deficits.  EKG pending; vital signs stable.  Left AC PIV #18 in place, all labs sent, no complications.  s/p Code Stroke acitvation, CT scan done.  NIH scale score 0, GCS 15, TIMUR, Dysphagia screen passed.  Evaluated by Neurology.  AS per Dr. Peraza Neuro checks every 4 hours.  Fall precaution observed.  Stable and comfortable.

## 2022-06-11 NOTE — H&P ADULT - HISTORY OF PRESENT ILLNESS
89 F with PMHx HFpEF with sever MR s/p clip, a fib s/p DCC, HTN, breast and rectal cancer in remission, active MM presented to [facility] for recurrent episodes of weakness and loss of balance. She has been getting these episodes that typically last about a minute but take an hour or two to recover for the past 1-2 years. They typically come once a month; however, the patient's partner Kike describes that most days she has a low grade lightheadedness spell in the morning after breakfast. Her symptoms are worst when she stands quickly, and there is no accompanying vertigo, palpitations, tunnel vision, chest pain, or dyspnea. She reports having a near fall about once a month, but has had 2 episodes in the past week: on 6/9/22 when she also had floaters in her L visual field for 2 minutes as well as unsteadiness and on day of admission when she felt weak and unsteady.  Has been following closely with Dr. Dhara NG on torsemide 20. She was taking double doses 5/31-6/5 which helped with her edema and did not trigger a lightheadedness episode -- resumed normal dosing 3 days before her 6/9 episode.    ED Course: 36.6, 74, 122/65, 18, 98%  CXR: rotated, mitral clip  EKG: NSR  Labs b/l anemia 10.8, INR 1.4, Cr 1.58 from 1.22 12/2020  No interventions given

## 2022-06-11 NOTE — CONSULT NOTE ADULT - ASSESSMENT
Patient is a 89 year old female with HFpEF with sever MR s/p clip, a fib s/p DCC on Eliquis, HTN, breast and rectal cancer in remission, active MM presented to [facility] for imbalance x 2 weeks. Upon arrival to [facility], NIHSS 0, HCT without acute intracranial pathology. Patient with narrow steady gait unassisted. Low suspicion for stroke as etiology of imbalance - would expect to see ischemic infarct on head CT after two weeks of symptoms.     - Recommend performing orthostatic vital signs  - Would recommend Medicine consult/admission for safety evaluation and PT/OT  - Can obtain MR brain to confirm no stroke if admitted    Case discussed with Dr. Nisha Burgos

## 2022-06-11 NOTE — H&P ADULT - PROBLEM SELECTOR PLAN 6
F: none  E: replete K<4.0, Mg<2.0, Phos<2.5  N: Full  DVT prophylaxis: already on eliquis  Access: PIV  Code Status: full (confirmed 6/11)

## 2022-06-11 NOTE — ED ADULT NURSE REASSESSMENT NOTE - NS ED NURSE REASSESS COMMENT FT1
Patient a/oX3, Patient denies any dizziness, states just feels "unstable" when standing and walking, feels will fall but denies any dizziness even when ambulating.  Re-evaluated by Dr. Peraza and for admit due to continuing unsteady gait feeling.  Afib on cardiac monitor, vital signs stable.

## 2022-06-11 NOTE — H&P ADULT - PROBLEM SELECTOR PLAN 3
Admission Cr 1.58; her baseline has fluctuated for the past year but in 2021 she was 1.22 so this may be a mild DAVID secondary to torsemide vs progression of CKD. Follows with Dr. Segovia.  - Given heart failure and edema, observe off IVF  - urine electrolytes  - trend Cr

## 2022-06-11 NOTE — PHYSICAL EXAM
[Well Developed] : well developed [Well Nourished] : well nourished [No Acute Distress] : no acute distress [Normal Conjunctiva] : normal conjunctiva [Normal Venous Pressure] : normal venous pressure [No Carotid Bruit] : no carotid bruit [Normal S1, S2] : normal S1, S2 [No Rub] : no rub [No Murmur] : no murmur [No Gallop] : no gallop [Clear Lung Fields] : clear lung fields [Good Air Entry] : good air entry [No Respiratory Distress] : no respiratory distress  [Soft] : abdomen soft [Non Tender] : non-tender [No Masses/organomegaly] : no masses/organomegaly [Normal Bowel Sounds] : normal bowel sounds [Normal Gait] : normal gait [No Cyanosis] : no cyanosis [No Clubbing] : no clubbing [No Varicosities] : no varicosities [Edema ___] : edema [unfilled] [No Rash] : no rash [No Skin Lesions] : no skin lesions [Moves all extremities] : moves all extremities [No Focal Deficits] : no focal deficits [Normal Speech] : normal speech [Alert and Oriented] : alert and oriented [Normal memory] : normal memory

## 2022-06-11 NOTE — H&P ADULT - PROBLEM SELECTOR PLAN 2
Longstanding Hx with recent unchanged TTE July 2021 in [place] with another manually input 8/10/21 TTE showing severe MR. Her MR may be contributing to her inability to mount an appropriate alpha agonist response to change in position.  - diuretic/BB as above  - c/w atorvastatin 40  - f/u with Dr. Jules Longstanding Hx with recent unchanged TTE July 2021 in [place] with another manually input 8/10/21 TTE showing severe MR. Her MR may be contributing to her inability to mount an appropriate alpha agonist response to change in position.  - diuretic/BB as above  - c/w atorvastatin 40  - limited TTE  - f/u with Dr. Jules Longstanding Hx with recent unchanged TTE July 2021 in [place] with another manually input 8/10/21 TTE showing severe MR. Her MR may be contributing to her inability to mount an appropriate alpha agonist response to change in position.  - diuretic/BB as above  - pro-BNP  - c/w atorvastatin 40  - limited TTE  - f/u with Dr. Jules

## 2022-06-11 NOTE — ED PROVIDER NOTE - PHYSICAL EXAMINATION

## 2022-06-11 NOTE — H&P ADULT - NSHPREVIEWOFSYSTEMS_GEN_ALL_CORE
REVIEW OF SYSTEMS:  CONSTITUTIONAL: + weakness, no fevers, chills, changes in weight  EYES/ENT: No visual changes;  No vertigo or throat pain   NECK: No pain or stiffness  RESPIRATORY: No cough, wheezing, hemoptysis; No shortness of breath  CARDIOVASCULAR: no chest pain or palpitations  GASTROINTESTINAL: No abdominal or epigastric pain. No nausea, vomiting, or hematemesis; No diarrhea or constipation. No melena or hematochezia.  GENITOURINARY: No dysuria, frequency or hematuria  NEUROLOGICAL: No numbness or weakness  SKIN: No itching, rashes

## 2022-06-11 NOTE — ED PROVIDER NOTE - CLINICAL SUMMARY MEDICAL DECISION MAKING FREE TEXT BOX
gait unsteadiness/weakness gait unsteadiness/weakness worsening over the last two weeks, seen by stroke given ?new episode at 10a this morning, disc w stroke, less likely acute CVA, will admit given age, fall risk for further eval/mgt.

## 2022-06-11 NOTE — H&P ADULT - NSHPPHYSICALEXAM_GEN_ALL_CORE
VITAL SIGNS:  T(C): 36.4 (06-11-22 @ 18:07), Max: 36.6 (06-11-22 @ 16:11)  T(F): 97.5 (06-11-22 @ 18:07), Max: 97.9 (06-11-22 @ 16:45)  HR: 62 (06-11-22 @ 19:32) (58 - 74)  BP: 144/67 (06-11-22 @ 19:32) (122/65 - 151/65)  BP(mean): --  RR: 16 (06-11-22 @ 19:32) (16 - 18)  SpO2: 100% (06-11-22 @ 19:32) (97% - 100%)  Wt(kg): --    PHYSICAL EXAM:  Constitutional: thin elderly woman resting comfortably in bed; NAD  Head: NC  Eyes: PER, EOMI, anicteric sclera  ENT: no nasal discharge; uvula midline, no oropharyngeal erythema or exudates; MMM  Neck: supple; no JVD  Respiratory: CTA B/L; no W/R/R, no retractions; lung sounds distant given habitus  Cardiac: +S1/S2; RRR; no M/R/G  Gastrointestinal: abdomen soft, NT/ND; no rebound or guarding  Extremities: bilateral 1+ edema behind ankles, symmetric, some venous stasis dermatitis on b/l anterior shins  Vascular: 2+ radial, DP pulses B/L  Dermatologic: skin warm, dry and intact; no rashes, wounds, or scars  Neurologic: AAOx3; CNII-XII grossly intact; no focal deficits; moderately hearing impaired; difficulty with word finding and descriptions without aid of partner  Psychiatric: affect and characteristics of appearance, verbalizations, behaviors are appropriate    Orthostatics done by 4U RN with  lying, 168 sitting, 130 standing

## 2022-06-11 NOTE — H&P ADULT - PROBLEM SELECTOR PLAN 1
Confirmed on admission with SBP drop from 168 sitting to 130 standing. This correlates with her symptom onset immediately upon standing and worst in the morning before she has taken PO. The etiology is likely iatrogenic hypovolemia with daily torsemide and last week doubling up on the torsemide. Her metoprolol may be contributing; not taking other alpha inhibitors. It is possible that there is also a component of musculoskeletal weakness worsened by myeloma treatment, but that is likely secondary.  - hold torsemide  - encourage PO intake  - bid orthostatic vitals  - PT consultation  - c/w metoprolol for now, discuss with cardiology if persistently orthostatic Confirmed on admission with SBP drop from 168 sitting to 130 standing with diastolic 69->58. This correlates with her symptom onset immediately upon standing and worst in the morning before she has taken PO. The etiology is likely iatrogenic hypovolemia with daily torsemide and last week doubling up on the torsemide. Her metoprolol may be contributing; not taking other alpha inhibitors. It is possible that there is also a component of musculoskeletal weakness worsened by myeloma treatment, but that is likely secondary.  - hold torsemide  - encourage PO intake  - bid orthostatic vitals  - PT consultation  - c/w metoprolol for now, discuss with cardiology if persistently orthostatic

## 2022-06-11 NOTE — ASSESSMENT
[FreeTextEntry1] : Patient with possible TIA that lasted 2 minutes patient will be reevaluated and needed by neurology patient is on Eliquis 2.5 twice a day and has a history of following the questions are should we add a baby aspirin or increase her Eliquis and what was this event discussion with Dr. Canas brought up that this could have been an aura of a migraine and not a TIA patient will need further evaluation a consult with neurology was arranged

## 2022-06-11 NOTE — REVIEW OF SYSTEMS
[Feeling Fatigued] : feeling fatigued [Dyspnea on exertion] : dyspnea during exertion [Lower Ext Edema] : lower extremity edema [Dizziness] : dizziness [Negative] : ENT [de-identified] : visual disturbance transient

## 2022-06-11 NOTE — ED ADULT TRIAGE NOTE - CHIEF COMPLAINT QUOTE
" I had a flashes in my left eye yesterday and this morning I was frozen where I couldn't move around 10AM and I still have a hard time walking and I feel weak"

## 2022-06-11 NOTE — H&P ADULT - ATTENDING COMMENTS
#Orthostatic hypotension: p/w unsteadiness, worse when standing, in setting of recently increased diuretic. Stroke w/up negative. EKG non ischemic, nsr, +orthostatics. Currently euvolemic, hold torsemide for milady. Repeat orthostatic in AM. Cards- dr kwok in AM

## 2022-06-12 ENCOUNTER — TRANSCRIPTION ENCOUNTER (OUTPATIENT)
Age: 87
End: 2022-06-12

## 2022-06-12 LAB
ALBUMIN SERPL ELPH-MCNC: 3.6 G/DL — SIGNIFICANT CHANGE UP (ref 3.3–5)
ALP SERPL-CCNC: 139 U/L — HIGH (ref 40–120)
ALT FLD-CCNC: 14 U/L — SIGNIFICANT CHANGE UP (ref 10–45)
ANION GAP SERPL CALC-SCNC: 9 MMOL/L — SIGNIFICANT CHANGE UP (ref 5–17)
APPEARANCE UR: CLEAR — SIGNIFICANT CHANGE UP
AST SERPL-CCNC: 21 U/L — SIGNIFICANT CHANGE UP (ref 10–40)
BILIRUB SERPL-MCNC: 0.6 MG/DL — SIGNIFICANT CHANGE UP (ref 0.2–1.2)
BILIRUB UR-MCNC: NEGATIVE — SIGNIFICANT CHANGE UP
BUN SERPL-MCNC: 22 MG/DL — SIGNIFICANT CHANGE UP (ref 7–23)
CALCIUM SERPL-MCNC: 9 MG/DL — SIGNIFICANT CHANGE UP (ref 8.4–10.5)
CHLORIDE SERPL-SCNC: 104 MMOL/L — SIGNIFICANT CHANGE UP (ref 96–108)
CO2 SERPL-SCNC: 28 MMOL/L — SIGNIFICANT CHANGE UP (ref 22–31)
COLOR SPEC: YELLOW — SIGNIFICANT CHANGE UP
CREAT ?TM UR-MCNC: 29 MG/DL — SIGNIFICANT CHANGE UP
CREAT SERPL-MCNC: 1.43 MG/DL — HIGH (ref 0.5–1.3)
DIFF PNL FLD: NEGATIVE — SIGNIFICANT CHANGE UP
EGFR: 35 ML/MIN/1.73M2 — LOW
GLUCOSE SERPL-MCNC: 95 MG/DL — SIGNIFICANT CHANGE UP (ref 70–99)
GLUCOSE UR QL: NEGATIVE — SIGNIFICANT CHANGE UP
HCT VFR BLD CALC: 34.4 % — LOW (ref 34.5–45)
HGB BLD-MCNC: 10.8 G/DL — LOW (ref 11.5–15.5)
KETONES UR-MCNC: NEGATIVE — SIGNIFICANT CHANGE UP
LEUKOCYTE ESTERASE UR-ACNC: NEGATIVE — SIGNIFICANT CHANGE UP
MAGNESIUM SERPL-MCNC: 2.3 MG/DL — SIGNIFICANT CHANGE UP (ref 1.6–2.6)
MCHC RBC-ENTMCNC: 27.6 PG — SIGNIFICANT CHANGE UP (ref 27–34)
MCHC RBC-ENTMCNC: 31.4 GM/DL — LOW (ref 32–36)
MCV RBC AUTO: 88 FL — SIGNIFICANT CHANGE UP (ref 80–100)
NITRITE UR-MCNC: NEGATIVE — SIGNIFICANT CHANGE UP
NRBC # BLD: 0 /100 WBCS — SIGNIFICANT CHANGE UP (ref 0–0)
PH UR: 6.5 — SIGNIFICANT CHANGE UP (ref 5–8)
PHOSPHATE SERPL-MCNC: 3.5 MG/DL — SIGNIFICANT CHANGE UP (ref 2.5–4.5)
PLATELET # BLD AUTO: 241 K/UL — SIGNIFICANT CHANGE UP (ref 150–400)
POTASSIUM SERPL-MCNC: 4.2 MMOL/L — SIGNIFICANT CHANGE UP (ref 3.5–5.3)
POTASSIUM SERPL-SCNC: 4.2 MMOL/L — SIGNIFICANT CHANGE UP (ref 3.5–5.3)
PROT SERPL-MCNC: 7.4 G/DL — SIGNIFICANT CHANGE UP (ref 6–8.3)
PROT UR-MCNC: NEGATIVE MG/DL — SIGNIFICANT CHANGE UP
RBC # BLD: 3.91 M/UL — SIGNIFICANT CHANGE UP (ref 3.8–5.2)
RBC # FLD: 15.6 % — HIGH (ref 10.3–14.5)
SODIUM SERPL-SCNC: 141 MMOL/L — SIGNIFICANT CHANGE UP (ref 135–145)
SODIUM UR-SCNC: 95 MMOL/L — SIGNIFICANT CHANGE UP
SP GR SPEC: 1.01 — SIGNIFICANT CHANGE UP (ref 1–1.03)
UROBILINOGEN FLD QL: 0.2 E.U./DL — SIGNIFICANT CHANGE UP
UUN UR-MCNC: 279 MG/DL — SIGNIFICANT CHANGE UP
WBC # BLD: 8.51 K/UL — SIGNIFICANT CHANGE UP (ref 3.8–10.5)
WBC # FLD AUTO: 8.51 K/UL — SIGNIFICANT CHANGE UP (ref 3.8–10.5)

## 2022-06-12 PROCEDURE — 99233 SBSQ HOSP IP/OBS HIGH 50: CPT

## 2022-06-12 PROCEDURE — 70553 MRI BRAIN STEM W/O & W/DYE: CPT | Mod: 26

## 2022-06-12 RX ORDER — SODIUM CHLORIDE 9 MG/ML
500 INJECTION INTRAMUSCULAR; INTRAVENOUS; SUBCUTANEOUS ONCE
Refills: 0 | Status: COMPLETED | OUTPATIENT
Start: 2022-06-12 | End: 2022-06-12

## 2022-06-12 RX ADMIN — SODIUM CHLORIDE 500 MILLILITER(S): 9 INJECTION INTRAMUSCULAR; INTRAVENOUS; SUBCUTANEOUS at 19:05

## 2022-06-12 RX ADMIN — APIXABAN 2.5 MILLIGRAM(S): 2.5 TABLET, FILM COATED ORAL at 10:14

## 2022-06-12 RX ADMIN — APIXABAN 2.5 MILLIGRAM(S): 2.5 TABLET, FILM COATED ORAL at 21:42

## 2022-06-12 RX ADMIN — AMIODARONE HYDROCHLORIDE 200 MILLIGRAM(S): 400 TABLET ORAL at 06:40

## 2022-06-12 RX ADMIN — Medication 25 MICROGRAM(S): at 06:40

## 2022-06-12 RX ADMIN — ATORVASTATIN CALCIUM 40 MILLIGRAM(S): 80 TABLET, FILM COATED ORAL at 21:41

## 2022-06-12 RX ADMIN — APIXABAN 2.5 MILLIGRAM(S): 2.5 TABLET, FILM COATED ORAL at 00:16

## 2022-06-12 NOTE — PATIENT PROFILE ADULT - NSPROPTRIGHTCAREGIVER_GEN_A_NUR
History of Present Illness  Care Coordination Encounter Information:   Type of Encounter: Telephonic   Contact: Initial Contact    Spoke to Patient   Hilda Hernandez  Care Coordination  Nurse [facility]: I reached out to Hilda Hernandez in order to est CC  He agreed to have me reach out again in a few weeks to discuss further but was open to be involved  JG      Active Problems    1  Abnormal electrocardiogram (794 31) (R94 31)   2  Asthma (493 90) (J45 909)   3  Asthmatic bronchitis with acute exacerbation (493 92) (J45 901)   4  Benign essential hypertension (401 1) (I10)   5  Cervical radiculopathy (723 4) (M54 12)   6  Chronic arthritis (716 90) (M19 90)   7  Controlled diabetes mellitus type II without complication (650 58) (T04 3)   8  Eczema, unspecified eczema   9  Encounter for prostate cancer screening (V76 44) (Z12 5)   10  Fatigue (780 79) (R53 83)   11  Flu vaccine need (V04 81) (Z23)   12  GERD (gastroesophageal reflux disease) (530 81) (K21 9)   13  Gout (274 9) (M10 9)   14  Hip pain, right (719 45) (M25 551)   15  Hyperlipemia, mixed (272 2) (E78 2)   16  Ketoacidosis due to diabetes (250 10) (E13 10)   17  Neck pain (723 1) (M54 2)   18  Peripheral neuropathy (356 9) (G62 9)   19  Positive depression screening (796 4) (Z13 89)   20  Primary localized osteoarthritis of hips, bilateral (715 15) (M16 0)   21  Primary localized osteoarthritis of knees, bilateral (715 16) (M17 0)   22  Restless legs (333 94) (G25 81)   23  Shortness of breath (786 05) (R06 02)   24  Shoulder tendonitis (726 10) (M75 80)   25  Sleep apnea (780 57) (G47 30)    Past Medical History    1  History of Impaired fasting glucose (790 21) (R73 01)   2  History of Knee arthropathy (716 96) (M17 10)   3  History of Knee bursitis (726 60) (M70 50)   4  History of Knee bursitis, left (726 60) (M70 52)   5  History of Knee bursitis, right (726 60) (M70 51)   6  History of Left knee pain (719 46) (M29 072)   7   History of Right knee pain (719 46) (M25 579)    Surgical History    1  History of Knee Arthroscopy (Therapeutic)    Family History  Father    1  Family history of Coronary Artery Disease (V17 49)  Uncle    2  Family history of malignant neoplasm (V16 9) (Z80 9)    Social History    · Alcohol use (V49 89) (Z78 9)   · Never A Smoker    Current Meds    1  Azithromycin 250 MG Oral Tablet; TAKE 2 TABLETS ON DAY 1 THEN TAKE 1 TABLET A   DAY FOR 4 DAYS; Therapy: 57Pix7158 to (Last Rx:65Hvh5395)  Requested for: 10Jrf4154 Ordered   2  PredniSONE 20 MG Oral Tablet; 1 bid x 5 days , 1 qd x 5 days; Therapy: 05Buk3010 to (Evaluate:82Xqg1837)  Requested for: 42Xvi5148; Last   Rx:09Rwx3728 Ordered   3  ProAir  (90 Base) MCG/ACT Inhalation Aerosol Solution; INHALE 1 TO 2 PUFFS   EVERY 4 TO 6 HOURS AS NEEDED; Therapy: 59SFT0716 to (Evaluate:10Jun2017)  Requested for: 90Goj5272; Last   Rx:11Otm7823; Status: ACTIVE - Retrospective By Protocol Authorization Ordered   4  Ventolin  (90 Base) MCG/ACT Inhalation Aerosol Solution; inhale 1 to 2 puffs   every 4 to 6 hours if needed; Therapy: 16FCD9569 to (Evaluate:46Uab0313)  Requested for: 71Fay0429; Last   Rx:13Jaw8915; Status: ACTIVE - Retrospective By Protocol Authorization Ordered    5  Dulera 200-5 MCG/ACT Inhalation Aerosol; inhale 2 puffs by mouth twice a day; Therapy: 65Vqi1903 to (Evaluate:22Nov2017)  Requested for: 49Fyh9754; Last   Rx:32Jde7625; Status: ACTIVE - Retrospective By Protocol Authorization Ordered   6  HydroCHLOROthiazide 12 5 MG Oral Capsule; Take 1 capsule by mouth  daily; Therapy: 44FSX1759 to ([phone])  Requested for: 67Vzc9606; Last   Rx:94Dhk6068 Ordered   7  Lisinopril 40 MG Oral Tablet; Take 1 tablet by mouth  daily; Therapy: 55WQY0693 to (Evaluate:14Oct2017)  Requested for: 68Oik0368; Last   Rx:10Afb2787 Ordered    8  Diclofenac Sodium  MG Oral Tablet Extended Release 24 Hour; take 1 tablet by   mouth once daily;    Therapy: 06OXU7822 to Oumou Zaidi)  Requested for: 84LUA8898; Last   Rx:14Qgi5969 Ordered   9  Hydrocodone-Acetaminophen  MG Oral Tablet; 1 q 6hrs prn; Therapy: 64DWN6531 to (Last Rx:11Mhe0019) Ordered    10  MetFORMIN HCl - 500 MG Oral Tablet; take 1 tablet by mouth twice a day as directed; Therapy: 68ZIY4476 to (Evaluate:06Gpb0428)  Requested for: 62Pii8929; Last    Rx:54Pfc5689; Status: ACTIVE - Retrospective By Protocol Authorization Ordered    11  Basaglar KwikPen 100 UNIT/ML Subcutaneous Solution Pen-injector; INJECT 35 UNIT    Daily; Therapy: 99KBR0739 to (Last Rx:77Qdb4856)  Requested for: 17Aug2017; Status:    ACTIVE - Retrospective By Protocol Authorization Ordered   12  HumaLOG KwikPen 100 UNIT/ML Subcutaneous Solution Pen-injector; INJECT 7 UNIT    Daily; Therapy: 86XWX0012 to (Last Rx:52Eoj5975)  Requested for: 23Ulq8747; Status:    ACTIVE - Retrospective By Protocol Authorization Ordered    13  Allopurinol 100 MG Oral Tablet; take 2 tablets by mouth once daily; Therapy: 26CSD4308 to (Evaluate:60Pvj9586)  Requested for: 62OQI8529; Last    Rx:61Jbl6304 Ordered   14  Indomethacin 25 MG Oral Capsule; TAKE 1-2 CAPSULES 3 TIMES DAILY; Therapy: 51PGF9219 to (Evaluate:90Lvh6510)  Requested for: 58HZH5746; Last    Rx:78Lfw9221 Ordered    15  Atorvastatin Calcium 20 MG Oral Tablet; take 1 tablet by mouth once daily; Therapy: 04GXF2945 to (Evaluate:25Dlz6889)  Requested for: 60Joh6085; Last    Rx:69Vip4493; Status: ACTIVE - Retrospective By Protocol Authorization Ordered    16  Gabapentin 100 MG Oral Capsule; 1-2 TID; Therapy: 11Jsm7457 to (Last Rx:34Bgp4894)  Requested for: 46Sux4976 Ordered    17  OrthoVisc 30 MG/2ML Intra-articular Solution Prefilled Syringe; INJECT 2  ML    Intra-articular 2 ML's weekly in knee for 3 weeks; Therapy: 11Vmy4615 to (Last Rx:61Ptq1500) Ordered   18  OrthoVisc 30 MG/2ML Intra-articular Solution Prefilled Syringe; INJECT 2  ML    Intra-articular;  To Be Done: 82WTN4457; Status: HOLD FOR - Administration Ordered   19  OrthoVisc 30 MG/2ML Intra-articular Solution Prefilled Syringe; INJECT 2  ML    Intra-articular; To Be Done: 08WJX5518; Status: HOLD FOR - Administration Ordered   20  OrthoVisc 30 MG/2ML Intra-articular Solution Prefilled Syringe; INJECT 2  ML    Intra-articular; To Be Done: 04OZX7893; Status: HOLD FOR - Administration Ordered   21  OrthoVisc 30 MG/2ML Intra-articular Solution Prefilled Syringe; INJECT 4 ML Weekly to    both knees for 3 weeks; Therapy: 64YPZ8165 to (Evaluate:48Qph6656); Last Rx:20Jan2017 Ordered   22  Pennsaid 2 % Transdermal Solution; APPLY 2 PUMPS TO AFFECTED KNEE 2 TIMES    DAILY; Therapy: 57NWK4752 to (Last Rx:03Mar2017)  Requested for: 33CED6835 Ordered   23  Synvisc 16 MG/2ML Intra-articular Solution Prefilled Syringe; As directed bilaterally; Therapy: 83BUE3208 to (Last Rx:29Mar2016) Ordered    24  ROPINIRole HCl - 0 5 MG Oral Tablet; take 1 to 4 tablets by mouth at bedtime; Therapy: 83USM4561 to (Evaluate:64Ykv2590)  Requested for: 89Bwr6119; Last    Rx:08Tsj7524; Status: ACTIVE - Retrospective By Protocol Authorization Ordered    25  Multi-Vitamins Oral Tablet; TAKE 1 TABLET DAILY; Therapy: 97DPX0521 to Recorded    Allergies    1  No Known Drug Allergies    Health Management   *VB - Eye Exam; every 1 year; Next Due: 16ZZF8410; Overdue  *VB - Foot Exam; every 1 year; Last 15VQY5501; Next Due: 68LSC9461; Active    End of Encounter Meds    1  Azithromycin 250 MG Oral Tablet; TAKE 2 TABLETS ON DAY 1 THEN TAKE 1 TABLET A   DAY FOR 4 DAYS; Therapy: 41Awo6666 to (Last Rx:63Phm1801)  Requested for: 78Rfc6522 Ordered   2  PredniSONE 20 MG Oral Tablet; 1 bid x 5 days , 1 qd x 5 days; Therapy: 58Elp7790 to (Evaluate:66Ekz1673)  Requested for: 52Xlz5599; Last   Rx:51Zit2040 Ordered   3  ProAir  (90 Base) MCG/ACT Inhalation Aerosol Solution; INHALE 1 TO 2 PUFFS   EVERY 4 TO 6 HOURS AS NEEDED;    Therapy: 22NQC3800 to (Evaluate:10Jun2017)  Requested for: 47Ewc9047; Last   Rx:80Fmw9973; Status: ACTIVE - Retrospective By Protocol Authorization Ordered   4  Ventolin  (90 Base) MCG/ACT Inhalation Aerosol Solution; inhale 1 to 2 puffs   every 4 to 6 hours if needed; Therapy: 14RGS4114 to (Evaluate:14Jhf6588)  Requested for: 73Les6355; Last   Rx:52Tef3161; Status: ACTIVE - Retrospective By Protocol Authorization Ordered    5  Dulera 200-5 MCG/ACT Inhalation Aerosol; inhale 2 puffs by mouth twice a day; Therapy: 90Qoc9476 to (Evaluate:04Ajk8418)  Requested for: 02Bfv5815; Last   Rx:32Ywd5879; Status: ACTIVE - Retrospective By Protocol Authorization Ordered   6  HydroCHLOROthiazide 12 5 MG Oral Capsule; Take 1 capsule by mouth  daily; Therapy: 88CCH9350 to (11 )  Requested for: 26Apr2017; Last   Rx:29Yzu3184 Ordered   7  Lisinopril 40 MG Oral Tablet; Take 1 tablet by mouth  daily; Therapy: 71LPD1325 to (Evaluate:82Abh6894)  Requested for: 97Gik8649; Last   Rx:05Ozd8161 Ordered    8  Diclofenac Sodium  MG Oral Tablet Extended Release 24 Hour; take 1 tablet by   mouth once daily; Therapy: 36MSW9720 to (Evaluate:08Apr2017)  Requested for: 14GBT6162; Last   Rx:11Ygk4641 Ordered   9  Hydrocodone-Acetaminophen  MG Oral Tablet; 1 q 6hrs prn; Therapy: 57TNJ8829 to (Last Rx:05Erk9346) Ordered    10  MetFORMIN HCl - 500 MG Oral Tablet; take 1 tablet by mouth twice a day as directed; Therapy: 81RYU8160 to (Evaluate:24Smf9127)  Requested for: 94Ppi2013; Last    Rx:45Ttn2903; Status: ACTIVE - Retrospective By Protocol Authorization Ordered    11  Basaglar KwikPen 100 UNIT/ML Subcutaneous Solution Pen-injector; INJECT 35 UNIT    Daily; Therapy: 86CDH4220 to (Last Rx:70Sxi7225)  Requested for: 55Koj0106; Status:    ACTIVE - Retrospective By Protocol Authorization Ordered   12  HumaLOG KwikPen 100 UNIT/ML Subcutaneous Solution Pen-injector; INJECT 7 UNIT    Daily;     Therapy: 95TDR5005 to (Last Rx:17Aug2017)  Requested for: 96Eal2141; Status:    ACTIVE - Retrospective By Protocol Authorization Ordered    13  Allopurinol 100 MG Oral Tablet; take 2 tablets by mouth once daily; Therapy: 00SVB6674 to (Evaluate:73Dhv2514)  Requested for: 69JDX3972; Last    Rx:73Nfl3759 Ordered   14  Indomethacin 25 MG Oral Capsule; TAKE 1-2 CAPSULES 3 TIMES DAILY; Therapy: 07VOF4417 to (Evaluate:77Njq0686)  Requested for: 47JTB3096; Last    Rx:05Rsn9188 Ordered    15  Atorvastatin Calcium 20 MG Oral Tablet; take 1 tablet by mouth once daily; Therapy: 90JWY9739 to (Evaluate:56Tas2771)  Requested for: 52Jid7873; Last    Rx:40Gmb7508; Status: ACTIVE - Retrospective By Protocol Authorization Ordered    16  Gabapentin 100 MG Oral Capsule; 1-2 TID; Therapy: 37Bpe8333 to (Last Rx:76Trx9932)  Requested for: 58Jaj6135 Ordered    17  OrthoVisc 30 MG/2ML Intra-articular Solution Prefilled Syringe; INJECT 2  ML    Intra-articular 2 ML's weekly in knee for 3 weeks; Therapy: 90Ujc0714 to (Last Rx:64Rus1154) Ordered   18  OrthoVisc 30 MG/2ML Intra-articular Solution Prefilled Syringe; INJECT 2  ML    Intra-articular; To Be Done: 19MXE0136; Status: HOLD FOR - Administration Ordered   19  OrthoVisc 30 MG/2ML Intra-articular Solution Prefilled Syringe; INJECT 2  ML    Intra-articular; To Be Done: 25WHA5340; Status: HOLD FOR - Administration Ordered   20  OrthoVisc 30 MG/2ML Intra-articular Solution Prefilled Syringe; INJECT 2  ML    Intra-articular; To Be Done: 06OAO7355; Status: HOLD FOR - Administration Ordered   21  OrthoVisc 30 MG/2ML Intra-articular Solution Prefilled Syringe; INJECT 4 ML Weekly to    both knees for 3 weeks; Therapy: 67XUW4220 to (Evaluate:81Jkn1794); Last Rx:20Jan2017 Ordered   22  Pennsaid 2 % Transdermal Solution; APPLY 2 PUMPS TO AFFECTED KNEE 2 TIMES    DAILY; Therapy: 91GAM8351 to (Last Rx:03Mar2017)  Requested for: 30IRJ6573 Ordered   23   Synvisc 16 MG/2ML Intra-articular Solution Prefilled Syringe; As directed bilaterally; Therapy: 17WPL0880 to (Last Rx:29Mar2016) Ordered    24  ROPINIRole HCl - 0 5 MG Oral Tablet (Requip); take 1 to 4 tablets by mouth at bedtime; Therapy: 60XNE5851 to (Evaluate:80Plp3883)  Requested for: 07Owl8821; Last    Rx:57Dla6038; Status: ACTIVE - Retrospective By Protocol Authorization Ordered    25  Multi-Vitamins Oral Tablet; TAKE 1 TABLET DAILY;     Therapy: 34ARY1094 to Recorded    Future Appointments    Date/Time Provider Specialty Site   08/30/2017 03:00 PM Tirso Carpenter MD Family Medicine [facility] 9145     Patient Care Team    Care Team Member Role Specialty Office Number   Rafael Wade MD  Cardiology [phone], Donn Choudhury MD  Orthopedic Surgery [phone]     Signatures   Electronically signed by : Kylah Caraballo RN; Aug 29 2017  1:03PM EST                       (Author) declines

## 2022-06-12 NOTE — PROGRESS NOTE ADULT - SUBJECTIVE AND OBJECTIVE BOX
Neurology Stroke Progress Note    HPI: Patient is a 89 year old female with HFpEF with sever MR s/p clip, a fib s/p DCC on Eliquis, HTN, breast and rectal cancer in remission, active MM presented to [facility] for imbalance. Patient reports for the past two weeks, she has been experiencing recurrent episodes of imbalance that last anywhere from a few minutes to a few hours. Reports no associated weakness, numbness, speech disturbance, visual disturbance, vertigo, chest pain. Upon arrival to [facility], NIHHS 0. Patient with narrow steady gait unassisted. HCT with acute ischemia or bleed.     INTERVAL HPI/OVERNIGHT EVENTS:  Patient seen and examined. States her lightheadedness gets worse when she goes from a lying to sitting position. Denies any other acute neurological complaints.     MEDICATIONS  (STANDING):  aMIOdarone    Tablet 200 milliGRAM(s) Oral every 24 hours  apixaban 2.5 milliGRAM(s) Oral every 12 hours  atorvastatin 40 milliGRAM(s) Oral at bedtime  levothyroxine 25 MICROGram(s) Oral every 24 hours    MEDICATIONS  (PRN):  acetaminophen     Tablet .. 650 milliGRAM(s) Oral every 6 hours PRN Temp greater or equal to 38C (100.4F), Mild Pain (1 - 3)  melatonin 3 milliGRAM(s) Oral at bedtime PRN Insomnia      Allergies    No Known Allergies    Intolerances        Vital Signs Last 24 Hrs  T(C): 36.4 (2022 09:21), Max: 36.6 (2022 16:11)  T(F): 97.6 (2022 09:21), Max: 97.9 (2022 16:45)  HR: 59 (2022 09:21) (58 - 74)  BP: 138/69 (2022 09:21) (122/65 - 151/65)  BP(mean): --  RR: 18 (2022 09:21) (16 - 19)  SpO2: 97% (2022 09:21) (96% - 100%)    Physical exam:  Neurologic:  -Mental status: Awake, alert, oriented to person, place, and time. Speech is fluent with intact naming, repetition, and comprehension, no dysarthria. Recent and remote memory intact. Follows commands. Attention/concentration intact.   -Cranial nerves:   II: Visual fields are full to finger counting  III, IV, VI: Extraocular movements are intact without nystagmus. Pupils equally round and reactive to light  V:  Facial sensation V1-V3 equal and intact   VII: Face is symmetric with normal eye closure and smile  XII: Tongue protrudes midline  Motor: Normal bulk and tone. No pronator drift. Strength bilateral upper extremity 5/5, bilateral lower extremities 5/5.  Sensation: Intact to light touch bilaterally. No neglect or extinction on double simultaneous testing.  Coordination: No dysmetria on finger-to-nose bilaterally  Gait: steady, takes shorter steps    LABS:                        10.8   8.51  )-----------( 241      ( 2022 05:30 )             34.4     06-12    141  |  104  |  22  ----------------------------<  95  4.2   |  28  |  1.43<H>    Ca    9.0      2022 05:30  Phos  3.5     12  Mg     2.3     12    TPro  7.4  /  Alb  3.6  /  TBili  0.6  /  DBili  x   /  AST  21  /  ALT  14  /  AlkPhos  139<H>  06-12    PT/INR - ( 2022 16:47 )   PT: 16.7 sec;   INR: 1.40          PTT - ( 2022 16:47 )  PTT:32.0 sec  Urinalysis Basic - ( 2022 09:03 )    Color: Yellow / Appearance: Clear / S.015 / pH: x  Gluc: x / Ketone: NEGATIVE  / Bili: Negative / Urobili: 0.2 E.U./dL   Blood: x / Protein: NEGATIVE mg/dL / Nitrite: NEGATIVE   Leuk Esterase: NEGATIVE / RBC: x / WBC x   Sq Epi: x / Non Sq Epi: x / Bacteria: x        RADIOLOGY & ADDITIONAL TESTS:  CTH: No acute intracranial hemorrhage, mass effect or CT evidence of recent   transcortical infarct. No significant change compared to 2020.     Neurology Stroke Progress Note    HPI: Patient is a 89 year old female with HFpEF with sever MR s/p clip, a fib s/p DCC on Eliquis, HTN, breast and rectal cancer in remission, active MM presented to [facility] for imbalance. Patient reports for the past two weeks, she has been experiencing recurrent episodes of imbalance that last anywhere from a few minutes to a few hours. Reports no associated weakness, numbness, speech disturbance, visual disturbance, vertigo, chest pain. Upon arrival to [facility], NIHHS 0. Patient with narrow steady gait unassisted. HCT without acute ischemia or bleed.     INTERVAL HPI/OVERNIGHT EVENTS:  Patient seen and examined. States her lightheadedness gets worse when she goes from a lying to sitting position. Denies any other acute neurological complaints.     MEDICATIONS  (STANDING):  aMIOdarone    Tablet 200 milliGRAM(s) Oral every 24 hours  apixaban 2.5 milliGRAM(s) Oral every 12 hours  atorvastatin 40 milliGRAM(s) Oral at bedtime  levothyroxine 25 MICROGram(s) Oral every 24 hours    MEDICATIONS  (PRN):  acetaminophen     Tablet .. 650 milliGRAM(s) Oral every 6 hours PRN Temp greater or equal to 38C (100.4F), Mild Pain (1 - 3)  melatonin 3 milliGRAM(s) Oral at bedtime PRN Insomnia      Allergies    No Known Allergies    Intolerances        Vital Signs Last 24 Hrs  T(C): 36.4 (2022 09:21), Max: 36.6 (2022 16:11)  T(F): 97.6 (2022 09:21), Max: 97.9 (2022 16:45)  HR: 59 (2022 09:21) (58 - 74)  BP: 138/69 (2022 09:21) (122/65 - 151/65)  BP(mean): --  RR: 18 (2022 09:21) (16 - 19)  SpO2: 97% (2022 09:21) (96% - 100%)    Physical exam:  Neurologic:  -Mental status: Awake, alert, oriented to person, place, and time. Speech is fluent with intact naming, repetition, and comprehension, no dysarthria. Recent and remote memory intact. Follows commands. Attention/concentration intact.   -Cranial nerves:   II: Visual fields are full to finger counting  III, IV, VI: Extraocular movements are intact without nystagmus. Pupils equally round and reactive to light  V:  Facial sensation V1-V3 equal and intact   VII: Face is symmetric with normal eye closure and smile  XII: Tongue protrudes midline  Motor: Normal bulk and tone. No pronator drift. Strength bilateral upper extremity 5/5, bilateral lower extremities 5/5.  Sensation: Intact to light touch bilaterally. No neglect or extinction on double simultaneous testing.  Coordination: No dysmetria on finger-to-nose bilaterally  Gait: steady, takes shorter steps    LABS:                        10.8   8.51  )-----------( 241      ( 2022 05:30 )             34.4     06-12    141  |  104  |  22  ----------------------------<  95  4.2   |  28  |  1.43<H>    Ca    9.0      2022 05:30  Phos  3.5     12  Mg     2.3     12    TPro  7.4  /  Alb  3.6  /  TBili  0.6  /  DBili  x   /  AST  21  /  ALT  14  /  AlkPhos  139<H>  06-12    PT/INR - ( 2022 16:47 )   PT: 16.7 sec;   INR: 1.40          PTT - ( 2022 16:47 )  PTT:32.0 sec  Urinalysis Basic - ( 2022 09:03 )    Color: Yellow / Appearance: Clear / S.015 / pH: x  Gluc: x / Ketone: NEGATIVE  / Bili: Negative / Urobili: 0.2 E.U./dL   Blood: x / Protein: NEGATIVE mg/dL / Nitrite: NEGATIVE   Leuk Esterase: NEGATIVE / RBC: x / WBC x   Sq Epi: x / Non Sq Epi: x / Bacteria: x        RADIOLOGY & ADDITIONAL TESTS:  CTH: No acute intracranial hemorrhage, mass effect or CT evidence of recent   transcortical infarct. No significant change compared to 2020.

## 2022-06-12 NOTE — PATIENT PROFILE ADULT - FALL HARM RISK - HARM RISK INTERVENTIONS

## 2022-06-12 NOTE — DISCHARGE NOTE PROVIDER - NSDCMRMEDTOKEN_GEN_ALL_CORE_FT
amiodarone 200 mg oral tablet: 1 tab(s) orally once a day  apixaban 2.5 mg oral tablet: 1 tab(s) orally 2 times a day  letrozole 2.5 mg oral tablet: 1 tab(s) orally once a day  levothyroxine 25 mcg (0.025 mg) oral tablet: 1 tab(s) orally once a day  Lipitor 40 mg oral tablet: 1 tab(s) orally every other day  metoprolol succinate 25 mg oral tablet, extended release: 1 tab(s) orally once a day   amiodarone 200 mg oral tablet: 1 tab(s) orally once a day  apixaban 2.5 mg oral tablet: 1 tab(s) orally 2 times a day  letrozole 2.5 mg oral tablet: 1 tab(s) orally once a day  levothyroxine 25 mcg (0.025 mg) oral tablet: 1 tab(s) orally once a day  Lipitor 40 mg oral tablet: 1 tab(s) orally every other day  metoprolol succinate 25 mg oral tablet, extended release: 1 tab(s) orally once a day  Soaanz 20 mg oral tablet: 1 tab(s) orally every 24 hours

## 2022-06-12 NOTE — PROGRESS NOTE ADULT - ATTENDING COMMENTS
Pt with h/o AF on eloquis, HF pef presented with episodes of transient vision loss and lower ext weakness and admitted for eval of stroke. Initially imp was presyncope due to orthostatic hypotension and diuretic was w/h but pt is being w/u further to r/o stroke due to 2 episodes of vision loss as per her cardiologist(  [phone])  MRI to r/o stroke/mets  echo, doppler  Neuro consult if needs transfer to tele  dvt prophylaxis  Pt has 3 cancers( breast,rectal cancer and multiple myeloma) and now being w/u for stroke. consider Palliative consult and discuss GOC  PT eval and discharge planning

## 2022-06-12 NOTE — DISCHARGE NOTE PROVIDER - NSDCCPCAREPLAN_GEN_ALL_CORE_FT
PRINCIPAL DISCHARGE DIAGNOSIS  Diagnosis: Orthostatic hypotension  Assessment and Plan of Treatment:        PRINCIPAL DISCHARGE DIAGNOSIS  Diagnosis: Orthostatic hypotension  Assessment and Plan of Treatment: You were found to have orthostatic hypotension on admission. We decreased the dose of your Torsemide to 20mg instead of 40mg. Please ensure that you drink adequate fluids. Please be careful when standing from a seated position. Please follow up with your outpatient cardiologist, Dr. Jules.

## 2022-06-12 NOTE — PROGRESS NOTE ADULT - ASSESSMENT
Patient is a 89 year old female with HFpEF with sever MR s/p clip, a fib s/p DCC on Eliquis, HTN, breast and rectal cancer in remission, active MM presented to [facility] for imbalance x 2 weeks. Upon arrival to [facility], NIHSS 0, HCT without acute intracranial pathology. Patient with narrow steady gait unassisted. Low suspicion for stroke as etiology of imbalance - would expect to see ischemic infarct on head CT after two weeks of symptoms.     - Recommend performing orthostatic vital signs  - Can obtain MR brain to confirm no stroke   - if MRI shows stroke, please re-consult    Case discussed with Dr. Nisha Burgos   Patient is a 89 year old female with HFpEF with sever MR s/p clip, a fib s/p DCC on Eliquis, HTN, breast and rectal cancer in remission, active MM presented to [facility] for imbalance x 2 weeks. Upon arrival to [facility], NIHSS 0, HCT without acute intracranial pathology. Patient with narrow steady gait unassisted. Low suspicion for stroke as etiology of imbalance - would expect to see ischemic infarct on head CT after two weeks of symptoms.     - Recommend performing orthostatic vital signs  - Can obtain MR brain to confirm no stroke   - stroke to sign off, if MRI shows stroke, please re-consult    Case discussed with Dr. Nisha Burgos   Patient is a 89 year old female with HFpEF with sever MR s/p clip, a fib s/p DCC on Eliquis, HTN, breast and rectal cancer in remission, active MM presented to [facility] for imbalance x 2 weeks. Upon arrival to [facility], NIHSS 0, HCT without acute intracranial pathology. Patient with narrow steady gait unassisted. Low suspicion for stroke as etiology of imbalance - would expect to see ischemic infarct on head CT after two weeks of symptoms.     - Recommend performing orthostatic vital signs  - MRI neg for acute ischemia  - stroke to sign off  - please re-consult for any questions or concerns    Case discussed with Dr. Nisha Burgos   Patient is a 89 year old female with HFpEF with sever MR s/p clip, a fib s/p DCC on Eliquis, HTN, breast and rectal cancer in remission, active MM presented to [facility] for imbalance x 2 weeks. Upon arrival to [facility], NIHSS 0, HCT without acute intracranial pathology. Patient with narrow steady gait unassisted. Low suspicion for stroke as etiology of imbalance - would expect to see ischemic infarct on head CT after two weeks of symptoms.     - Recommend performing orthostatic vital signs  - MRI neg for acute ischemia  - keep BP well controlled (normotension) as patient has old blood products on MRI  - stroke to sign off  - please re-consult for any questions or concerns    Case discussed with Dr. Nisha Burgos

## 2022-06-12 NOTE — DISCHARGE NOTE PROVIDER - HOSPITAL COURSE
#Discharge: do not delete  Mrs. Aguilar is a 89 yo F with PMHx of HFpEF with severe MR s/p clip, breast and rectal cancer in remission, currently on treatement for MM, Afib s/p DCC, and HTN who was admitted to [facility] to rule out stroke. She states that she has felt more weak within the last week but that she has felt weak within the past 1-2 years. Her symptoms are worst when she stands quickly, and there is no accompanying vertigo, palpitations, tunnel vision, chest pain, or dyspnea. She reports having a near fall about once a month, but has had 2 episodes in the past week: on 6/9/22 when she also had floaters in her L visual field for 2 minutes as well as unsteadiness and on day of admission when she felt weak and unsteady.  Has been following closely with Dr. Dhara pedersen HERNANDEZ on torsemide 20. She was taking double doses 5/31-6/5 which helped with her edema and did not trigger a lightheadedness episode -- resumed normal dosing 3 days before her 6/9 episode. CT head was negative for stroke. Pt's primary care doctor was updated and the case was discussed. After physical therapy evaluation, pt was deemed eligible for ______________.    Problem List/Main Diagnoses (system-based):  Problem: Orthostatic hypotension.   Confirmed on admission with SBP drop from 168 sitting to 130 standing with diastolic 69->58. This correlates with her symptom onset immediately upon standing and worst in the morning before she has taken PO. The etiology is likely iatrogenic hypovolemia with daily torsemide and last week doubling up on the torsemide. Her metoprolol may be contributing; not taking other alpha inhibitors. It is possible that there is also a component of musculoskeletal weakness worsened by myeloma treatment, but that is likely secondary. PT consultation  Plan:  - Restart home torsemide upon f/u with Dr. Jules   - Encourage PO intake  - C/w amio 200 QD     Problem: Chronic diastolic congestive heart failure.   Longstanding Hx with recent unchanged TTE July 2021 in [place] with another manually input 8/10/21 TTE showing severe MR. Her MR may be contributing to her inability to mount an appropriate alpha agonist response to change in position.  Plan:   - C/w atorvastatin 40 and amio QD  - Limited TTE  - F/u with Dr. Jules    Problem: Acute kidney injury superimposed on CKD.   Admission Cr 1.58; her baseline has fluctuated for the past year but in 2021 she was 1.22 so this may be a mild DAVID secondary to torsemide vs progression of CKD. Follows with Dr. Segovia. Given heart failure and edema, observed while off IVF.  Plan:  - F/u with PCP     Problem: Atrial fibrillation.   D/sp DCC 5/2021 on amiodarone, metoprolol, eliquis  - c/w amiodarone, eliquis.    Problem: Multiple myeloma.   Follows with Dr. Benito Giron, taking Velicaid weekly and an infusion every 6 weeks for bone health (bisphosphonate?)  - c/w velicaid, letrozole, infusion as outpatient  - f/u outpatient.    Patient was discharged to: home  New medications: none  Changes to old medications: none  Medications that were stopped: torsemide 20mg BID   Items to follow up as outpatient: chem panel; carotid U/S; MRI brain    Physical exam at the time of discharge:  Constitutional: thin elderly woman resting comfortably in bed; NAD  Head: NC  Eyes: PER, EOMI, anicteric sclera  ENT: no nasal discharge; uvula midline, no oropharyngeal erythema or exudates; MMM  Neck: supple; no JVD  Respiratory: CTA B/L; no W/R/R, no retractions; lung sounds distant given habitus  Cardiac: +S1/S2; RRR; no M/R/G  Gastrointestinal: abdomen soft, NT/ND; no rebound or guarding  Extremities: bilateral 1+ edema behind ankles, symmetric, some venous stasis dermatitis on b/l anterior shins  Vascular: 2+ radial, DP pulses B/L  Dermatologic: skin warm, dry and intact; no rashes, wounds, or scars  Neurologic: AAOx3; CNII-XII grossly intact; no focal deficits; moderately hearing impaired; difficulty with word finding and descriptions without aid of partner  Psychiatric: affect and characteristics of appearance, verbalizations, behaviors are appropriate   #Discharge: do not delete  Mrs. Aguilar is a 89 yo F with PMHx of HFpEF with severe MR s/p clip, breast and rectal cancer in remission, currently on treatement for MM, Afib s/p DCC, and HTN who was admitted i/s/o orthostatic hypotension     #Orthostatic hypotension  Confirmed on admission with Systolic BP drop from 168 sitting to 130 standing. This correlates with her symptom onset immediately upon standing and worst in the morning before she has taken PO. The etiology is likely iatrogenic hypovolemia with daily torsemide and last week doubling up on the torsemide. MRI r/o acute pathology.   - counseled on adequate PO intake    #Chronic diastolic congestive heart failure  Longstanding Hx with recent unchanged TTE July 2021 in [place] with another manually input 8/10/21 TTE showing severe MR. Her MR may be contributing to her inability to mount an appropriate alpha agonist response to change in position.  - c/w metoprolol succinate 25mg PO QD, amiodarone 200mg PO QD, Torsemide 20mg PO QD  - f/u with Dr. Jules    #Atrial fibrillation  - c/w metoprolol succinate 25mg PO QD, amiodarone 200mg PO QD    #Multiple myeloma  - f/u with Dr. Benito Giron outpatient    Physical exam at the time of discharge:  Constitutional: thin elderly woman resting comfortably in bed; NAD  Head: NC  Eyes: PER, EOMI, anicteric sclera  ENT: no nasal discharge; uvula midline, no oropharyngeal erythema or exudates; MMM  Neck: supple; no JVD  Respiratory: CTA B/L; no W/R/R, no retractions; lung sounds distant given habitus  Cardiac: +S1/S2; RRR; no M/R/G  Gastrointestinal: abdomen soft, NT/ND; no rebound or guarding  Extremities: bilateral 1+ edema behind ankles, symmetric, some venous stasis dermatitis on b/l anterior shins  Vascular: 2+ radial, DP pulses B/L  Dermatologic: skin warm, dry and intact; no rashes, wounds, or scars  Neurologic: AAOx3; CNII-XII grossly intact; no focal deficits; moderately hearing impaired; difficulty with word finding and descriptions without aid of partner  Psychiatric: affect and characteristics of appearance, verbalizations, behaviors are appropriate   #Discharge: do not delete  Mrs. Aguilar is a 89 yo F with PMHx of HFpEF with severe MR s/p clip, breast and rectal cancer in remission, currently on treatement for MM, Afib s/p DCC, and HTN who was admitted i/s/o orthostatic hypotension  #Acute Tubular Necrosis  -Pt admitted with elevated Cr. (1.8) from baseline of 1.2 likely in setting of hypotension and poor perfusion  -Cr. was elevated for > 72 hours in setting of disease    #Orthostatic hypotension  Confirmed on admission with Systolic BP drop from 168 sitting to 130 standing. This correlates with her symptom onset immediately upon standing and worst in the morning before she has taken PO. The etiology is likely iatrogenic hypovolemia with daily torsemide and last week doubling up on the torsemide. MRI r/o acute pathology.   - counseled on adequate PO intake    #Chronic diastolic congestive heart failure  Longstanding Hx with recent unchanged TTE July 2021 in [place] with another manually input 8/10/21 TTE showing severe MR. Her MR may be contributing to her inability to mount an appropriate alpha agonist response to change in position.  - c/w metoprolol succinate 25mg PO QD, amiodarone 200mg PO QD, Torsemide 20mg PO QD  - f/u with Dr. Jules    #Chronic Atrial fibrillation  - c/w metoprolol succinate 25mg PO QD, amiodarone 200mg PO QD    #Multiple myeloma  - f/u with Dr. Benito Giron outpatient    Physical exam at the time of discharge:  Constitutional: thin elderly woman resting comfortably in bed; NAD  Head: NC  Eyes: PER, EOMI, anicteric sclera  ENT: no nasal discharge; uvula midline, no oropharyngeal erythema or exudates; MMM  Neck: supple; no JVD  Respiratory: CTA B/L; no W/R/R, no retractions; lung sounds distant given habitus  Cardiac: +S1/S2; RRR; no M/R/G  Gastrointestinal: abdomen soft, NT/ND; no rebound or guarding  Extremities: bilateral 1+ edema behind ankles, symmetric, some venous stasis dermatitis on b/l anterior shins  Vascular: 2+ radial, DP pulses B/L  Dermatologic: skin warm, dry and intact; no rashes, wounds, or scars  Neurologic: AAOx3; CNII-XII grossly intact; no focal deficits; moderately hearing impaired; difficulty with word finding and descriptions without aid of partner  Psychiatric: affect and characteristics of appearance, verbalizations, behaviors are appropriate

## 2022-06-12 NOTE — PROGRESS NOTE ADULT - SUBJECTIVE AND OBJECTIVE BOX
INTERVAL/OVERNIGHT EVENTS: naeo    SUBJECTIVE: pt seen and examined at bedside. No acute complaints. Denies any HA, blurry vision, chest pain, palpitations, abd pain, n/v/d, or sever b/l LE swelling.    OBJECTIVE  Vital Signs Last 24 Hrs  T(C): 36.4 (12 Jun 2022 09:21), Max: 36.6 (11 Jun 2022 16:11)  T(F): 97.6 (12 Jun 2022 09:21), Max: 97.9 (11 Jun 2022 16:45)  HR: 59 (12 Jun 2022 09:21) (58 - 74)  BP: 138/69 (12 Jun 2022 09:21) (122/65 - 151/65)  RR: 18 (12 Jun 2022 09:21) (16 - 19)  SpO2: 97% (12 Jun 2022 09:21) (96% - 100%) RA    Physical Exam   Constitutional: thin elderly woman resting comfortably in bed; NAD  Head: NC  Eyes: PER, EOMI, anicteric sclera  ENT: no nasal discharge; uvula midline, no oropharyngeal erythema or exudates; MMM  Neck: supple; no JVD  Respiratory: CTA B/L; no W/R/R, no retractions; lung sounds distant given habitus  Cardiac: +S1/S2; RRR; no M/R/G  Gastrointestinal: abdomen soft, NT/ND; no rebound or guarding  Extremities: bilateral 1+ edema behind ankles, symmetric, some venous stasis dermatitis on b/l anterior shins  Vascular: 2+ radial, DP pulses B/L  Dermatologic: skin warm, dry and intact; no rashes, wounds, or scars  Neurologic: AAOx3; CNII-XII grossly intact; no focal deficits; moderately hearing impaired; difficulty with word finding and descriptions without aid of partner  Psychiatric: affect and characteristics of appearance, verbalizations, behaviors are appropriate    Labs                          10.8   8.51  )-----------( 241      ( 12 Jun 2022 05:30 )             34.4     06-12    141  |  104  |  22  ----------------------------<  95  4.2   |  28  |  1.43<H>    Ca    9.0      12 Jun 2022 05:30  Phos  3.5     06-12  Mg     2.3     06-12    TPro  7.4  /  Alb  3.6  /  TBili  0.6  /  DBili  x   /  AST  21  /  ALT  14  /  AlkPhos  139<H>  06-12      RADIOLOGY & ADDITIONAL TESTS REVIEWED: yes

## 2022-06-12 NOTE — DISCHARGE NOTE PROVIDER - NSDCFUSCHEDAPPT_GEN_ALL_CORE_FT
Renay Jules Physician [facility]  Cardio Vasc [address]  Scheduled Appointment: 06/14/2022    Renay Jules  [facility]  HEARTVASC [address]  Scheduled Appointment: 06/16/2022    Marcos Segovia  [facility] Physician [facility]  NEPHRO [address]  Scheduled Appointment: 07/22/2022

## 2022-06-12 NOTE — DISCHARGE NOTE PROVIDER - NSDCADMDATE_GEN_ALL_CORE_FT
Dimple with [employer] calling to follow up on Physician order and plan of care form that was faxed over. Please advise if form has been received.    
Spoke to candy who has received completed forms. No further questions.   
11-Jun-2022 19:50

## 2022-06-13 ENCOUNTER — TRANSCRIPTION ENCOUNTER (OUTPATIENT)
Age: 87
End: 2022-06-13

## 2022-06-13 VITALS
RESPIRATION RATE: 18 BRPM | SYSTOLIC BLOOD PRESSURE: 146 MMHG | HEART RATE: 65 BPM | OXYGEN SATURATION: 98 % | TEMPERATURE: 96 F | DIASTOLIC BLOOD PRESSURE: 71 MMHG

## 2022-06-13 DIAGNOSIS — Z51.5 ENCOUNTER FOR PALLIATIVE CARE: ICD-10-CM

## 2022-06-13 DIAGNOSIS — R53.81 OTHER MALAISE: ICD-10-CM

## 2022-06-13 DIAGNOSIS — Z71.89 OTHER SPECIFIED COUNSELING: ICD-10-CM

## 2022-06-13 LAB
ANION GAP SERPL CALC-SCNC: 10 MMOL/L — SIGNIFICANT CHANGE UP (ref 5–17)
BUN SERPL-MCNC: 19 MG/DL — SIGNIFICANT CHANGE UP (ref 7–23)
CALCIUM SERPL-MCNC: 8.8 MG/DL — SIGNIFICANT CHANGE UP (ref 8.4–10.5)
CHLORIDE SERPL-SCNC: 107 MMOL/L — SIGNIFICANT CHANGE UP (ref 96–108)
CO2 SERPL-SCNC: 25 MMOL/L — SIGNIFICANT CHANGE UP (ref 22–31)
CREAT SERPL-MCNC: 1.23 MG/DL — SIGNIFICANT CHANGE UP (ref 0.5–1.3)
EGFR: 42 ML/MIN/1.73M2 — LOW
GLUCOSE SERPL-MCNC: 86 MG/DL — SIGNIFICANT CHANGE UP (ref 70–99)
HCT VFR BLD CALC: 35 % — SIGNIFICANT CHANGE UP (ref 34.5–45)
HGB BLD-MCNC: 11 G/DL — LOW (ref 11.5–15.5)
MAGNESIUM SERPL-MCNC: 2.3 MG/DL — SIGNIFICANT CHANGE UP (ref 1.6–2.6)
MCHC RBC-ENTMCNC: 27.8 PG — SIGNIFICANT CHANGE UP (ref 27–34)
MCHC RBC-ENTMCNC: 31.4 GM/DL — LOW (ref 32–36)
MCV RBC AUTO: 88.4 FL — SIGNIFICANT CHANGE UP (ref 80–100)
NRBC # BLD: 0 /100 WBCS — SIGNIFICANT CHANGE UP (ref 0–0)
PHOSPHATE SERPL-MCNC: 3.2 MG/DL — SIGNIFICANT CHANGE UP (ref 2.5–4.5)
PLATELET # BLD AUTO: 269 K/UL — SIGNIFICANT CHANGE UP (ref 150–400)
POTASSIUM SERPL-MCNC: 4 MMOL/L — SIGNIFICANT CHANGE UP (ref 3.5–5.3)
POTASSIUM SERPL-SCNC: 4 MMOL/L — SIGNIFICANT CHANGE UP (ref 3.5–5.3)
RBC # BLD: 3.96 M/UL — SIGNIFICANT CHANGE UP (ref 3.8–5.2)
RBC # FLD: 15.4 % — HIGH (ref 10.3–14.5)
SODIUM SERPL-SCNC: 142 MMOL/L — SIGNIFICANT CHANGE UP (ref 135–145)
WBC # BLD: 8.72 K/UL — SIGNIFICANT CHANGE UP (ref 3.8–10.5)
WBC # FLD AUTO: 8.72 K/UL — SIGNIFICANT CHANGE UP (ref 3.8–10.5)

## 2022-06-13 PROCEDURE — 80048 BASIC METABOLIC PNL TOTAL CA: CPT

## 2022-06-13 PROCEDURE — 85730 THROMBOPLASTIN TIME PARTIAL: CPT

## 2022-06-13 PROCEDURE — A9585: CPT

## 2022-06-13 PROCEDURE — 81003 URINALYSIS AUTO W/O SCOPE: CPT

## 2022-06-13 PROCEDURE — 84100 ASSAY OF PHOSPHORUS: CPT

## 2022-06-13 PROCEDURE — 36415 COLL VENOUS BLD VENIPUNCTURE: CPT

## 2022-06-13 PROCEDURE — 87635 SARS-COV-2 COVID-19 AMP PRB: CPT

## 2022-06-13 PROCEDURE — 93306 TTE W/DOPPLER COMPLETE: CPT | Mod: 26

## 2022-06-13 PROCEDURE — 99291 CRITICAL CARE FIRST HOUR: CPT

## 2022-06-13 PROCEDURE — 82570 ASSAY OF URINE CREATININE: CPT

## 2022-06-13 PROCEDURE — 84484 ASSAY OF TROPONIN QUANT: CPT

## 2022-06-13 PROCEDURE — 70450 CT HEAD/BRAIN W/O DYE: CPT | Mod: MA

## 2022-06-13 PROCEDURE — 84300 ASSAY OF URINE SODIUM: CPT

## 2022-06-13 PROCEDURE — 82962 GLUCOSE BLOOD TEST: CPT

## 2022-06-13 PROCEDURE — 97161 PT EVAL LOW COMPLEX 20 MIN: CPT

## 2022-06-13 PROCEDURE — 70553 MRI BRAIN STEM W/O & W/DYE: CPT

## 2022-06-13 PROCEDURE — 85610 PROTHROMBIN TIME: CPT

## 2022-06-13 PROCEDURE — 93306 TTE W/DOPPLER COMPLETE: CPT

## 2022-06-13 PROCEDURE — 71045 X-RAY EXAM CHEST 1 VIEW: CPT

## 2022-06-13 PROCEDURE — 85027 COMPLETE CBC AUTOMATED: CPT

## 2022-06-13 PROCEDURE — 84540 ASSAY OF URINE/UREA-N: CPT

## 2022-06-13 PROCEDURE — 99358 PROLONG SERVICE W/O CONTACT: CPT | Mod: NC

## 2022-06-13 PROCEDURE — 85025 COMPLETE CBC W/AUTO DIFF WBC: CPT

## 2022-06-13 PROCEDURE — 83880 ASSAY OF NATRIURETIC PEPTIDE: CPT

## 2022-06-13 PROCEDURE — 99497 ADVNCD CARE PLAN 30 MIN: CPT | Mod: 25

## 2022-06-13 PROCEDURE — 99239 HOSP IP/OBS DSCHRG MGMT >30: CPT

## 2022-06-13 PROCEDURE — 80053 COMPREHEN METABOLIC PANEL: CPT

## 2022-06-13 PROCEDURE — 99223 1ST HOSP IP/OBS HIGH 75: CPT

## 2022-06-13 PROCEDURE — 83735 ASSAY OF MAGNESIUM: CPT

## 2022-06-13 RX ORDER — METOPROLOL TARTRATE 50 MG
25 TABLET ORAL DAILY
Refills: 0 | Status: DISCONTINUED | OUTPATIENT
Start: 2022-06-13 | End: 2022-06-13

## 2022-06-13 RX ADMIN — APIXABAN 2.5 MILLIGRAM(S): 2.5 TABLET, FILM COATED ORAL at 08:55

## 2022-06-13 RX ADMIN — Medication 20 MILLIGRAM(S): at 14:59

## 2022-06-13 RX ADMIN — Medication 25 MILLIGRAM(S): at 13:02

## 2022-06-13 RX ADMIN — Medication 25 MICROGRAM(S): at 06:14

## 2022-06-13 RX ADMIN — AMIODARONE HYDROCHLORIDE 200 MILLIGRAM(S): 400 TABLET ORAL at 06:14

## 2022-06-13 NOTE — DISCHARGE NOTE NURSING/CASE MANAGEMENT/SOCIAL WORK - PATIENT PORTAL LINK FT
Department of Psychiatry  Attending Progress Note  Chief Complaint: depressed  Gale Fong is not sleeping well evenn with trazodone. She is up frequently even though her autistic daughter is not in  the home. She is not able to control her anxiety well and clonazepam is not helpful. She is not having SI but feels depressed and sleep deprived. She has had 3 hours per night for 6 nights. Patient's chart was reviewed and collaborated with  about the treatment plan. SUBJECTIVE:    Patient is feeling unchanged. Suicidal ideation:  denies suicidal ideation. Patient does not have medication side effects. ROS: Patient has new complaints: no  Sleeping adequately:  No:    Appetite adequate: Yes  Attending groups: Yes  Visitors:Yes    OBJECTIVE    Physical  VITALS:  LMP 03/04/2019 (Approximate)     Mental Status Examination:  Patients appearance was street clothes. Thoughts are Goal directed. Homicidal ideations none. No abnormal movements, tics or mannerisms. Memory intact Aims 0. Concentration Good. Alert and oriented X 4. Insight and Judgement impaired insight. Patient was cooperative. Patient gait normal. Mood constricted, decreased range and depressed, affect depressed affect Hallucinations Absent, suicidal ideations no specific plan to harm self Speech normal volume  Data  Labs:   No visits with results within 2 Day(s) from this visit.    Latest known visit with results is:   Admission on 03/20/2019, Discharged on 03/20/2019   Component Date Value Ref Range Status    WBC 03/20/2019 9.1  4.0 - 11.0 K/uL Final    RBC 03/20/2019 5.14  4.00 - 5.20 M/uL Final    Hemoglobin 03/20/2019 15.0  12.0 - 16.0 g/dL Final    Hematocrit 03/20/2019 45.4  36.0 - 48.0 % Final    MCV 03/20/2019 88.3  80.0 - 100.0 fL Final    MCH 03/20/2019 29.2  26.0 - 34.0 pg Final    MCHC 03/20/2019 33.1  31.0 - 36.0 g/dL Final    RDW 03/20/2019 13.6  12.4 - 15.4 % Final    Platelets 05/92/6362 237  135 - 450 K/uL Final    MPV 03/20/2019 8.7  5.0 - 10.5 fL Final    Neutrophils % 03/20/2019 65.3  % Final    Lymphocytes % 03/20/2019 27.7  % Final    Monocytes % 03/20/2019 5.5  % Final    Eosinophils % 03/20/2019 1.1  % Final    Basophils % 03/20/2019 0.4  % Final    Neutrophils # 03/20/2019 5.9  1.7 - 7.7 K/uL Final    Lymphocytes # 03/20/2019 2.5  1.0 - 5.1 K/uL Final    Monocytes # 03/20/2019 0.5  0.0 - 1.3 K/uL Final    Eosinophils # 03/20/2019 0.1  0.0 - 0.6 K/uL Final    Basophils # 03/20/2019 0.0  0.0 - 0.2 K/uL Final    Sodium 03/20/2019 141  136 - 145 mmol/L Final    Potassium reflex Magnesium 03/20/2019 3.9  3.5 - 5.1 mmol/L Final    Chloride 03/20/2019 101  99 - 110 mmol/L Final    CO2 03/20/2019 26  21 - 32 mmol/L Final    Anion Gap 03/20/2019 14  3 - 16 Final    Glucose 03/20/2019 123* 70 - 99 mg/dL Final    BUN 03/20/2019 7  7 - 20 mg/dL Final    CREATININE 03/20/2019 <0.5* 0.6 - 1.1 mg/dL Final    GFR Non- 03/20/2019 >60  >60 Final    Comment: >60 mL/min/1.73m2 EGFR, calc. for ages 25 and older using the  MDRD formula (not corrected for weight), is valid for stable  renal function.  GFR  03/20/2019 >60  >60 Final    Comment: Chronic Kidney Disease: less than 60 ml/min/1.73 sq.m. Kidney Failure: less than 15 ml/min/1.73 sq.m. Results valid for patients 18 years and older.       Calcium 03/20/2019 8.9  8.3 - 10.6 mg/dL Final    Total Protein 03/20/2019 7.5  6.4 - 8.2 g/dL Final    Alb 03/20/2019 4.0  3.4 - 5.0 g/dL Final    Albumin/Globulin Ratio 03/20/2019 1.1  1.1 - 2.2 Final    Total Bilirubin 03/20/2019 0.7  0.0 - 1.0 mg/dL Final    Alkaline Phosphatase 03/20/2019 60  40 - 129 U/L Final    ALT 03/20/2019 32  10 - 40 U/L Final    AST 03/20/2019 32  15 - 37 U/L Final    Globulin 03/20/2019 3.5  g/dL Final    Lipase 03/20/2019 19.0  13.0 - 60.0 U/L Final    Color, UA 03/20/2019 YELLOW  Straw/Yellow Final    Clarity, UA 03/20/2019 CLOUDY* Clear Final  Glucose, Ur 03/20/2019 >=1000* Negative mg/dL Final    Bilirubin Urine 03/20/2019 Negative  Negative Final    Ketones, Urine 03/20/2019 40* Negative mg/dL Final    Specific Honeydew, UA 03/20/2019 >1.030  1.005 - 1.030 Final    Blood, Urine 03/20/2019 Negative  Negative Final    pH, UA 03/20/2019 5.5  5.0 - 8.0 Final    Protein, UA 03/20/2019 Negative  Negative mg/dL Final    Urobilinogen, Urine 03/20/2019 0.2  <2.0 E.U./dL Final    Nitrite, Urine 03/20/2019 Negative  Negative Final    Leukocyte Esterase, Urine 03/20/2019 Negative  Negative Final    Microscopic Examination 03/20/2019 YES   Final    Urine Reflex to Culture 03/20/2019 Yes   Final    Urine Type 03/20/2019 Not Specified   Final    hCG Qual 03/20/2019 Negative  Detects HCG level >10 MIU/mL Final    Ventricular Rate 03/20/2019 76  BPM Final    Atrial Rate 03/20/2019 76  BPM Final    P-R Interval 03/20/2019 156  ms Final    QRS Duration 03/20/2019 80  ms Final    Q-T Interval 03/20/2019 386  ms Final    QTc Calculation (Bazett) 03/20/2019 434  ms Final    P Axis 03/20/2019 32  degrees Final    R Axis 03/20/2019 0  degrees Final    T Axis 03/20/2019 -1  degrees Final    Diagnosis 03/20/2019 Normal sinus rhythmNormal ECGNo significant changeConfirmed by Satish Babin MD, Lynn Rainey ([id number]) on 3/20/2019 10:11:35 AM   Final    Troponin 03/20/2019 <0.01  <0.01 ng/mL Final    Methodology by Troponin T    RBC, UA 03/20/2019 0-2  0 - 2 /HPF Final    Bacteria, UA 03/20/2019 1+* /HPF Final    Yeast, UA 03/20/2019 Present* /HPF Final    Hyaline Casts, UA 03/20/2019 2  0 - 8 /LPF Final    WBC, UA 03/20/2019 6* 0 - 5 /HPF Final    Epi Cells 03/20/2019 10* 0 - 5 /HPF Final    Comment: Urinalysis microscopic performed using the  automated methodology (AUWI analyzer).  Urine Culture, Routine 03/20/2019 >50,000 CFU/ml mixed skin/urogenital rona.  No further workup   Final            Medications  No current facility-administered medications for You can access the FollowMyHealth Patient Portal offered by [facility] by registering at the following website: [email or web address]. By joining [facility]’s FollowMyHealth portal, you will also be able to view your health information using other applications (apps) compatible with our system. this encounter. ASSESSMENT AND PLAN    Active Problems:    * No active hospital problems. *  Resolved Problems:    * No resolved hospital problems. *       1. Patient s symptoms   show no change. Add Buspar 10 mg BID #60 to pharmacy . DC Clonazepam, and increase Trazodone 100 mg HS. Continue with Abilify 10 mg HS  2. Probable discharge is 3-4 weeks  3. Discharge planning is incomplete  4. Suicidal ideation is better  5. Total time with patient was 40 minutes and more than 50 % of that time was spent counseling the patient on their symptoms, treatment and expected goals.

## 2022-06-13 NOTE — CONSULT NOTE ADULT - SUBJECTIVE AND OBJECTIVE BOX
[facility] Geriatrics and Palliative Care  Kendell Proctor, Palliative Care Attending  Contact Info: Call [phone] (HEAL Line) or message on Microsoft Teams (Kendell Proctor)    HPI:  89 F with PMHx HFpEF with sever MR s/p clip, a fib s/p DCC, HTN, breast and rectal cancer in remission, active MM presented to [facility] for recurrent episodes of weakness and loss of balance. She has been getting these episodes that typically last about a minute but take an hour or two to recover for the past 1-2 years. They typically come once a month; however, the patient's partner Kike describes that most days she has a low grade lightheadedness spell in the morning after breakfast. Her symptoms are worst when she stands quickly, and there is no accompanying vertigo, palpitations, tunnel vision, chest pain, or dyspnea. She reports having a near fall about once a month, but has had 2 episodes in the past week: on 6/9/22 when she also had floaters in her L visual field for 2 minutes as well as unsteadiness and on day of admission when she felt weak and unsteady. Has been following closely with Dr. Dhara NG on torsemide 20. She was taking double doses 5/31-6/5 which helped with her edema and did not trigger a lightheadedness episode -- resumed normal dosing 3 days before her 6/9 episode.      PERTINENT PM/SXH:   Atrial fibrillation  Malignant neoplasm of anus  Malignant neoplasm of female breast  Multiple myeloma  No significant past surgical history    FAMILY HISTORY:  No pertinent family history in first degree relatives    No history of  in first degree relatives according to chart  Unable to obtain due to patient's encephalopathy                                                                                         ITEMS NOT CHECKED ARE NOT PRESENT  SOCIAL HISTORY:   Significant other/partner:  []  Children:  []   Substance hx:  []   Tobacco hx:  []   Alcohol hx: []   Home Opioid hx:  [] I-Stop Reference No:  - no active Rx's / see chart note  Living Situation: []Home  []Long term care  []Rehab []Other  Scientology/Spiritual practice: ; Role of organized Zoroastrian [ ] important [ ] some [ ] unable to assess  Coping: [ ] well [ ] with difficulty [ ] poor coping [ ] unable to assess  Support system: [ ] strong [ ] adequate [ ] inadequate    ADVANCE DIRECTIVES:    []MOLST  []Living Will  DECISION MAKER(s):  [] Health Care Proxy(s)  [] Surrogate(s)  [] Guardian           Name(s)/Phone Number(s):     BASELINE (I)ADLs (prior to admission):  [place]: []Total  [] Moderate []Dependent    ALLERGIES:  No Known Allergies    MEDICATIONS  (STANDING):  aMIOdarone    Tablet 200 milliGRAM(s) Oral every 24 hours  apixaban 2.5 milliGRAM(s) Oral every 12 hours  atorvastatin 40 milliGRAM(s) Oral at bedtime  levothyroxine 25 MICROGram(s) Oral every 24 hours  metoprolol succinate ER 25 milliGRAM(s) Oral daily  torsemide 20 milliGRAM(s) Oral every 24 hours    MEDICATIONS  (PRN):  acetaminophen     Tablet .. 650 milliGRAM(s) Oral every 6 hours PRN Temp greater or equal to 38C (100.4F), Mild Pain (1 - 3)  melatonin 3 milliGRAM(s) Oral at bedtime PRN Insomnia    Analgesic Use (Scheduled and PRNs) for past 24 hours:      PRESENT SYMPTOMS/REVIEW OF SYSTEMS: []Unable to obtain due to poor mentation/encephalopathy  Source if other than patient:  []Family   []Team     Pain: [ ] yes [ ] no  QOL Impact -   Location -                    Aggravating Factors -  Quality -  Radiation -  Timing -  Severity (0-10 scale) -   Minimal Acceptable Level (0-10 scale) -    CPOT Score:  (Pain Assessment Scale for Critically Ill)    PAIN AD Score:  (Nonverbal Pain Assessment Scale)    Dyspnea:                           []Mild  []Moderate []Severe  Anxiety:                             []Mild []Moderate []Severe  Fatigue:                             []Mild []Moderate []Severe  Nausea:                             []Mild []Moderate []Severe  Loss of Appetite:              []Mild []Moderate []Severe  Constipation:                    []Mild []Moderate []Severe    Other Symptoms:  [x]All Other Review Of Systems Negative     Palliative Performance Status Version 2:  %  (Functional Assessment Tool)    PHYSICAL EXAM:  GENERAL:  []Alert  []Oriented x   []Lethargic  []Cachexia  []Unarousable  []Verbal  []Non-Verbal  Behavioral:   []Anxiety  []Delirium []Agitation []Cooperative  HEENT:  []Normal   []Dry mouth   []ET Tube/Trach  []Oral lesions  PULMONARY:   []Clear []Tachypnea  []Audible excessive secretions   []Rhonchi        []Right []Left []Bilateral  []Crackles        []Right []Left []Bilateral  []Wheezing     []Right []Left []Bilateral  CARDIOVASCULAR:    []Regular []Irregular []Tachy  []Philippe []Murmur []Other  GASTROINTESTINAL:  []Soft  []Distended   []+BS  []Non tender []Tender  []PEG []OGT/ NGT  Last BM:  GENITOURINARY:  []Normal [] Incontinent   []Oliguria/Anuria   []Graham  MUSCULOSKELETAL:   []Normal   []Weakness  []Bed/Wheelchair bound []Edema  NEUROLOGIC:   []No focal deficits  []Cognitive impairment  []Dysphagia []Dysarthria []Paresis []Encephalopathic   SKIN:   []Normal   []Pressure ulcer(s)  []Rash    CRITICAL CARE:  [ ]Shock Present  [ ]Septic [ ]Cardiogenic [ ]Neurologic [ ]Hypovolemic  [ ]Vasopressors [ ]Inotropes   [ ]Respiratory failure present [ ]Mechanical Ventilation [ ]Non-invasive ventilatory support [ ]High-Flow  [ ]Acute  [ ]Chronic [ ]Hypoxic  [ ]Hypercarbic  [ ]Other organ failure    Vital Signs Last 24 Hrs  T(C): 36.6 (13 Jun 2022 13:06), Max: 36.9 (13 Jun 2022 05:32)  T(F): 97.8 (13 Jun 2022 13:06), Max: 98.4 (13 Jun 2022 05:32)  HR: 60 (13 Jun 2022 13:06) (60 - 65)  BP: 149/68 (13 Jun 2022 13:06) (115/61 - 149/68)  BP(mean): --  RR: 18 (13 Jun 2022 13:06) (18 - 19)  SpO2: 98% (13 Jun 2022 13:06) (94% - 98%)    LABS:                        11.0   8.72  )-----------( 269      ( 13 Jun 2022 05:30 )             35.0   06-13    142  |  107  |  19  ----------------------------<  86  4.0   |  25  |  1.23    Ca    8.8      13 Jun 2022 05:30  Phos  3.2     06-13  Mg     2.3     06-13    TPro  7.4  /  Alb  3.6  /  TBili  0.6  /  DBili  x   /  AST  21  /  ALT  14  /  AlkPhos  139<H>  06-12dddd    RADIOLOGY & ADDITIONAL STUDIES:      REFERRALS:  [x]Social Work  []Case management []PT/OT []Chaplaincy  []Hospice  []Patient/Family Support []Massage Therapy    DISCUSSION OF CASE: Family - to obtain additional history and to provide emotional support;  -     Care Coordination/Goals of Care Document:   PdALLIATIVE MEDICINE COORDINATION OF CARE DOCUMENTATION: [x] Inpatient Consult  Non-Face-to-Face prolonged service provided that relates to (face-to-face) care that has or will occur and ongoing patient management, including one or more of the following: - Reviewed documentation from other physicians and other health care professional services - Reviewed medical records and diagnostic / radiology study results - Coordination with patient's support system  ************************************************************************  MEDICATION REVIEW:  - See Medication List Above    ISTOP REFERENCE:   - no active Rx's / see ISTOP Chart Note  - PRN usage: See 24 Hour Use Above  ------------------------------------------------------------------------  COORDINATION OF CARE:  - Palliative Care consulted for: GOC / Symptom Management  - Patient (to be) assessed:  - Patient previously seen by Palliative Care service: NO    ADVANCE CARE PLANNING  - Code status:  - MOLST reviewed in chart: NONE; None found on Alpha  - GOC documents: NONE found on [place]  - HCP/Living will/Other Advanced Directives in Alpha: NONE found on Alpha  ------------------------------------------------------------------------  CARE PROVIDER DOCUMENTATION:  - SW/CM notes: Remains medically active    PLAN OF CARE  - Current Admit Date: 6/11/22  - LOS: 2  - LACE score: 10  - Current Dispo Plan: HOME  ------------------------------------------------------------------------  - Time Spent/Chart reviewed: 31 Minutes [including time used to gather, review and transfer data]  - Start: 12:30PM  - End: 1:01PM  Prolonged services rendered, as part of this patient's care provided by Palliative Medicine, include: i. chart review for provider and ancillary service documentation, ii. pertinent diagnostics including laboratory and imaging studies, iii. medication review including PRN use, iv. admission history including previous palliative care encounters and GOC notes, v. advance care planning documents including HCP and MOLST forms in Alpha. Part of Palliative Medicine extended evaluation and management also involves coordination of care with our IDT, the primary and consulting teams, and unit CM/SW and Hospice if eligible. Recommendations based on the information gathered and discussed are outlined in the A/P of Palliative notes. [facility] Geriatrics and Palliative Care  Kendell Proctor, Palliative Care Attending  Contact Info: Call [phone] (HEAL Line) or message on Microsoft Teams (Kendell Proctor)    HPI:  89 F with PMHx HFpEF with sever MR s/p clip, a fib s/p DCC, HTN, breast and rectal cancer in remission, active MM presented to [facility] for recurrent episodes of weakness and loss of balance. She has been getting these episodes that typically last about a minute but take an hour or two to recover for the past 1-2 years. They typically come once a month; however, the patient's partner Kike describes that most days she has a low grade lightheadedness spell in the morning after breakfast. Her symptoms are worst when she stands quickly, and there is no accompanying vertigo, palpitations, tunnel vision, chest pain, or dyspnea. She reports having a near fall about once a month, but has had 2 episodes in the past week: on 6/9/22 when she also had floaters in her L visual field for 2 minutes as well as unsteadiness and on day of admission when she felt weak and unsteady. Has been following closely with Dr. Dhara NG on torsemide 20. She was taking double doses 5/31-6/5 which helped with her edema and did not trigger a lightheadedness episode -- resumed normal dosing 3 days before her 6/9 episode.    Patient seen and examined at bedside. Appears overtly comfortable. Denies any significant complaint. Comprehensive symptom assessment and GOC exploration as noted below. Further collateral obtained from primary team, chart, and family. Asking when she can go home. Significant other states patient has had some functional decline over the past few years but nothing dramatic. Assumes it is expected with advancing age and myeloma diagnosis. Patient still travels to [place] at least 3x/year to be with significant other.    PERTINENT PM/SXH:   Atrial fibrillation  Malignant neoplasm of anus  Malignant neoplasm of female breast  Multiple myeloma  No significant past surgical history    FAMILY HISTORY:  No pertinent family history of CVA in first degree relatives               ITEMS NOT CHECKED ARE NOT PRESENT  SOCIAL HISTORY:   Significant other/partner:  [x]  Children:  []   Substance hx:  []   Tobacco hx:  []   Alcohol hx: []   Home Opioid hx:  [] I-Stop Reference No: [id number]  - no active Rx's  Living Situation: [x]Home  []Long term care  []Rehab []Other  Coping: [x] well [ ] with difficulty [ ] poor coping [ ] unable to assess  Support system: [x] strong [ ] adequate [ ] inadequate    ADVANCE DIRECTIVES:    []MOLST  []Living Will  DECISION MAKER(s):  [x] Health Care Proxy(s)  [] Surrogate(s)  [] Guardian           Name(s)/Phone Number(s): Elizabeth Graham, [phone]    BASELINE (I)ADLs (prior to admission):  [place]: [x]Total  [] Moderate []Dependent    ALLERGIES:  No Known Allergies    MEDICATIONS  (STANDING):  aMIOdarone    Tablet 200 milliGRAM(s) Oral every 24 hours  apixaban 2.5 milliGRAM(s) Oral every 12 hours  atorvastatin 40 milliGRAM(s) Oral at bedtime  levothyroxine 25 MICROGram(s) Oral every 24 hours  metoprolol succinate ER 25 milliGRAM(s) Oral daily  torsemide 20 milliGRAM(s) Oral every 24 hours    MEDICATIONS  (PRN):  acetaminophen     Tablet .. 650 milliGRAM(s) Oral every 6 hours PRN Temp greater or equal to 38C (100.4F), Mild Pain (1 - 3)  melatonin 3 milliGRAM(s) Oral at bedtime PRN Insomnia    PRESENT SYMPTOMS/REVIEW OF SYSTEMS: []Unable to obtain due to poor mentation/encephalopathy  Source if other than patient:  []Family   []Team     Pain: [ ] yes [x] no  QOL Impact -   Location -                    Aggravating Factors -  Quality -  Radiation -  Timing -  Severity (0-10 scale) -   Minimal Acceptable Level (0-10 scale) -    Dyspnea:                           [x]Mild  []Moderate []Severe  Anxiety:                             []Mild []Moderate []Severe  Fatigue:                             []Mild []Moderate []Severe  Nausea:                             []Mild []Moderate []Severe  Loss of Appetite:              []Mild []Moderate []Severe  Constipation:                    []Mild []Moderate []Severe    Other Symptoms:  [x]All Other Review Of Systems Negative     Palliative Performance Status Version 2:  70%  (Functional Assessment Tool)    PHYSICAL EXAM:  GENERAL:  [x]Alert  [x]Oriented x3   []Lethargic  []Cachexia  []Unarousable  [x]Verbal  []Non-Verbal  Behavioral:   []Anxiety  []Delirium []Agitation [x]Cooperative  HEENT:  [x]Normal   []Dry mouth   []ET Tube/Trach  []Oral lesions  PULMONARY:   [x]Clear []Tachypnea  []Audible excessive secretions   []Rhonchi        []Right []Left []Bilateral  []Crackles        []Right []Left []Bilateral  []Wheezing     []Right []Left []Bilateral  CARDIOVASCULAR:    [x]Regular []Irregular []Tachy  []Philippe []Murmur []Other  GASTROINTESTINAL:  [x]Soft  []Distended   [x]+BS  [x]Non tender []Tender  []PEG []OGT/ NGT  Last BM:  GENITOURINARY:  [x]Normal [] Incontinent   []Oliguria/Anuria   []Graham  MUSCULOSKELETAL:   [x]Normal   []Weakness  []Bed/Wheelchair bound []Edema  NEUROLOGIC:   [x]No focal deficits  []Cognitive impairment  []Dysphagia []Dysarthria []Paresis []Encephalopathic   SKIN:   [x]Normal   []Pressure ulcer(s)  []Rash    Vital Signs Last 24 Hrs  T(C): 36.6 (13 Jun 2022 13:06), Max: 36.9 (13 Jun 2022 05:32)  T(F): 97.8 (13 Jun 2022 13:06), Max: 98.4 (13 Jun 2022 05:32)  HR: 60 (13 Jun 2022 13:06) (60 - 65)  BP: 149/68 (13 Jun 2022 13:06) (115/61 - 149/68)  BP(mean): --  RR: 18 (13 Jun 2022 13:06) (18 - 19)  SpO2: 98% (13 Jun 2022 13:06) (94% - 98%)    LABS:                        11.0   8.72  )-----------( 269      ( 13 Jun 2022 05:30 )             35.0   06-13    142  |  107  |  19  ----------------------------<  86  4.0   |  25  |  1.23    Ca    8.8      13 Jun 2022 05:30  Phos  3.2     06-13  Mg     2.3     06-13    TPro  7.4  /  Alb  3.6  /  TBili  0.6  /  DBili  x   /  AST  21  /  ALT  14  /  AlkPhos  139<H>  06-12dddd    RADIOLOGY & ADDITIONAL STUDIES:  < from: MR Head w/wo IV Cont (06.12.22 @ 15:42) >  The MRI examination of the brain demonstrates the ventricles,   cisternal spaces, and cortical sulci to be appropriate for the patient's   stated age. There is mild ventricular prominence most likely related to   volume loss. There is no midline shift or extra-axial collection. There   are multiple foci of increased FLAIR signal involving the periventricular   and subcortical white matter as well as the vasile most likely related to   chronic ischemic microangiopathic disease. The diffusion-weighted images   demonstrate no acute ischemia. The SWI images demonstrate several small   foci of decreased signal in the centrum semiovale/corona radiata. No   abnormal enhancement is seen. There is normal vascular flow-voids. The   visualized paranasal sinuses are free of mucosal disease. The mastoid air   cells are well-aerated.    IMPRESSION: No abnormal enhancement to suggest intracranial metastases.  Several small foci of decreased signal on SWI images in the centrum   semiovale/corona radiata probably related to old blood products.  No evidence of acute ischemia.    REFERRALS:  [x]Social Work  []Case management [x]PT/OT []Chaplaincy  []Hospice  []Patient/Family Support []Massage Therapy    DISCUSSION OF CASE: Partner - to obtain additional history and to provide emotional support; Medicine - to discuss discharge plan    Care Coordination/Goals of Care Document:   PdALLIATIVE MEDICINE COORDINATION OF CARE DOCUMENTATION: [x] Inpatient Consult  Non-Face-to-Face prolonged service provided that relates to (face-to-face) care that has or will occur and ongoing patient management, including one or more of the following: - Reviewed documentation from other physicians and other health care professional services - Reviewed medical records and diagnostic / radiology study results - Coordination with patient's support system  ************************************************************************  MEDICATION REVIEW:  - See Medication List Above    ISTOP REFERENCE:   - no active Rx's / see ISTOP Chart Note  - PRN usage: None  ------------------------------------------------------------------------  COORDINATION OF CARE:  - Palliative Care consulted for: GOC / Symptom Management  - Patient (to be) assessed: 6/13/22  - Patient previously seen by Palliative Care service: NO    ADVANCE CARE PLANNING  - Code status: DNR/DNI  - MOLST reviewed in chart: COMPLETED; None found on Alpha  - GOC documents: NONE found on [place]  - HCP/Living will/Other Advanced Directives in Alpha: NONE found on Alpha  ------------------------------------------------------------------------  CARE PROVIDER DOCUMENTATION:  - SW/CM notes: Remains medically active    PLAN OF CARE  - Current Admit Date: 6/11/22  - LOS: 2  - LACE score: 10  - Current Dispo Plan: HOME  ------------------------------------------------------------------------  - Time Spent/Chart reviewed: 31 Minutes [including time used to gather, review and transfer data]  - Start: 12:30PM  - End: 1:01PM  Prolonged services rendered, as part of this patient's care provided by Palliative Medicine, include: i. chart review for provider and ancillary service documentation, ii. pertinent diagnostics including laboratory and imaging studies, iii. medication review including PRN use, iv. admission history including previous palliative care encounters and GOC notes, v. advance care planning documents including HCP and MOLST forms in Alpha. Part of Palliative Medicine extended evaluation and management also involves coordination of care with our IDT, the primary and consulting teams, and unit CM/SW and Hospice if eligible. Recommendations based on the information gathered and discussed are outlined in the A/P of Palliative notes.

## 2022-06-13 NOTE — DISCHARGE NOTE NURSING/CASE MANAGEMENT/SOCIAL WORK - NSDCPEFALRISK_GEN_ALL_CORE
For information on Fall & Injury Prevention, visit: [email or web address] OR  [email or web address] OR  https://www.cdc.gov/steadi/patient.html

## 2022-06-13 NOTE — CONSULT NOTE ADULT - PROBLEM SELECTOR RECOMMENDATION 2
.  No acute exacerbation  -recent echo with lower pulmonary artery pressures than previous  -normal LV/RV systolic function

## 2022-06-13 NOTE — CONSULT NOTE ADULT - PROBLEM SELECTOR RECOMMENDATION 5
.  Complex medical decision making / symptom management in the setting of advancing age and malignancy.    Palliative Medicine will SIGN OFF, please reconsult for future readmissions. Emotional support provided, questions answered.  Active Psychosocial Referrals:  [x]Social Work/Case management [x]PT/OT []Chaplaincy []Hospice  []Patient/Family Support []Holistic RN []Massage Therapy []Ethics  Coping: [x] well [] with difficulty [] poor coping [] unable to assess  Support system: [x] strong [] adequate [] inadequate    For new or uncontrolled symptoms, please call Palliative Care at [phone]. The service is available 24/7 (including nights & weekends) to provide symptom management recommendations over the phone as appropriate

## 2022-06-13 NOTE — PROGRESS NOTE ADULT - PROBLEM SELECTOR PLAN 5
Follows with Dr. Benito Giron.
Follows with Dr. Benito Giron, taking Velicaid weekly and an infusion every 6 weeks for bone health (bisphosphonate?)  - c/w velicaid, letrozole, infusion as outpatient  - f/u outpatient

## 2022-06-13 NOTE — PROGRESS NOTE ADULT - PROBLEM SELECTOR PLAN 4
- c/w metoprolol succinate 25mg PO QD, amiodarone 200mg PO QD
D/sp [facility] 5/2021 on amiodarone, metoprolol, eliquis  - c/w amiodarone, eliquis

## 2022-06-13 NOTE — PROGRESS NOTE ADULT - PROBLEM SELECTOR PLAN 3
Admission Cr 1.58; her baseline has fluctuated for the past year but in 2021 she was 1.22 so this may be a mild DAVID secondary to torsemide vs progression of CKD. Follows with Dr. Segovia.  - Given heart failure and edema, observe off IVF  - urine electrolytes  - trend Cr
RESOLVED

## 2022-06-13 NOTE — CONSULT NOTE ADULT - PROBLEM SELECTOR RECOMMENDATION 3
.  Actively receiving treatment, overtly tolerating  -if no further DMT were offered/pursued, then patient would be eligible for home hospice

## 2022-06-13 NOTE — PHYSICAL THERAPY INITIAL EVALUATION ADULT - ADDITIONAL COMMENTS
Pt lives in an elevator access apartment and has friend that will be staying with her for 6 weeks. Pt denies use of AD/DME prior to admission. Pt has been independent with ADLs prior to admission.

## 2022-06-13 NOTE — PROGRESS NOTE ADULT - PROBLEM SELECTOR PLAN 2
Longstanding Hx with recent unchanged TTE July 2021 in [place] with another manually input 8/10/21 TTE showing severe MR. Her MR may be contributing to her inability to mount an appropriate alpha agonist response to change in position.  - diuretic/BB as above  - pro-BNP  - c/w atorvastatin 40  - limited TTE  - f/u with Dr. Jules
Longstanding Hx with recent unchanged TTE July 2021 in [place] with another manually input 8/10/21 TTE showing severe MR. Her MR may be contributing to her inability to mount an appropriate alpha agonist response to change in position.  - c/w metoprolol succinate 25mg PO QD, amiodarone 200mg PO QD, Torsemide 20mg PO QD  - f/u with Dr. Jules

## 2022-06-13 NOTE — CONSULT NOTE ADULT - ASSESSMENT
·	MOLST completed with DNR/DNI; original given to patient and copy left in her chart  ·	remains physically active and relatively independent, home hospice referral is not within GOC 89yo F with PMH of Multiple Myeloma (on active treatment), Rectal CA (in remission), Breast CA (in remission), HFpEF, and AFib p/w episodes of weakness and found to have orthostatic hypotension. Palliative consulted for complex medical decision making in the setting of life-limiting illness.    ·	MOLST completed with DNR/DNI; original given to patient and copy left in her chart  ·	remains physically active and relatively independent, home hospice referral is not within GOC  ·	patient is hop[eful to be discharged this afternoon

## 2022-06-13 NOTE — PHYSICAL THERAPY INITIAL EVALUATION ADULT - PERSONAL SAFETY AND JUDGMENT, REHAB EVAL
7 -9 YEAR WELL CHILD CHECK    Ale Kwan is a 7 year old female who presents for well child exam.  Patient is accompanied by Dad and 1 younger sister..    Language: [x]English []Other:  []  used:    Concerns raised today include: She has dysuria and has been wetting the bed at night. Dad would like her evaluated for ADHD and he said she has a hard focusing on her virtual school work.    PERSONAL/SOCIAL HISTORY:  Lives with: Dad and 1 younger sister.  Smoke exposure? No    Pets? Dog Veterinary care/vaccinated? Yes  School attends: [employer] through [employer]   Sports? Trampoline and swimming    Concussion? No Fracture(s)? No  Severe sprains? No  Other extracurricular activities: playing outside    DIET:  [x] Yes [] No - Dairy 3 servings/day  [x] Yes [] No - Protein > 2 servings/day  [x] Yes [] No - Fruits/vegetables > 3 cups/day  [x] Yes [] No - Whole grains  [x] Yes [] No - Sweetened beverages < 8 ounces/day  [x] Yes [] No - Brush teeth daily/BID and regular dental checks?    SAFETY: Does he/she  [x] Yes [] No - Have adult supervision before and after school?  [x] Yes [] No - Use a seatbelt in the car?  [x] Yes [] No - Wear a helmet for biking, skating, etc.?  [x] Yes [] No - Does home have working smoke detectors?  [x] Yes [] No - Live in a gun safe home?  [x] Yes [] No - Do you feel safe at home?  [x] Yes [] No - Have you talked to him/her about drugs, alcohol and smoking avoidance?    No past medical history on file.  No past surgical history on file.  No family history on file.  Current Outpatient Medications   Medication Sig Dispense Refill   • sulfamethoxazole-trimethoprim (BACTRIM) 200-40 MG/5ML suspension Take 10 mLs by mouth 2 times daily for 10 days. 200 mL 0     No current facility-administered medications for this visit.     ALLERGIES:  No Known Allergies    Immunizations:  Up to date? Yes     PHYSICAL EXAMINATION: (Check if normal, discuss abnormals)  Blood pressure 98/72, pulse  120, resp. rate 20, height 4' 1\" (1.245 m), weight 24.5 kg.Body mass index is 15.81 kg/m².  Growth Curve Parameters: 42 %ile (Z= -0.21) based on [facility] (Girls, 2-20 Years) weight-for-age data using vitals from 4/7/2021. 32 %ile (Z= -0.46) based on [facility] (Girls, 2-20 Years) Stature-for-age data based on Stature recorded on 4/7/2021. 51 %ile (Z= 0.02) based on [facility] (Girls, 2-20 Years) BMI-for-age based on BMI available as of 4/7/2021.  General appearance [x]   Head/Face [x]   Neck  [x]   Teeth  [x]  Eyes  [x]    Bilateral red reflex  [x] Cover/uncover [x] Hirschbergs  [x]  Ears  [x]   Nose  [x]   Throat  [x]   Cardiovascular [x]   Respiratory [x]    Chest (breasts)[x]  Abdomen/Gastrointestinal [x]   External Genitalia [x]   Francesco stage: female [x] I [] 2 [] 3 [] 4 [] 5      Hair   [x] I [] 2 [] 3 [] 4 [] 5  Lymphatic [x]  Skin  [x]   Back  [x]   Extremities [x]   Neurologic/Psychiatric [x]     SCREENING TESTS:    Hearing Screening    125Hz 250Hz 500Hz 1000Hz 2000Hz 3000Hz 4000Hz 6000Hz 8000Hz   Right ear:   Pass Pass Pass  Pass     Left ear:   Pass Pass Pass  Pass        Visual Acuity Screening    Right eye Left eye Both eyes   Without correction:   20 20   With correction:           ASSESSMENT:  7 year old female well child.  UTI  PLAN:  Start the Bactrim today and I will call with the culture results.  All parental concerns and questions discussed.    EDUCATION/ANTICIPATORY GUIDANCE:  [x] Development, Behavior, Individuality, Encouragement  [x] Safety/accident prevention  [x] Sun exposure  [x] Diet  [x] Limit television/screen time  [x] Chores/allowance/responsibility  [x] Acetaminophen/ibuprofen dosing    IMMUNIZATIONS GIVEN/LABS ORDERED:  [x] Immunizations up to date   [] Hepatitis A  [] Influenza  [] Other:  [] Parent/guardian(s) was (were) counseled about each component of the vaccines, including possible side effects, asked if they had questions regarding any of today's vaccines and concerns addressed. Verbal  consent given for administration of vaccines for: She is up to date on vaccines  [] Vaccines not given due to:  [x] Other:I gave Dad andrea forms for him and the teacher to fill out and then we can decide on medication or not.    Return to clinic in 1 year for Well Child Exam or sooner prn illness/concerns.     intact

## 2022-06-13 NOTE — CONSULT NOTE ADULT - CONVERSATION DETAILS
Reviewed advance directives related to code status. Patient states that she would want to allow a natural death, does not wish for CPR or Intubation. She is tolerating chemo and would like to continue other indicated medications/interventions to prolong her life. MOLST completed with DNR/DNI.

## 2022-06-13 NOTE — PROGRESS NOTE ADULT - SUBJECTIVE AND OBJECTIVE BOX
SUBJECTIVE/OVERNIGHT EVENTS: No acute overnight events. Pt seen in AM at bedside, resting comfortably in bed, and does not appear to be in any acute distress.     VITAL SIGNS:  Vital Signs Last 24 Hrs  T(C): 36.6 (13 Jun 2022 13:06), Max: 36.9 (13 Jun 2022 05:32)  T(F): 97.8 (13 Jun 2022 13:06), Max: 98.4 (13 Jun 2022 05:32)  HR: 60 (13 Jun 2022 13:06) (60 - 65)  BP: 149/68 (13 Jun 2022 13:06) (115/61 - 149/68)  BP(mean): --  RR: 18 (13 Jun 2022 13:06) (18 - 19)  SpO2: 98% (13 Jun 2022 13:06) (94% - 98%)    PHYSICAL EXAM:  Constitutional: thin elderly woman resting comfortably in bed; NAD  Head: NC  Eyes: PER, EOMI, anicteric sclera  ENT: no nasal discharge; uvula midline, no oropharyngeal erythema or exudates; MMM  Neck: supple; no JVD  Respiratory: CTA B/L; no W/R/R, no retractions; lung sounds distant given habitus  Cardiac: +S1/S2; RRR; no M/R/G  Gastrointestinal: abdomen soft, NT/ND; no rebound or guarding  Extremities: bilateral 1+ edema behind ankles, symmetric, some venous stasis dermatitis on b/l anterior shins  Vascular: 2+ radial, DP pulses B/L  Dermatologic: skin warm, dry and intact; no rashes, wounds, or scars  Neurologic: AAOx3; CNII-XII grossly intact; no focal deficits; moderately hearing impaired; difficulty with word finding and descriptions without aid of partner  Psychiatric: affect and characteristics of appearance, verbalizations, behaviors are appropriate    MEDICATIONS:  MEDICATIONS  (STANDING):  aMIOdarone    Tablet 200 milliGRAM(s) Oral every 24 hours  apixaban 2.5 milliGRAM(s) Oral every 12 hours  atorvastatin 40 milliGRAM(s) Oral at bedtime  levothyroxine 25 MICROGram(s) Oral every 24 hours  metoprolol succinate ER 25 milliGRAM(s) Oral daily    MEDICATIONS  (PRN):  acetaminophen     Tablet .. 650 milliGRAM(s) Oral every 6 hours PRN Temp greater or equal to 38C (100.4F), Mild Pain (1 - 3)  melatonin 3 milliGRAM(s) Oral at bedtime PRN Insomnia      ALLERGIES:  Allergies    No Known Allergies    Intolerances        LABS:                        11.0   8.72  )-----------( 269      ( 13 Jun 2022 05:30 )             35.0     06-13    142  |  107  |  19  ----------------------------<  86  4.0   |  25  |  1.23    Ca    8.8      13 Jun 2022 05:30  Phos  3.2     06-13  Mg     2.3     06-13    TPro  7.4  /  Alb  3.6  /  TBili  0.6  /  DBili  x   /  AST  21  /  ALT  14  /  AlkPhos  139<H>  06-12    PT/INR - ( 11 Jun 2022 16:47 )   PT: 16.7 sec;   INR: 1.40          PTT - ( 11 Jun 2022 16:47 )  PTT:32.0 sec    RADIOLOGY & ADDITIONAL TESTS: Reviewed.

## 2022-06-13 NOTE — PHYSICAL THERAPY INITIAL EVALUATION ADULT - PERTINENT HX OF CURRENT PROBLEM, REHAB EVAL
89 F with PMHx HFpEF with sever MR s/p clip, a fib s/p DCC, HTN, breast and rectal cancer in remission, active MM presented to [facility] for recurrent episodes of weakness and loss of balance. She has been getting these episodes that typically last about a minute but take an hour or two to recover for the past 1-2 years.

## 2022-06-13 NOTE — CONSULT NOTE ADULT - SUBJECTIVE AND OBJECTIVE BOX
Patient is a 89y old  Female who presents with a chief complaint of Orthostatic hypotension (2022 06:53)        HPI:  89 F with PMHx HFpEF with sever MR s/p clip, a fib s/p DCC, HTN, breast and rectal cancer in remission, active MM presented to [facility] for recurrent episodes of weakness and loss of balance. She has been getting these episodes that typically last about a minute but take an hour or two to recover for the past 1-2 years. They typically come once a month; however, the patient's partner Kike describes that most days she has a low grade lightheadedness spell in the morning after breakfast. Her symptoms are worst when she stands quickly, and there is no accompanying vertigo, palpitations, tunnel vision, chest pain, or dyspnea. She reports having a near fall about once a month, but has had 2 episodes in the past week: on 22 when she also had floaters in her L visual field for 2 minutes as well as unsteadiness and on day of admission when she felt weak and unsteady.  Has been following closely with Dr. Dhara NG on torsemide 20. She was taking double doses - which helped with her edema and did not trigger a lightheadedness episode -- resumed normal dosing 3 days before her  episode.    ED Course: 36.6, 74, 122/65, 18, 98%  CXR: rotated, mitral clip  EKG: NSR  Labs b/l anemia 10.8, INR 1.4, Cr 1.58 from 1.22 2020  No interventions given (2022 20:45)      PAST MEDICAL & SURGICAL HISTORY:  Atrial fibrillation  on apixaban      Malignant neoplasm of anus  s/p radiation      Malignant neoplasm of female breast  s/p radiation, lumpectomy       Multiple myeloma      No significant past surgical history          MEDICATIONS  (STANDING):  aMIOdarone    Tablet 200 milliGRAM(s) Oral every 24 hours  apixaban 2.5 milliGRAM(s) Oral every 12 hours  atorvastatin 40 milliGRAM(s) Oral at bedtime  levothyroxine 25 MICROGram(s) Oral every 24 hours  metoprolol succinate ER 25 milliGRAM(s) Oral daily    MEDICATIONS  (PRN):  acetaminophen     Tablet .. 650 milliGRAM(s) Oral every 6 hours PRN Temp greater or equal to 38C (100.4F), Mild Pain (1 - 3)  melatonin 3 milliGRAM(s) Oral at bedtime PRN Insomnia        Social History:                   -  Home Living Status:  lives alone in an elevator accessible apartment building         -  Prior Home Care Services :   none , neighbor assists prn      Baseline Functional Level Prior to Admission :             - ADL's/ IADL's :  independent         - ambulatory status PTA :  walks without devices        FAMILY HISTORY:  No pertinent family history in first degree relatives      CBC Full  -  ( 2022 05:30 )  WBC Count : 8.72 K/uL  RBC Count : 3.96 M/uL  Hemoglobin : 11.0 g/dL  Hematocrit : 35.0 %  Platelet Count - Automated : 269 K/uL  Mean Cell Volume : 88.4 fl  Mean Cell Hemoglobin : 27.8 pg  Mean Cell Hemoglobin Concentration : 31.4 gm/dL  Auto Neutrophil # : x  Auto Lymphocyte # : x  Auto Monocyte # : x  Auto Eosinophil # : x  Auto Basophil # : x  Auto Neutrophil % : x  Auto Lymphocyte % : x  Auto Monocyte % : x  Auto Eosinophil % : x  Auto Basophil % : x      06-13    142  |  107  |  19  ----------------------------<  86  4.0   |  25  |  1.23    Ca    8.8      2022 05:30  Phos  3.2     06-13  Mg     2.3     06-13    TPro  7.4  /  Alb  3.6  /  TBili  0.6  /  DBili  x   /  AST  21  /  ALT  14  /  AlkPhos  139<H>  06-12      Urinalysis Basic - ( 2022 09:03 )    Color: Yellow / Appearance: Clear / S.015 / pH: x  Gluc: x / Ketone: NEGATIVE  / Bili: Negative / Urobili: 0.2 E.U./dL   Blood: x / Protein: NEGATIVE mg/dL / Nitrite: NEGATIVE   Leuk Esterase: NEGATIVE / RBC: x / WBC x   Sq Epi: x / Non Sq Epi: x / Bacteria: x          Radiology :    < from: Xray Chest 1 View AP/PA (22 @ 17:03) >  ACC: [id number] EXAM:  XR CHEST AP OR PA 1V                          PROCEDURE DATE:  2022          INTERPRETATION:  Clinical History: Stroke code    Frontal examination of the chest demonstrates the heart to be within   normal limits in transverse diameter. No acute infiltrates. Hardware   noted overlying left chest. Dextroscoliosis thoracic spine. Mild chronic   interstitial changes. Calcification aortic knob    IMPRESSION: No acute infiltrates        < from: CT Brain Stroke Protocol (22 @ 16:29) >  ACC: [id number] EXAM:  CT BRAIN STROKE PROTOCOL                          PROCEDURE DATE:  2022          INTERPRETATION:  PROCEDURE: CT head without intravenous contrast    CLINICAL INDICATION: Imbalance for 2 weeks, increased today    TECHNIQUE: Axial CT imaging of the brain is obtained with multiplanar   images reviewed and displayed with both bone and soft tissue algorithm.   [employer] software is utilized for preliminary hemorrhage detection.    COMPARISON: 2020    FINDINGS:    There is age-appropriate and similar mild diffuse parenchymal volume loss   with proportionate ventricular, cisternal and sulcal enlargement. No   hydrocephalus, mass effect or midline shift.    No acute intracranial hemorrhage or extra-axial fluid collection.    Gray to white differentiation appears preserved, without CT evidence of   recent transcortical or territorial infarct. Mild confluent   hypoattenuation in periventricular cerebral white matter is stable,   consistent with long-standing small vessel ischemic change.    Bone algorithm images show no calvarial fracture or acute abnormality of   the calvarium or skull base. Paranasal sinuses and mastoids included on   this scan show no acute disease.      IMPRESSION:  No acute intracranial hemorrhage, mass effect or CT evidence of recent   transcortical infarct. No significant change compared to 2020.              Vital Signs Last 24 Hrs  T(C): 36.6 (2022 13:06), Max: 36.9 (2022 05:32)  T(F): 97.8 (2022 13:06), Max: 98.4 (2022 05:32)  HR: 60 (2022 13:06) (60 - 65)  BP: 149/68 (2022 13:06) (115/61 - 149/68)  BP(mean): --  RR: 18 (2022 13:06) (18 - 19)  SpO2: 98% (2022 13:06) (94% - 98%)        REVIEW OF SYSTEMS:  per HPI        Physical Exam:   89 y o  woman lying in semi Nielson's position , awake , alert , no acute complaints , feels weak    Head : normocephalic , atraumatic    Eyes : PERRLA , EOMI , no nystagmus , sclera anicteric    ENT : nasal discharge , uvula midline , no oropharyngeal erythema / exudate    Neck : supple , negative JVD , negative carotid bruits , no thyromegaly    Chest : CTA bilaterally , neg wheeze / rhonchi / rales / crackles / egophany    Cardiovascular: regular rate and rhythm , neg murmurs / rubs / gallops    Abdomen : soft , non distended , non tender to palpation in all 4 quadrants , negative rebound / guarding , normal bowel sounds    Extremities : WWP , neg cyanosis /clubbing / edema      Neurologic Exam:    Alert and oriented to person , age , place , date/year, speech fluent w/o dysarthria , follows commands , recent and remote memory intact , repetition intact , comprehension intact ,  attention/concentration intact , fund of knowledge appropriate    Cranial Nerves:     II :                         pupils equal , round and reactive to light , visual fields intact   III/ IV/VI :              extraocular movements intact , neg nystagmus , neg ptosis  V :                        facial sensation intact , V1-3 normal  VII :                      face symmetric , no droop , normal eye closure and smile  VIII :                     hearing intact to finger rub bilaterally  IX and X :             no hoarseness , gag intact , palate/ uvula rise symmetrically  XI :                       SCM / trapezius strength intact bilateral  XII :                      no tongue deviation    Motor Exam:    Right UE:              no focal weakness ,  > 3+/5 throughout      negative pronator drift                                 Left UE:                 no focal weakness,  > 3+/5 throughout      negative pronator drift        Right LE:            no focal weakness,  > 3+/5 throughout    Left LE:              no focal weakness,  > 3+/5 throughout          Sensation:         intact to light touch x 4 extremities                         no neglect or extinction on double simultaneous testing                       DTR :                     biceps/brachioradialis : equal                                              patella/ankle : equal     neg Babinski                        Coordination :      Finger to Nose :  neg dysmetria bilaterally      Gait :  not tested              PM&R Impression : admitted for c/o gait imbalance    1) deconditioned    2) no focal weakness      Recommendations / Plan :     1) Physical / Occupational therapy focusing on therapeutic exercises , bed mobility/transfer out of bed evaluation , progressive ambulation with assistive       devices prn .    2) Anticipated Disposition Plan / Recs  :    pending functional progress

## 2022-06-13 NOTE — CONSULT NOTE ADULT - PROBLEM SELECTOR RECOMMENDATION 9
.  PPSV = 70%, requires assistance for some ADLs  -PT Eval recommending home without skilled needs  -c/w supportive care, OOB, encourage movement

## 2022-06-13 NOTE — CONSULT NOTE ADULT - PROBLEM SELECTOR RECOMMENDATION 4
.  Patient is DNR/DNI, MOLST in chart  -designated HCP is Elizabeth Graham    In addition to the E/M visit, an advance care planning meeting was performed. Start time: 1:20PM; End time: 1:36PM; Total time: 16min. A face to face meeting to discuss advance care planning was held today regarding: ASHISH ROJO. Primary decision maker: Patient is unable to participate in decision making; Alternate/surrogate: . Discussed advance directives including, but not limited to, healthcare proxy and code status. Decision regarding code status: DNR/DNI; Documentation completed today: MOLST [facility] note

## 2022-06-13 NOTE — CONSULT NOTE ADULT - ASSESSMENT
per Internal Medicine    89 y o F with HFpEF, MR, a fib with RVR, episodic orthostatic hypotension admitted for unsteady gait.    Problem/Plan - 1:  ·  Problem: Orthostatic hypotension.   ·  Plan: Confirmed on admission with SBP drop from 168 sitting to 130 standing with diastolic 69->58. This correlates with her symptom onset immediately upon standing and worst in the morning before she has taken PO. The etiology is likely iatrogenic hypovolemia with daily torsemide and last week doubling up on the torsemide. Her metoprolol may be contributing; not taking other alpha inhibitors. It is possible that there is also a component of musculoskeletal weakness worsened by myeloma treatment, but that is likely secondary.  - hold torsemide  - encourage PO intake  - bid orthostatic vitals  - PT consultation  - c/w metoprolol for now, discuss with cardiology if persistently orthostatic  - F/u MRI; given pt's extensive CA history w/ current sx of blurry vision, Dr. Jules recommends we get a MRI to r/o occlusion of opthalmic artery.    Problem/Plan - 2:  ·  Problem: Chronic diastolic congestive heart failure.   ·  Plan: Longstanding Hx with recent unchanged TTE July 2021 in [place] with another manually input 8/10/21 TTE showing severe MR. Her MR may be contributing to her inability to mount an appropriate alpha agonist response to change in position.  - diuretic/BB as above  - pro-BNP  - c/w atorvastatin 40  - limited TTE  - f/u with Dr. Jules.    Problem/Plan - 3:  ·  Problem: Acute kidney injury superimposed on CKD.   ·  Plan: Admission Cr 1.58; her baseline has fluctuated for the past year but in 2021 she was 1.22 so this may be a mild DAVID secondary to torsemide vs progression of CKD. Follows with Dr. Segovia.  - Given heart failure and edema, observe off IVF  - urine electrolytes  - trend Cr.    Problem/Plan - 4:  ·  Problem: Atrial fibrillation.   ·  Plan: D/sp DCC 5/2021 on amiodarone, metoprolol, eliquis  - c/w amiodarone, eliquis.    Problem/Plan - 5:  ·  Problem: Multiple myeloma.   ·  Plan: Follows with Dr. Benito Giron, taking Velicaid weekly and an infusion every 6 weeks for bone health (bisphosphonate?)  - c/w velicaid, letrozole, infusion as outpatient  - f/u outpatient.    Problem/Plan - 6:  ·  Problem: Prophylactic measure.   ·  Plan: F: none  E: replete K<4.0, Mg<2.0, Phos<2.5  N: Full  DVT prophylaxis: already on eliquis  Access: PIV  Code Status: full (confirmed 6/11).

## 2022-06-13 NOTE — PROGRESS NOTE ADULT - PROBLEM SELECTOR PLAN 6
F: none  E: replete K<4.0, Mg<2.0, Phos<2.5  DVT prophylaxis: already on Eliquis  Access: PIV  Code Status: full (confirmed 6/11)
F: none  E: replete K<4.0, Mg<2.0, Phos<2.5  N: Full  DVT prophylaxis: already on eliquis  Access: PIV  Code Status: full (confirmed 6/11)

## 2022-06-13 NOTE — PROGRESS NOTE ADULT - PROBLEM SELECTOR PLAN 1
Confirmed on admission with SBP drop from 168 sitting to 130 standing with diastolic 69->58. This correlates with her symptom onset immediately upon standing and worst in the morning before she has taken PO. The etiology is likely iatrogenic hypovolemia with daily torsemide and last week doubling up on the torsemide. Her metoprolol may be contributing; not taking other alpha inhibitors. It is possible that there is also a component of musculoskeletal weakness worsened by myeloma treatment, but that is likely secondary.  - hold torsemide  - encourage PO intake  - bid orthostatic vitals  - PT consultation  - c/w metoprolol for now, discuss with cardiology if persistently orthostatic  - F/u MRI; given pt's extensive CA history w/ current sx of blurry vision, Dr. Jules recommends we get a MRI to r/o occlusion of opthalmic artery
Confirmed on admission with Systolic BP drop from 168 sitting to 130 standing. This correlates with her symptom onset immediately upon standing and worst in the morning before she has taken PO. The etiology is likely iatrogenic hypovolemia with daily torsemide and last week doubling up on the torsemide. MRI r/o acute pathology.   - counseled on adequate PO intake

## 2022-06-14 ENCOUNTER — APPOINTMENT (OUTPATIENT)
Dept: HEART AND VASCULAR | Facility: CLINIC | Age: 87
End: 2022-06-14

## 2022-06-16 ENCOUNTER — APPOINTMENT (OUTPATIENT)
Dept: HEART AND VASCULAR | Facility: CLINIC | Age: 87
End: 2022-06-16
Payer: MEDICARE

## 2022-06-16 ENCOUNTER — NON-APPOINTMENT (OUTPATIENT)
Age: 87
End: 2022-06-16

## 2022-06-16 VITALS
WEIGHT: 116 LBS | SYSTOLIC BLOOD PRESSURE: 105 MMHG | DIASTOLIC BLOOD PRESSURE: 65 MMHG | HEIGHT: 61 IN | HEART RATE: 69 BPM | BODY MASS INDEX: 21.9 KG/M2 | TEMPERATURE: 96.3 F

## 2022-06-16 PROCEDURE — 99214 OFFICE O/P EST MOD 30 MIN: CPT

## 2022-06-17 DIAGNOSIS — Z79.01 LONG TERM (CURRENT) USE OF ANTICOAGULANTS: ICD-10-CM

## 2022-06-17 DIAGNOSIS — Z51.5 ENCOUNTER FOR PALLIATIVE CARE: ICD-10-CM

## 2022-06-17 DIAGNOSIS — I13.0 HYPERTENSIVE HEART AND CHRONIC KIDNEY DISEASE WITH HEART FAILURE AND STAGE 1 THROUGH STAGE 4 CHRONIC KIDNEY DISEASE, OR UNSPECIFIED CHRONIC KIDNEY DISEASE: ICD-10-CM

## 2022-06-17 DIAGNOSIS — C90.00 MULTIPLE MYELOMA NOT HAVING ACHIEVED REMISSION: ICD-10-CM

## 2022-06-17 DIAGNOSIS — Z92.3 PERSONAL HISTORY OF IRRADIATION: ICD-10-CM

## 2022-06-17 DIAGNOSIS — N18.9 CHRONIC KIDNEY DISEASE, UNSPECIFIED: ICD-10-CM

## 2022-06-17 DIAGNOSIS — N17.0 ACUTE KIDNEY FAILURE WITH TUBULAR NECROSIS: ICD-10-CM

## 2022-06-17 DIAGNOSIS — E86.1 HYPOVOLEMIA: ICD-10-CM

## 2022-06-17 DIAGNOSIS — I95.1 ORTHOSTATIC HYPOTENSION: ICD-10-CM

## 2022-06-17 DIAGNOSIS — I50.32 CHRONIC DIASTOLIC (CONGESTIVE) HEART FAILURE: ICD-10-CM

## 2022-06-17 DIAGNOSIS — Z85.038 PERSONAL HISTORY OF OTHER MALIGNANT NEOPLASM OF LARGE INTESTINE: ICD-10-CM

## 2022-06-17 DIAGNOSIS — Z85.3 PERSONAL HISTORY OF MALIGNANT NEOPLASM OF BREAST: ICD-10-CM

## 2022-06-17 DIAGNOSIS — Z90.11 ACQUIRED ABSENCE OF RIGHT BREAST AND NIPPLE: ICD-10-CM

## 2022-06-17 DIAGNOSIS — T50.2X5A ADVERSE EFFECT OF CARBONIC-ANHYDRASE INHIBITORS, BENZOTHIADIAZIDES AND OTHER DIURETICS, INITIAL ENCOUNTER: ICD-10-CM

## 2022-06-17 DIAGNOSIS — I48.20 CHRONIC ATRIAL FIBRILLATION, UNSPECIFIED: ICD-10-CM

## 2022-06-17 DIAGNOSIS — Z87.891 PERSONAL HISTORY OF NICOTINE DEPENDENCE: ICD-10-CM

## 2022-06-17 DIAGNOSIS — Z66 DO NOT RESUSCITATE: ICD-10-CM

## 2022-06-17 DIAGNOSIS — E78.5 HYPERLIPIDEMIA, UNSPECIFIED: ICD-10-CM

## 2022-06-17 DIAGNOSIS — D64.9 ANEMIA, UNSPECIFIED: ICD-10-CM

## 2022-06-20 NOTE — HISTORY OF PRESENT ILLNESS
[FreeTextEntry1] : Patient was hospitalized for neurologic symptoms patient had an MRI MRA and neuro evaluation patient ruled out for any acute infarct patient has paroxysmal A. 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

## 2022-06-20 NOTE — REASON FOR VISIT
[Arrhythmia/ECG Abnorrmalities] : arrhythmia/ECG abnormalities [Hyperlipidemia] : hyperlipidemia [Hypertension] : hypertension [Carotid, Aortic and Peripheral Vascular Disease] : carotid, aortic and peripheral vascular disease

## 2022-06-20 NOTE — ASSESSMENT
[FreeTextEntry1] : Patient with multiple problems 1 atrial fibrillation on amiodarone status post ablation in sinus rhythm to mitral regurgitation status post mitral valve clip with significant residual mitral regurgitation 3 bilateral edema due to orthostatic changes unable to diurese vigorously have recommended compression stockings and to continue torsemide 20 mg a day for neurologic event unclear this was 2-minute visual disturbance followed by some extreme fatigue patient will follow-up with Dr. Canas MRI MRA in the hospital did not show significant pathology patient has multiple myeloma and treatment patient has chronic renal failure last creatinine was 1.82

## 2022-06-20 NOTE — PHYSICAL EXAM
[Well Developed] : well developed [Well Nourished] : well nourished [No Acute Distress] : no acute distress [Normal Conjunctiva] : normal conjunctiva [Normal Venous Pressure] : normal venous pressure [No Carotid Bruit] : no carotid bruit [Normal S1, S2] : normal S1, S2 [No Rub] : no rub [No Gallop] : no gallop [Clear Lung Fields] : clear lung fields [Good Air Entry] : good air entry [No Respiratory Distress] : no respiratory distress  [Soft] : abdomen soft [Non Tender] : non-tender [No Masses/organomegaly] : no masses/organomegaly [Normal Bowel Sounds] : normal bowel sounds [Normal Gait] : normal gait [No Cyanosis] : no cyanosis [No Clubbing] : no clubbing [No Varicosities] : no varicosities [Edema ___] : edema [unfilled] [No Rash] : no rash [No Skin Lesions] : no skin lesions [Moves all extremities] : moves all extremities [No Focal Deficits] : no focal deficits [Normal Speech] : normal speech [Alert and Oriented] : alert and oriented [Normal memory] : normal memory [de-identified] : 2/6 allison

## 2022-06-23 ENCOUNTER — EMERGENCY (EMERGENCY)
Facility: HOSPITAL | Age: 87
LOS: 1 days | Discharge: HOME | End: 2022-06-23
Attending: EMERGENCY MEDICINE | Admitting: EMERGENCY MEDICINE
Payer: MEDICARE

## 2022-06-23 VITALS
RESPIRATION RATE: 18 BRPM | TEMPERATURE: 98 F | SYSTOLIC BLOOD PRESSURE: 152 MMHG | HEART RATE: 60 BPM | DIASTOLIC BLOOD PRESSURE: 70 MMHG | OXYGEN SATURATION: 96 %

## 2022-06-23 VITALS
HEIGHT: 62 IN | SYSTOLIC BLOOD PRESSURE: 127 MMHG | TEMPERATURE: 97 F | HEART RATE: 90 BPM | DIASTOLIC BLOOD PRESSURE: 65 MMHG | RESPIRATION RATE: 18 BRPM | WEIGHT: 117.07 LBS | OXYGEN SATURATION: 96 %

## 2022-06-23 DIAGNOSIS — C90.00 MULTIPLE MYELOMA NOT HAVING ACHIEVED REMISSION: ICD-10-CM

## 2022-06-23 DIAGNOSIS — I48.91 UNSPECIFIED ATRIAL FIBRILLATION: ICD-10-CM

## 2022-06-23 DIAGNOSIS — I11.0 HYPERTENSIVE HEART DISEASE WITH HEART FAILURE: ICD-10-CM

## 2022-06-23 DIAGNOSIS — C50.919 MALIGNANT NEOPLASM OF UNSPECIFIED SITE OF UNSPECIFIED FEMALE BREAST: ICD-10-CM

## 2022-06-23 DIAGNOSIS — R07.81 PLEURODYNIA: ICD-10-CM

## 2022-06-23 DIAGNOSIS — C20 MALIGNANT NEOPLASM OF RECTUM: ICD-10-CM

## 2022-06-23 DIAGNOSIS — R51.9 HEADACHE, UNSPECIFIED: ICD-10-CM

## 2022-06-23 DIAGNOSIS — I50.30 UNSPECIFIED DIASTOLIC (CONGESTIVE) HEART FAILURE: ICD-10-CM

## 2022-06-23 DIAGNOSIS — M25.552 PAIN IN LEFT HIP: ICD-10-CM

## 2022-06-23 DIAGNOSIS — Z20.822 CONTACT WITH AND (SUSPECTED) EXPOSURE TO COVID-19: ICD-10-CM
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
## 2022-06-23 DIAGNOSIS — Z92.3 PERSONAL HISTORY OF IRRADIATION: ICD-10-CM

## 2022-06-23 DIAGNOSIS — Z79.01 LONG TERM (CURRENT) USE OF ANTICOAGULANTS: ICD-10-CM

## 2022-06-23 DIAGNOSIS — Z87.891 PERSONAL HISTORY OF NICOTINE DEPENDENCE: ICD-10-CM

## 2022-06-23 DIAGNOSIS — E78.5 HYPERLIPIDEMIA, UNSPECIFIED: ICD-10-CM

## 2022-06-23 DIAGNOSIS — Z90.11 ACQUIRED ABSENCE OF RIGHT BREAST AND NIPPLE: ICD-10-CM
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
## 2022-06-23 PROCEDURE — 71250 CT THORAX DX C-: CPT | Mod: 26,ME

## 2022-06-23 PROCEDURE — 72125 CT NECK SPINE W/O DYE: CPT | Mod: 26,MG

## 2022-06-23 PROCEDURE — 74176 CT ABD & PELVIS W/O CONTRAST: CPT | Mod: 26,MG

## 2022-06-23 PROCEDURE — 72125 CT NECK SPINE W/O DYE: CPT | Mod: MG

## 2022-06-23 PROCEDURE — 99284 EMERGENCY DEPT VISIT MOD MDM: CPT

## 2022-06-23 PROCEDURE — G1004: CPT

## 2022-06-23 PROCEDURE — 74176 CT ABD & PELVIS W/O CONTRAST: CPT | Mod: MG

## 2022-06-23 PROCEDURE — 70450 CT HEAD/BRAIN W/O DYE: CPT | Mod: 26,MG

## 2022-06-23 PROCEDURE — 93010 ELECTROCARDIOGRAM REPORT: CPT

## 2022-06-23 PROCEDURE — 93005 ELECTROCARDIOGRAM TRACING: CPT

## 2022-06-23 PROCEDURE — 70450 CT HEAD/BRAIN W/O DYE: CPT | Mod: MG

## 2022-06-23 PROCEDURE — 99284 EMERGENCY DEPT VISIT MOD MDM: CPT | Mod: 25

## 2022-06-23 PROCEDURE — 71250 CT THORAX DX C-: CPT | Mod: ME

## 2022-06-23 RX ORDER — ACETAMINOPHEN 500 MG
500 TABLET ORAL ONCE
Refills: 0 | Status: COMPLETED | OUTPATIENT
Start: 2022-06-23 | End: 2022-06-23

## 2022-06-23 RX ADMIN — Medication 500 MILLIGRAM(S): at 13:46

## 2022-06-23 NOTE — ED PROVIDER NOTE - PATIENT PORTAL LINK FT
You can access the FollowMyHealth Patient Portal offered by [facility] by registering at the following website: [email or web address]. By joining [employer]’s FollowMyHealth portal, you will also be able to view your health information using other applications (apps) compatible with our system.

## 2022-06-23 NOTE — ED ADULT NURSE NOTE - NSIMPLEMENTINTERV_GEN_ALL_ED
Implemented All Fall with Harm Risk Interventions:  [place] to call system. Call bell, personal items and telephone within reach. Instruct patient to call for assistance. Room bathroom lighting operational. Non-slip footwear when patient is off stretcher. Physically safe environment: no spills, clutter or unnecessary equipment. Stretcher in lowest position, wheels locked, appropriate side rails in place. Provide visual cue, wrist band, yellow gown, etc. Monitor gait and stability. Monitor for mental status changes and reorient to person, place, and time. Review medications for side effects contributing to fall risk. Reinforce activity limits and safety measures with patient and family. Provide visual clues: red socks.

## 2022-06-23 NOTE — ED ADULT NURSE NOTE - OBJECTIVE STATEMENT
89y Female c/o fall. Pt reports trip and fall at home this am while going from bedroom to bathroom. Pt is on Eliquis, reports fell on L side of body, possible head strike, +pain L rib cage, worsens with movement. No obvious deformity/bleeding noted. Pt denies LOC, CP, dizziness, SOB, F/C, N/V/D.

## 2022-06-23 NOTE — ED PROVIDER NOTE - OBJECTIVE STATEMENT
89 y/o F with PMHx HTN, HLD, multiple myeloma (on active treatment), rectal cancer s/p radiation treatment (in remission), breast cancer s/p right lumpectomy (in remission), HFpEF, and Afib (on Eliquis) presents to ED s/p mechanical trip and fall at home. Pt reports falling on her left side and may have struck her head. No LOC or prodrome. Now with pain to left breast / rib, left hip, and let side of head. GCS 15.

## 2022-06-23 NOTE — ED PROVIDER NOTE - CLINICAL SUMMARY MEDICAL DECISION MAKING FREE TEXT BOX
89 y/o F on eliquis presents to ED s/p mechanical trip and fall with possible head strike with c/o left breast / rib pain, left hip pain, and left sided head pain. Plan for labs, CT head and c-spine, CT a/p, and CT chest. 89 y/o F on Eliquis presents to ED s/p mechanical trip and fall with possible head strike with c/o left breast / rib pain, left hip pain, and left sided head pain. Plan for labs, CT head and c-spine, CT a/p, and CT chest. Pt ambulating without assistant upon d/c. c/o left rib pain. I called radiology attending to review  CT, st no rib fx on left noted, cannot r/o undisplaced fx. Case discussed with Dr Jules, pt to f/u with oncologist for calcified lymph nodes found on CT

## 2022-06-23 NOTE — ED ADULT TRIAGE NOTE - BRAND OF FIRST COVID-19 BOOSTER
GENERAL SURGERY PROGRESS NOTE     CC: Abdominal abscess, resolved; Hospital day #24    INTERVAL HISTORY: Patient is doing well. Patient reports no abdominal pain, nausea or vomiting. Patient is able to move her bowels without any difficulty. No difficulty breathing with trach capped. Patient is tolerating J tube feedings.     REVIEW OF SYSTEMS    CONSTITUTIONAL: Denies - fever and chills.   CARDIOVASCuLAR: Denies - chest pain, palpitations   RESPIRATORY: Denies - shortness of breath    Pertinent past history  Past Medical History:   Diagnosis Date   • Anxiety    • Blood clot associated with vein wall inflammation    • Cerebral infarction (CMS/HCC)    • Congestive cardiac failure (CMS/HCC)    • Coronary artery disease    • Depression    • Diabetes mellitus (CMS/HCC)     Type II   • Essential (primary) hypertension    • High cholesterol    • Myocardial infarction (CMS/HCC)    • Uncomplicated senile dementia (CMS/HCC)    • Urinary incontinence      Past Surgical History:   Procedure Laterality Date   • Abdomen surgery     • Brain surgery      Crainiotomy   • Cardiac catherization      Double bypass   • Cardiac surgery     •  section, classic           PHYSICAL EXAM:   Constitutional:   Visit Vitals  BP (!) 142/66 (BP Location: LUE - Left upper extremity, Patient Position: Semi-Ruelas's)   Pulse (!) 102   Temp 98.2 °F (36.8 °C) (Oral)   Resp 16   Ht 5' 2\" (1.575 m)   Wt 68 kg   SpO2 98%   BMI 27.42 kg/m²    -appears comforable -obese body habitus  Resp: -Normal effort -normal breath sounds bilaterally  CV: -Regular rate and rhythm -no lower extremity edema  GI: soft, non-tender, J tube in place, VIVIENNE drain has minimal output- SS 5 mL output  Psych: -Oriented to person, place, and time -Normal mood and affect    Laboratory Results:  WBC (K/mcL)   Date Value   2020 8.7     HGB (g/dL)   Date Value   2020 10.0 (L)     PLT (K/mcL)   Date Value   2020 232     Creatinine (mg/dL)   Date Value    12/11/2020 0.43 (L)     INR (sec)   Date Value   11/17/2020 1.6          Imaging     CT ABDOMEN PELVIS W CONTRAST   Final Result by Jordi Vieira MD (12/08 6998)   IMPRESSION: Circumferential wall thickening of the cecum/right colon.   Question tenzin tico mass.       .Suggestion of possible focal area of nodularity posterior left lateral   aspect of the urinary bladder. Direct visualization may be of benefit.       Gastrojejunostomy tube in place.       Mild atelectatic change left base When and if deemed clinically appropriate   recommended.   Gastrojejunostomy tube in place           XR ABDOMEN AP KUB   Final Result by Jordi Vieira MD (12/07 9311)   IMPRESSION: No bowel obstructive pattern evident. Gastrojejunostomy tube in   place.       CT NECK SOFT TISSUE WO CONTRAST   Final Result by Coy Roque MD (12/04 1123)   IMPRESSION:         1.  Tracheostomy in place without significant narrowing of the visualized   trachea or larynx.       2.  Prominent atherosclerotic calcifications in the bilateral carotid   bulbs. Further evaluation with ultrasound is recommended.       I have personally reviewed the images and modified the resident's report as   necessary.       US Lower Extremity Venous Duplex Right   Final Result by Mario Nielsen MD (12/01 2216)   IMPRESSION:  Normal duplex scan of the inferior vena cava, bilateral   external iliac, and bilateral common femoral veins. The right profunda   femoral, femoral, popliteal, gastrocnemius, tibioperoneal, and great and   small saphenous veins are also sonographically normal. No evidence of deep   or superficial venous thrombosis.           TECHNOLOGIST:         CT ABDOMEN PELVIS WO CONTRAST   Final Result by Lawrence M Dubin, MD (11/27 1110)   IMPRESSION:       1.  The abscess in the left anterior abdominal wall has resolved       2.  Placement of gastrojejunostomy tube. No bowel distention       3.  Areas of infiltration of the  subcutaneous fat in the anterior abdominal   wall. These are probably related to injections however cellulitis cannot be   excluded. There is also prominent area of density in the subcutaneous fat   in the left lower lateral abdominal wall possibly representing cellulitis         ASSESSMENT:  Patient is a 58 y/o female with an abdominal abscess s/p VIVIENNE drain. Patient appears to be recovering well. Patient is in no acute distress.    PLAN:  · Monitor VIVIENNE drain output  · J tube feedings   · Can try PO with speech clearance  · Continue with PPI BID  · Continue with abx regimen  · Normocytic Anemia - continue to trend H&H      Faith Case  Medical Student  General Vascular Surgery    I have seen and examined the patient. No abnormal findings noted, discussed with  student and agree with the findings and plan as documented. Counseled the patient in regards to next steps.    I have personally interviewed and examined the patient. I confirmed the findings of:     This was discussed with  student and I agree with the assessment and plan as documented. The plan of care was discussed with the patient and they were counseled in regards to next steps.      Yoan LOO  General Vascular Surgery  Pager [phone]           [employer]

## 2022-06-29 ENCOUNTER — APPOINTMENT (OUTPATIENT)
Dept: HEART AND VASCULAR | Facility: CLINIC | Age: 87
End: 2022-06-29

## 2022-06-29 VITALS
WEIGHT: 116 LBS | BODY MASS INDEX: 21.9 KG/M2 | SYSTOLIC BLOOD PRESSURE: 126 MMHG | OXYGEN SATURATION: 80 % | HEART RATE: 74 BPM | TEMPERATURE: 97.3 F | HEIGHT: 61 IN | DIASTOLIC BLOOD PRESSURE: 65 MMHG

## 2022-06-29 DIAGNOSIS — G45.9 TRANSIENT CEREBRAL ISCHEMIC ATTACK, UNSPECIFIED: ICD-10-CM

## 2022-06-29 PROCEDURE — 99214 OFFICE O/P EST MOD 30 MIN: CPT

## 2022-06-29 NOTE — PHYSICAL EXAM
[Well Developed] : well developed [Well Nourished] : well nourished [No Acute Distress] : no acute distress [Normal Conjunctiva] : normal conjunctiva [Normal Venous Pressure] : normal venous pressure [No Carotid Bruit] : no carotid bruit [Normal S1, S2] : normal S1, S2 [No Rub] : no rub [No Gallop] : no gallop [Clear Lung Fields] : clear lung fields [Good Air Entry] : good air entry [No Respiratory Distress] : no respiratory distress  [Soft] : abdomen soft [Non Tender] : non-tender [No Masses/organomegaly] : no masses/organomegaly [Normal Bowel Sounds] : normal bowel sounds [Normal Gait] : normal gait [No Cyanosis] : no cyanosis [No Clubbing] : no clubbing [No Varicosities] : no varicosities [Edema ___] : edema [unfilled] [No Rash] : no rash [No Skin Lesions] : no skin lesions [Moves all extremities] : moves all extremities [No Focal Deficits] : no focal deficits [Normal Speech] : normal speech [Alert and Oriented] : alert and oriented [Normal memory] : normal memory [de-identified] : 2/6 allison [de-identified] : skin discolored

## 2022-06-29 NOTE — ASSESSMENT
[FreeTextEntry1] : Patient with the following problems 1 mitral great regurgitation patient on torsemide patient can walk on flats but not up hills t2 atrial fibrillation patient in sinus rhythm post ablation on amiodarone 100 mg every other day half a tablet of 200 3 multiple myeloma actively being treated by Dr. 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 Ezekiel Ortizfor second opinion as to the need for incision 5 visual changes in her left I discussed with Dr. Tate and patient will be evaluated in these episodes of happened over a period of 2 years

## 2022-07-05 RX ORDER — CALCIUM CARBONATE 160(400)MG
TABLET,CHEWABLE ORAL
Qty: 1 | Refills: 0 | Status: ACTIVE | OUTPATIENT
Start: 2022-07-01

## 2022-07-22 ENCOUNTER — APPOINTMENT (OUTPATIENT)
Dept: NEPHROLOGY | Facility: CLINIC | Age: 87
End: 2022-07-22

## 2022-08-11 ENCOUNTER — APPOINTMENT (OUTPATIENT)
Dept: HEART AND VASCULAR | Facility: CLINIC | Age: 87
End: 2022-08-11

## 2022-08-11 ENCOUNTER — NON-APPOINTMENT (OUTPATIENT)
Age: 87
End: 2022-08-11

## 2022-08-11 VITALS
OXYGEN SATURATION: 83 % | DIASTOLIC BLOOD PRESSURE: 65 MMHG | HEART RATE: 67 BPM | SYSTOLIC BLOOD PRESSURE: 134 MMHG | HEIGHT: 61 IN | TEMPERATURE: 97.3 F | BODY MASS INDEX: 21.34 KG/M2 | WEIGHT: 113 LBS

## 2022-08-11 PROCEDURE — 93000 ELECTROCARDIOGRAM COMPLETE: CPT

## 2022-08-11 PROCEDURE — 99214 OFFICE O/P EST MOD 30 MIN: CPT | Mod: 25

## 2022-08-11 NOTE — ASSESSMENT
[FreeTextEntry1] : Patient with the following problems 1 mitral great regurgitation patient on torsemide patient can walk on flats but not up hills t2 atrial fibrillation patient in sinus rhythm post ablation on amiodarone 100 mg every other day half a tablet of 200 3 multiple myeloma actively being treated by Dr. 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 Tate and patient will be evaluated in these episodes of happened over a period of 2 years

## 2022-08-11 NOTE — HISTORY OF PRESENT ILLNESS
[FreeTextEntry1] : pt with dizziness since a fall that is positional healing from radiation complaining o ffatigue

## 2022-08-11 NOTE — PHYSICAL EXAM
[Well Developed] : well developed [Well Nourished] : well nourished [No Acute Distress] : no acute distress [Normal Conjunctiva] : normal conjunctiva [Normal Venous Pressure] : normal venous pressure [No Carotid Bruit] : no carotid bruit [Normal S1, S2] : normal S1, S2 [No Rub] : no rub [No Gallop] : no gallop [Clear Lung Fields] : clear lung fields [Good Air Entry] : good air entry [No Respiratory Distress] : no respiratory distress  [Soft] : abdomen soft [Non Tender] : non-tender [No Masses/organomegaly] : no masses/organomegaly [Normal Bowel Sounds] : normal bowel sounds [Normal Gait] : normal gait [No Cyanosis] : no cyanosis [No Clubbing] : no clubbing [No Varicosities] : no varicosities [Edema ___] : edema [unfilled] [No Rash] : no rash [No Skin Lesions] : no skin lesions [Moves all extremities] : moves all extremities [No Focal Deficits] : no focal deficits [Normal Speech] : normal speech [Alert and Oriented] : alert and oriented [Normal memory] : normal memory [de-identified] : 2/6 allison [de-identified] : skin discolored

## 2022-08-28 ENCOUNTER — FORM ENCOUNTER (OUTPATIENT)
Age: 87
End: 2022-08-28

## 2022-08-29 ENCOUNTER — NON-APPOINTMENT (OUTPATIENT)
Age: 87
End: 2022-08-29

## 2022-08-29 ENCOUNTER — APPOINTMENT (OUTPATIENT)
Dept: CARDIOTHORACIC SURGERY | Facility: CLINIC | Age: 87
End: 2022-08-29

## 2022-08-29 ENCOUNTER — OUTPATIENT (OUTPATIENT)
Dept: OUTPATIENT SERVICES | Facility: HOSPITAL | Age: 87
LOS: 1 days | End: 2022-08-29
Payer: MEDICARE

## 2022-08-29 VITALS
BODY MASS INDEX: 20.77 KG/M2 | TEMPERATURE: 96.9 F | WEIGHT: 110 LBS | HEIGHT: 61 IN | OXYGEN SATURATION: 97 % | DIASTOLIC BLOOD PRESSURE: 74 MMHG | SYSTOLIC BLOOD PRESSURE: 181 MMHG | RESPIRATION RATE: 16 BRPM | HEART RATE: 62 BPM

## 2022-08-29 DIAGNOSIS — I34.0 NONRHEUMATIC MITRAL (VALVE) INSUFFICIENCY: ICD-10-CM

## 2022-08-29 PROCEDURE — 93306 TTE W/DOPPLER COMPLETE: CPT

## 2022-08-29 PROCEDURE — 99213 OFFICE O/P EST LOW 20 MIN: CPT

## 2022-08-29 PROCEDURE — 93005 ELECTROCARDIOGRAM TRACING: CPT

## 2022-08-29 PROCEDURE — 93010 ELECTROCARDIOGRAM REPORT: CPT

## 2022-08-29 PROCEDURE — 76376 3D RENDER W/INTRP POSTPROCES: CPT | Mod: 26

## 2022-08-29 PROCEDURE — 93306 TTE W/DOPPLER COMPLETE: CPT | Mod: 26

## 2022-09-06 NOTE — PHYSICAL EXAM
[Well Developed] : well developed [Well Nourished] : well nourished [No Acute Distress] : no acute distress [Normal Venous Pressure] : normal venous pressure [No Carotid Bruit] : no carotid bruit [Normal S1, S2] : normal S1, S2 [No Murmur] : no murmur [No Rub] : no rub [No Gallop] : no gallop [Clear Lung Fields] : clear lung fields [Good Air Entry] : good air entry [No Respiratory Distress] : no respiratory distress  [Soft] : abdomen soft [Non Tender] : non-tender [No Masses/organomegaly] : no masses/organomegaly [Normal Bowel Sounds] : normal bowel sounds [Normal Gait] : normal gait [No Rash] : no rash [No Skin Lesions] : no skin lesions [Moves all extremities] : moves all extremities [No Focal Deficits] : no focal deficits [Normal Speech] : normal speech [Alert and Oriented] : alert and oriented [Normal memory] : normal memory [de-identified] : B/l LE edema, non-pitting

## 2022-09-06 NOTE — HISTORY OF PRESENT ILLNESS
[FreeTextEntry1] : \par 89 year old female, former smoker (quit in 1989), with a history of HTN, hyperlipidemia, multiple myeloma 2018, breast cancer s/p right lumpectomy, rectal cancer s/p radiation treatment, atrial fibrillation s/p DCCV in May 2021 (on Eliquis), chronic diastolic heart failure with mitral regurgitation  s/p Mitraclip on 06/8/2021 who presents for her one year follow up.\par \par ECHO 8/29/22: normal LV systolic function, mitraclip noted in position, mean transvalvular gradient is 3.00 mmHg at a heart rate of 59 bpm. There is moderate mitral regurgitation (lateral to the clip). There is trace mitral regurgitation \par (medial to the clip). \par \par Since her last visit, the patient states that she finished her treatment for skin CA about 4 weeks ago, and has been recovering. She felt well after the procedure, but since radiation has felt tired, but is walking slowly more each day. She does feel sluggish in the mornings which improves before lunch. Reports positional dizziness as well, when getting up too quickly, and reports LE edema after radiation. Denies chest pain, SOB, palpitations, orthopnea.

## 2022-09-06 NOTE — REVIEW OF SYSTEMS
[Feeling Fatigued] : feeling fatigued [Dyspnea on exertion] : dyspnea during exertion [Lower Ext Edema] : lower extremity edema [Feeling Tired] : feeling tired [As Noted in HPI] : as noted in HPI [Shortness Of Breath] : shortness of breath [Negative] : Heme/Lymph [Fever] : no fever [Headache] : no headache [Chills] : no chills [SOB] : no shortness of breath [Chest Discomfort] : no chest discomfort [Orthopnea] : no orthopnea [Syncope] : no syncope [Cough] : no cough [Wheezing] : no wheezing [Coughing Up Blood] : no hemoptysis [Abdominal Pain] : no abdominal pain [Vomiting] : no vomiting [Change in Appetite] : no change in appetite [Dysphagia] : no dysphagia [Diarrhea] : diarrhea [Constipation] : no constipation [Joint Pain] : no joint pain [Myalgia] : no myalgia [Rash] : no rash [Dizziness] : no dizziness [Numbness (Hypoesthesia)] : no numbness [Weakness] : no weakness [Limb Weakness (Paresis)] : no limb weakness (Paresis) [Speech Disturbance] : no speech disturbance [Confusion] : no confusion was observed [FreeTextEntry5] : FORBES after 3 city blocks, chronic LE edema.

## 2022-09-06 NOTE — REASON FOR VISIT
[Other: ____] : [unfilled] [Follow-Up: _____] : a [unfilled] follow-up visit [FreeTextEntry1] : mitral regurgitation

## 2022-09-09 NOTE — DISCHARGE NOTE NURSING/CASE MANAGEMENT/SOCIAL WORK - NSDCPEPT PROEDHF_GEN_ALL_CORE
To Dr. Chang.  Rx failed protocol. Please advise on refill. Thank you.  
Call primary care provider for follow up after discharge/Activities as tolerated/Low salt diet/Monitor weight daily/Report signs and symptoms to primary care provider

## 2022-09-22 ENCOUNTER — APPOINTMENT (OUTPATIENT)
Dept: HEART AND VASCULAR | Facility: CLINIC | Age: 87
End: 2022-09-22

## 2022-09-22 ENCOUNTER — NON-APPOINTMENT (OUTPATIENT)
Age: 87
End: 2022-09-22

## 2022-09-22 VITALS
TEMPERATURE: 97.2 F | HEIGHT: 61 IN | OXYGEN SATURATION: 93 % | SYSTOLIC BLOOD PRESSURE: 95 MMHG | DIASTOLIC BLOOD PRESSURE: 56 MMHG | HEART RATE: 73 BPM | WEIGHT: 110 LBS | BODY MASS INDEX: 20.77 KG/M2

## 2022-09-22 PROCEDURE — 93000 ELECTROCARDIOGRAM COMPLETE: CPT

## 2022-09-22 PROCEDURE — 99214 OFFICE O/P EST MOD 30 MIN: CPT | Mod: 25

## 2022-09-22 NOTE — ASSESSMENT
[FreeTextEntry1] : Patient with the following problems 1 mitral great regurgitation patient on torsemide patient can walk on flats but not [place] t2 atrial fibrillation patient in sinus rhythm post ablation on amiodarone 100 mg every other day half a tablet of 200 3 multiple myeloma actively being treated by Dr. 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 Tate and patient will be evaluated in these episodes of happened over a period of 2 years\par \par pt doing better today

## 2022-09-22 NOTE — HISTORY OF PRESENT ILLNESS
[FreeTextEntry1] : pt with fatigue but stable pt receiving treatment for multiple myeloma\par \par pt saw dr cali for f/u on mitral valve clip\par \par pt s/p ablation for afib aon amiodarone 50mg po qod

## 2022-09-22 NOTE — PHYSICAL EXAM
[Well Developed] : well developed [Well Nourished] : well nourished [No Acute Distress] : no acute distress [Normal Conjunctiva] : normal conjunctiva [Normal Venous Pressure] : normal venous pressure [No Carotid Bruit] : no carotid bruit [Normal S1, S2] : normal S1, S2 [No Rub] : no rub [No Gallop] : no gallop [Clear Lung Fields] : clear lung fields [Good Air Entry] : good air entry [No Respiratory Distress] : no respiratory distress  [Soft] : abdomen soft [Non Tender] : non-tender [No Masses/organomegaly] : no masses/organomegaly [Normal Bowel Sounds] : normal bowel sounds [Normal Gait] : normal gait [No Cyanosis] : no cyanosis [No Clubbing] : no clubbing [No Varicosities] : no varicosities [Edema ___] : edema [unfilled] [No Rash] : no rash [No Skin Lesions] : no skin lesions [Moves all extremities] : moves all extremities [No Focal Deficits] : no focal deficits [Normal Speech] : normal speech [Alert and Oriented] : alert and oriented [Normal memory] : normal memory [de-identified] : 2/6 allison [de-identified] : skin discolored

## 2022-09-26 NOTE — ED ADULT TRIAGE NOTE - CHIEF COMPLAINT QUOTE
I had a MyChart visit from the patient that he stopped his Keppra or missed enough doses that he ended up having seizures he is back on his Keppra and he did go the emergency room they discussed it as well. And I sent him a message stating that he is not allowed to operate a motor vehicle until he 6 months seizure-free. head injury

## 2022-10-11 ENCOUNTER — NON-APPOINTMENT (OUTPATIENT)
Age: 87
End: 2022-10-11

## 2022-10-12 ENCOUNTER — NON-APPOINTMENT (OUTPATIENT)
Age: 87
End: 2022-10-12

## 2022-11-09 ENCOUNTER — APPOINTMENT (OUTPATIENT)
Dept: HEART AND VASCULAR | Facility: CLINIC | Age: 87
End: 2022-11-09

## 2022-11-09 VITALS
TEMPERATURE: 97.1 F | HEIGHT: 61 IN | SYSTOLIC BLOOD PRESSURE: 147 MMHG | OXYGEN SATURATION: 92 % | HEART RATE: 70 BPM | DIASTOLIC BLOOD PRESSURE: 75 MMHG

## 2022-11-09 DIAGNOSIS — N18.32 CHRONIC KIDNEY DISEASE, STAGE 3B: ICD-10-CM

## 2022-11-09 DIAGNOSIS — E87.5 HYPERKALEMIA: ICD-10-CM

## 2022-11-09 DIAGNOSIS — C90.01 MULTIPLE MYELOMA IN REMISSION: ICD-10-CM

## 2022-11-09 PROCEDURE — 99214 OFFICE O/P EST MOD 30 MIN: CPT | Mod: 25

## 2022-11-13 PROBLEM — E87.5 HYPERKALEMIA: Status: ACTIVE | Noted: 2022-03-05

## 2022-11-13 PROBLEM — C90.01 MULTIPLE MYELOMA IN REMISSION: Status: ACTIVE | Noted: 2021-04-22

## 2022-11-13 PROBLEM — N18.32 STAGE 3B CHRONIC KIDNEY DISEASE: Status: ACTIVE | Noted: 2021-07-09

## 2022-11-13 NOTE — ASSESSMENT
[FreeTextEntry1] : Patient with the following problems 1 mitral  regurgitation patient on torsemide patient can walk on flats but not [place] 2 atrial fibrillation patient in sinus rhythm post ablation on amiodarone 100 mg every other day half a tablet of 200 3 multiple myeloma actively being treated by Dr. 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 Tate and patient will be evaluated in these episodes of happened over a period of 2 years\par \par pt doing better today no furtur visual complaints

## 2022-11-13 NOTE — PHYSICAL EXAM
[Well Developed] : well developed [Well Nourished] : well nourished [No Acute Distress] : no acute distress [Normal Conjunctiva] : normal conjunctiva [Normal Venous Pressure] : normal venous pressure [No Carotid Bruit] : no carotid bruit [Normal S1, S2] : normal S1, S2 [No Rub] : no rub [No Gallop] : no gallop [Clear Lung Fields] : clear lung fields [Good Air Entry] : good air entry [No Respiratory Distress] : no respiratory distress  [Soft] : abdomen soft [Non Tender] : non-tender [No Masses/organomegaly] : no masses/organomegaly [Normal Bowel Sounds] : normal bowel sounds [Normal Gait] : normal gait [No Cyanosis] : no cyanosis [No Clubbing] : no clubbing [No Varicosities] : no varicosities [Edema ___] : edema [unfilled] [No Rash] : no rash [No Skin Lesions] : no skin lesions [Moves all extremities] : moves all extremities [Normal Speech] : normal speech [No Focal Deficits] : no focal deficits [Alert and Oriented] : alert and oriented [Normal memory] : normal memory [de-identified] : 2/6 allison [de-identified] : skin discolored

## 2023-01-10 ENCOUNTER — RX RENEWAL (OUTPATIENT)
Age: 88
End: 2023-01-10

## 2023-01-26 ENCOUNTER — APPOINTMENT (OUTPATIENT)
Dept: HEART AND VASCULAR | Facility: CLINIC | Age: 88
End: 2023-01-26
Payer: MEDICARE

## 2023-01-26 VITALS
TEMPERATURE: 97.2 F | WEIGHT: 110 LBS | OXYGEN SATURATION: 95 % | BODY MASS INDEX: 20.77 KG/M2 | DIASTOLIC BLOOD PRESSURE: 73 MMHG | HEART RATE: 73 BPM | HEIGHT: 61 IN | SYSTOLIC BLOOD PRESSURE: 181 MMHG

## 2023-01-26 VITALS — DIASTOLIC BLOOD PRESSURE: 73 MMHG | SYSTOLIC BLOOD PRESSURE: 182 MMHG

## 2023-01-26 PROCEDURE — 99214 OFFICE O/P EST MOD 30 MIN: CPT | Mod: 25,95

## 2023-01-26 PROCEDURE — 93000 ELECTROCARDIOGRAM COMPLETE: CPT

## 2023-01-26 NOTE — REVIEW OF SYSTEMS
[Feeling Fatigued] : feeling fatigued [Dyspnea on exertion] : dyspnea during exertion [Lower Ext Edema] : lower extremity edema [Dizziness] : dizziness [Negative] : ENT [de-identified] : visual disturbance transient

## 2023-01-26 NOTE — DISCUSSION/SUMMARY
[FreeTextEntry1] : Patient with multiple medical problems active problems are 1 multiple myeloma for which she gets treatment every 2 weeks by Dr. Giron\par \par Patient has a mitral valve clip for severe mitral regurgitation but has residual moderate mitral regurg\par \par Patient has paroxysmal atrial fibrillation patient's status post ablation patient is on Eliquis and 100 mg of amiodarone as of amiodarone every other day day patient was in sinus rhythm today today today\par \par Patient is still significantly symptomatic upon walking I will try Farxiga in addition to her furosemide which she does not take daily due to the inconvenience

## 2023-01-26 NOTE — REASON FOR VISIT
[FreeTextEntry1] : pt takes amiodarone 100mg po qod\par toresmide 20mg po qd\par eliquis 2.5mg po qd\par pt has dyspnea on exertion \par Patient with multiple myeloma receiving treatments every 2 weeks patient had severe mitral regurgitation underwent a mitral valve clip with some improvement\par \par Patient with history of atrial fibrillation status post ablation on Eliquis in normal sinus rhythm

## 2023-02-16 ENCOUNTER — APPOINTMENT (OUTPATIENT)
Dept: HEART AND VASCULAR | Facility: CLINIC | Age: 88
End: 2023-02-16
Payer: MEDICARE

## 2023-02-16 VITALS
HEIGHT: 61 IN | WEIGHT: 116 LBS | SYSTOLIC BLOOD PRESSURE: 170 MMHG | TEMPERATURE: 97.3 F | BODY MASS INDEX: 21.9 KG/M2 | DIASTOLIC BLOOD PRESSURE: 68 MMHG | HEART RATE: 65 BPM

## 2023-02-16 PROCEDURE — 99214 OFFICE O/P EST MOD 30 MIN: CPT | Mod: 25

## 2023-02-16 PROCEDURE — 93000 ELECTROCARDIOGRAM COMPLETE: CPT

## 2023-02-17 NOTE — DISCUSSION/SUMMARY
[FreeTextEntry1] : Patient with multiple medical problems active problems are 1 multiple myeloma for which she gets treatment every 2 weeks by Dr. Giron\par \par Patient has a mitral valve clip for severe mitral regurgitation but has residual moderate mitral regurg\par \par Patient has paroxysmal atrial fibrillation patient's status post ablation patient is on Eliquis and 100 mg of amiodarone as of amiodarone every other day day patient was in sinus rhythm today today today\par \par Patient has improved on fariga increase from 5 to 10mg f/u lytes with Dr Giron d/w\par pt with lower lid erythema d/w Shelby

## 2023-02-17 NOTE — REASON FOR VISIT
[FreeTextEntry1] : pt takes amiodarone 100mg po qod\par toresmide 20mg po qd\par eliquis 2.5mg po qd\par pt has dyspnea on exertion \par Patient with multiple myeloma receiving treatments every 2 weeks patient had severe mitral regurgitation underwent a mitral valve clip with some improvement\par \par Patient with history of atrial fibrillation status post ablation on Eliquis in normal sinus rhythm\par patient started on farxiga with improvement in ex tolerance

## 2023-02-17 NOTE — REVIEW OF SYSTEMS
[Feeling Fatigued] : feeling fatigued [Dyspnea on exertion] : dyspnea during exertion [Lower Ext Edema] : lower extremity edema [Dizziness] : dizziness [Negative] : ENT [de-identified] : visual disturbance transient

## 2023-03-06 NOTE — ED ADULT NURSE NOTE - NEURO BEHAVIOR
PAST MEDICAL HISTORY:  Drug abuse     Exposure to COVID-19 virus     Hepatitis C     Hypertension     Hypothyroid     Schizophrenia       
calm

## 2023-03-30 ENCOUNTER — NON-APPOINTMENT (OUTPATIENT)
Age: 88
End: 2023-03-30

## 2023-03-30 ENCOUNTER — APPOINTMENT (OUTPATIENT)
Dept: HEART AND VASCULAR | Facility: CLINIC | Age: 88
End: 2023-03-30
Payer: MEDICARE

## 2023-03-30 VITALS
WEIGHT: 115 LBS | DIASTOLIC BLOOD PRESSURE: 65 MMHG | HEIGHT: 61 IN | BODY MASS INDEX: 21.71 KG/M2 | TEMPERATURE: 97.2 F | SYSTOLIC BLOOD PRESSURE: 168 MMHG

## 2023-03-30 PROCEDURE — 99214 OFFICE O/P EST MOD 30 MIN: CPT | Mod: 25

## 2023-03-30 PROCEDURE — 93000 ELECTROCARDIOGRAM COMPLETE: CPT

## 2023-03-30 NOTE — REVIEW OF SYSTEMS
[Feeling Fatigued] : feeling fatigued [Dyspnea on exertion] : dyspnea during exertion [Lower Ext Edema] : lower extremity edema [Dizziness] : dizziness [Negative] : ENT [de-identified] : visual disturbance transient

## 2023-03-30 NOTE — DISCUSSION/SUMMARY
[FreeTextEntry1] : Patient with multiple medical problems active problems are 1 multiple myeloma for which she gets treatment every 2 weeks by Dr. Giron\par \par Patient has a mitral valve clip for severe mitral regurgitation but has residual moderate mitral regurg\par \par Patient has paroxysmal atrial fibrillation patient's status post ablation patient is on Eliquis and 100 mg of amiodarone as of amiodarone every other day day patient was in sinus rhythm today today today\par \par Patient has improved on fariga increase from 5 to 10mg f/u lytes with Dr Giron d/w\par pt with lower lid erythema d/w Shelby\par raynauds start amlodipine

## 2023-03-30 NOTE — REASON FOR VISIT
[FreeTextEntry1] : pt takes amiodarone 100mg po qod\par toresmide 20mg po occ\par eliquis 2.5mg po qd\par pt has dyspnea on exertion \par Patient with multiple myeloma receiving treatments every 2 weeks patient had severe mitral regurgitation underwent a mitral valve clip with some improvement\par \par Patient with history of atrial fibrillation status post ablation on Eliquis in normal sinus rhythm\par patient started on farxiga with improvement in ex tolerance\par \par patient with blue fingers

## 2023-04-25 ENCOUNTER — NON-APPOINTMENT (OUTPATIENT)
Age: 88
End: 2023-04-25

## 2023-04-25 ENCOUNTER — APPOINTMENT (OUTPATIENT)
Dept: HEART AND VASCULAR | Facility: CLINIC | Age: 88
End: 2023-04-25
Payer: MEDICARE

## 2023-04-25 VITALS
SYSTOLIC BLOOD PRESSURE: 147 MMHG | DIASTOLIC BLOOD PRESSURE: 70 MMHG | OXYGEN SATURATION: 93 % | BODY MASS INDEX: 21.71 KG/M2 | HEIGHT: 61 IN | HEART RATE: 64 BPM | WEIGHT: 115 LBS | TEMPERATURE: 96.2 F

## 2023-04-25 VITALS — SYSTOLIC BLOOD PRESSURE: 152 MMHG | DIASTOLIC BLOOD PRESSURE: 71 MMHG

## 2023-04-25 PROCEDURE — 93000 ELECTROCARDIOGRAM COMPLETE: CPT

## 2023-04-25 PROCEDURE — 99214 OFFICE O/P EST MOD 30 MIN: CPT | Mod: 25

## 2023-04-25 NOTE — DISCUSSION/SUMMARY
[FreeTextEntry1] : Patient with multiple medical problems active problems are 1 multiple myeloma for which she gets treatment every 2 weeks by Dr. Giron\par \par Patient has a mitral valve clip for severe mitral regurgitation but has residual moderate mitral regurg\par \par Patient has paroxysmal atrial fibrillation patient's status post ablation patient is on Eliquis and 100 mg of amiodarone every other day day patient was in sinus rhythm today \par \par Patient has improved on fariga increase from 5 to 10mg f/u lytes with Dr Giron d/w\par pt with lower lid erythema d/w Shelby\par raynauds start amlodipine\par pt with uri productive cough start augumentum

## 2023-04-25 NOTE — REVIEW OF SYSTEMS
[Feeling Fatigued] : feeling fatigued [Dyspnea on exertion] : dyspnea during exertion [Lower Ext Edema] : lower extremity edema [Dizziness] : dizziness [Negative] : ENT [de-identified] : visual disturbance transient

## 2023-05-02 NOTE — ED PROVIDER NOTE - MEDICAL DECISION MAKING DETAILS
Flexible Sigmoidoscopy      DIET:  Resume your usual diet.      ACTIVITY:  Rest quietly at home for the remainder of the day. You may resume normal activities tomorrow.  Do not do anything that requires coordination the day of the procedure, such as climbing ladders, using a sharp knife or operating machinery.   Do not drive until tomorrow morning.                       Resume normal medications.       WHAT TO EXPECT:  Mild abdominal discomfort, bloating and gas pains.     A scant amount of rectal bleeding following a biopsy, polypectomy or if you have hemorrhoids, is normal.    Return of your usual bowel movements in 1-2 days.    If  you had a polyp removed or biopsy performed, these specimens will be sent to the pathology laboratory.        Patient/Family given For your Well Being Teaching Sheet:  ___ Care After Anesthesia/ Sedation  ___ Pain Management Tips  ___ About Surgical Site infection  ___ Smoking and second hand Smoke information  ___ Other:       PROBLEMS TO WATCH FOR--NOTIFY YOUR SURGEON IF YOU HAVE ANY OF THESE SYMPTOMS:    Severe abdominal pain or bloating.     Chills or temperature over 101 degrees.    Large amount of bright red rectal bleeding, blood clots or black colored stool.    Vomiting blood or black colored liquid.         Recommendations  - Await pathology.  - Repeat colonoscopy pending pathology results.       Want to Say “Thank You” to a Nurse?  The [employer] Award® was created in memory of NELSON Patel by his family to say thank you to bedside nurses who provide an outstanding level of care.  Submit a nomination using any method below.     OR    [email or web address]       Homa code, pt with difficulty walking and imbalance since 2pm yesterday, cts negative, labs show mild dehydration, Dr. Curiel saw and examined patient, she is ataxic with dysmetria per his exam, suspicious for stoke, will given asa, statin, pt to be admitted to [facility] for further workup.

## 2023-06-28 ENCOUNTER — RX RENEWAL (OUTPATIENT)
Age: 88
End: 2023-06-28

## 2023-07-10 ENCOUNTER — RX RENEWAL (OUTPATIENT)
Age: 88
End: 2023-07-10

## 2023-07-12 ENCOUNTER — NON-APPOINTMENT (OUTPATIENT)
Age: 88
End: 2023-07-12

## 2023-07-12 ENCOUNTER — APPOINTMENT (OUTPATIENT)
Dept: HEART AND VASCULAR | Facility: CLINIC | Age: 88
End: 2023-07-12
Payer: MEDICARE

## 2023-07-12 VITALS
HEART RATE: 66 BPM | DIASTOLIC BLOOD PRESSURE: 58 MMHG | WEIGHT: 115 LBS | SYSTOLIC BLOOD PRESSURE: 118 MMHG | HEIGHT: 61 IN | OXYGEN SATURATION: 96 % | BODY MASS INDEX: 21.71 KG/M2

## 2023-07-12 PROCEDURE — 93000 ELECTROCARDIOGRAM COMPLETE: CPT

## 2023-07-12 PROCEDURE — 99214 OFFICE O/P EST MOD 30 MIN: CPT | Mod: 25

## 2023-07-12 PROCEDURE — 93306 TTE W/DOPPLER COMPLETE: CPT

## 2023-07-12 NOTE — REASON FOR VISIT
[FreeTextEntry1] : pt takes amiodarone 100mg po qod\par toresmide 20mg po not taking\par eliquis 2.5mg po qd\par pt has dyspnea on exertion \par Patient with multiple myeloma receiving treatments every 2 weeks patient had severe mitral regurgitation underwent a mitral valve clip with some improvement\par \par Patient with history of atrial fibrillation status post ablation on Eliquis in normal sinus rhythm\par patient started on farxiga with improvement in ex tolerance\par pt presents today with 3 plus edema of the lower ext\par \par patient with blue fingers

## 2023-07-12 NOTE — REVIEW OF SYSTEMS
[Feeling Fatigued] : feeling fatigued [Dyspnea on exertion] : dyspnea during exertion [Lower Ext Edema] : lower extremity edema [Dizziness] : dizziness [Negative] : ENT [de-identified] : visual disturbance transient

## 2023-07-12 NOTE — DISCUSSION/SUMMARY
[FreeTextEntry1] : Patient with multiple medical problems active problems are 1 multiple myeloma for which she gets treatment every 2 weeks by Dr. Giron\par \par Patient has a mitral valve clip for severe mitral regurgitation but has residual moderate mitral regurg\par \par Patient has paroxysmal atrial fibrillation patient's status post ablation patient is on Eliquis and 100 mg of amiodarone every other day day patient was in sinus rhythm today \par \par Patient has improved on fariga  10mg f/u lytes with Dr Giron d/w\par Patient has increasing bilateral lower extremity edema and echocardiogram today shows moderate mitral regurg lateral to the clip and moderate tricuspid regurgitation patient has not been taking her furosemide before further intervention on her mitral and tricuspid valves I would like her to take her torsemide daily of note patient is not getting short of breath and her lungs were clear

## 2023-07-12 NOTE — PHYSICAL EXAM
[Well Developed] : well developed [Well Nourished] : well nourished [No Acute Distress] : no acute distress [Normal Conjunctiva] : normal conjunctiva [Normal Venous Pressure] : normal venous pressure [No Carotid Bruit] : no carotid bruit [Normal S1, S2] : normal S1, S2 [No Rub] : no rub [No Gallop] : no gallop [Clear Lung Fields] : clear lung fields [Good Air Entry] : good air entry [No Respiratory Distress] : no respiratory distress  [Soft] : abdomen soft [Non Tender] : non-tender [No Masses/organomegaly] : no masses/organomegaly [Normal Bowel Sounds] : normal bowel sounds [Normal Gait] : normal gait [No Cyanosis] : no cyanosis [No Clubbing] : no clubbing [No Varicosities] : no varicosities [Edema ___] : edema [unfilled] [No Rash] : no rash [No Skin Lesions] : no skin lesions [Moves all extremities] : moves all extremities [No Focal Deficits] : no focal deficits [Normal Speech] : normal speech [Alert and Oriented] : alert and oriented [Normal memory] : normal memory [de-identified] : 2/6 allison [de-identified] : skin discolored

## 2023-09-13 ENCOUNTER — APPOINTMENT (OUTPATIENT)
Dept: HEART AND VASCULAR | Facility: CLINIC | Age: 88
End: 2023-09-13
Payer: MEDICARE

## 2023-09-13 VITALS
OXYGEN SATURATION: 91 % | HEART RATE: 61 BPM | SYSTOLIC BLOOD PRESSURE: 134 MMHG | BODY MASS INDEX: 20.2 KG/M2 | WEIGHT: 107 LBS | DIASTOLIC BLOOD PRESSURE: 72 MMHG | HEIGHT: 61 IN

## 2023-09-13 PROCEDURE — 36415 COLL VENOUS BLD VENIPUNCTURE: CPT

## 2023-09-13 PROCEDURE — 99214 OFFICE O/P EST MOD 30 MIN: CPT

## 2023-09-13 PROCEDURE — 93000 ELECTROCARDIOGRAM COMPLETE: CPT

## 2023-09-13 RX ORDER — AMOXICILLIN 500 MG/1
500 TABLET, FILM COATED ORAL
Qty: 4 | Refills: 1 | Status: DISCONTINUED | COMMUNITY
Start: 2021-07-27 | End: 2023-09-13

## 2023-09-13 RX ORDER — AMOXICILLIN AND CLAVULANATE POTASSIUM 500; 125 MG/1; MG/1
500-125 TABLET, FILM COATED ORAL
Qty: 20 | Refills: 0 | Status: DISCONTINUED | COMMUNITY
Start: 2023-04-25 | End: 2023-09-13

## 2023-09-13 RX ORDER — CEPHALEXIN 500 MG/1
500 CAPSULE ORAL 3 TIMES DAILY
Qty: 30 | Refills: 0 | Status: DISCONTINUED | COMMUNITY
Start: 2021-11-30 | End: 2023-09-13

## 2023-09-13 RX ORDER — LETROZOLE TABLETS 2.5 MG/1
2.5 TABLET, FILM COATED ORAL
Qty: 90 | Refills: 1 | Status: DISCONTINUED | COMMUNITY
Start: 2022-10-13 | End: 2023-09-13

## 2023-09-29 NOTE — ED ADULT NURSE NOTE - NS ED NURSE DISCH DISPOSITION
SW/CM Discharge Plan  Anticipate that patient will be medically cleared for discharge today. Patient’s discharge destination is Home. Patient to be picked up by  Family. Patient/family aware and agreeable to discharge plan.  Discharge plan communicated to MD, RN and CLIFFORD.        Discharged

## 2023-10-06 ENCOUNTER — RX RENEWAL (OUTPATIENT)
Age: 88
End: 2023-10-06

## 2023-10-06 NOTE — PATIENT PROFILE ADULT - TRANSPORTATION
Left message for patient to arrive at 1030 at [facility] on 10/9/2023 for her procedure at 1200. IV order is in epic. no

## 2023-10-10 ENCOUNTER — RX RENEWAL (OUTPATIENT)
Age: 88
End: 2023-10-10

## 2023-10-10 RX ORDER — TORSEMIDE 20 MG/1
20 TABLET ORAL
Qty: 30 | Refills: 0 | Status: ACTIVE | COMMUNITY
Start: 2023-07-10 | End: 1900-01-01

## 2023-10-10 RX ORDER — LEVOTHYROXINE SODIUM 0.07 MG/1
75 TABLET ORAL DAILY
Qty: 90 | Refills: 2 | Status: ACTIVE | COMMUNITY
Start: 1900-01-01 | End: 1900-01-01

## 2023-11-15 ENCOUNTER — MED ADMIN CHARGE (OUTPATIENT)
Age: 88
End: 2023-11-15

## 2023-11-15 ENCOUNTER — APPOINTMENT (OUTPATIENT)
Dept: HEART AND VASCULAR | Facility: CLINIC | Age: 88
End: 2023-11-15
Payer: MEDICARE

## 2023-11-15 VITALS
HEIGHT: 61 IN | TEMPERATURE: 97 F | DIASTOLIC BLOOD PRESSURE: 64 MMHG | BODY MASS INDEX: 20.77 KG/M2 | OXYGEN SATURATION: 98 % | HEART RATE: 62 BPM | SYSTOLIC BLOOD PRESSURE: 138 MMHG | WEIGHT: 110 LBS

## 2023-11-15 DIAGNOSIS — I50.32 CHRONIC DIASTOLIC (CONGESTIVE) HEART FAILURE: ICD-10-CM

## 2023-11-15 DIAGNOSIS — E53.8 DEFICIENCY OF OTHER SPECIFIED B GROUP VITAMINS: ICD-10-CM

## 2023-11-15 DIAGNOSIS — Z00.00 ENCOUNTER FOR GENERAL ADULT MEDICAL EXAMINATION W/OUT ABNORMAL FINDINGS: ICD-10-CM

## 2023-11-15 PROCEDURE — 99214 OFFICE O/P EST MOD 30 MIN: CPT

## 2023-11-15 PROCEDURE — 93000 ELECTROCARDIOGRAM COMPLETE: CPT

## 2023-11-15 RX ORDER — CYANOCOBALAMIN 1000 UG/ML
1000 INJECTION INTRAMUSCULAR; SUBCUTANEOUS
Qty: 0 | Refills: 0 | Status: COMPLETED | OUTPATIENT
Start: 2023-11-15

## 2023-11-15 RX ORDER — AMLODIPINE BESYLATE 2.5 MG/1
2.5 TABLET ORAL
Qty: 90 | Refills: 0 | Status: DISCONTINUED | COMMUNITY
Start: 2023-03-30 | End: 2023-11-15

## 2023-11-15 RX ADMIN — CYANOCOBALAMIN 0 MCG/ML: 1000 INJECTION INTRAMUSCULAR; SUBCUTANEOUS at 00:00

## 2023-11-16 ENCOUNTER — LABORATORY RESULT (OUTPATIENT)
Age: 88
End: 2023-11-16

## 2023-11-28 ENCOUNTER — APPOINTMENT (OUTPATIENT)
Dept: HEART AND VASCULAR | Facility: CLINIC | Age: 88
End: 2023-11-28

## 2023-12-07 ENCOUNTER — APPOINTMENT (OUTPATIENT)
Dept: HEART AND VASCULAR | Facility: CLINIC | Age: 88
End: 2023-12-07
Payer: MEDICARE

## 2023-12-07 VITALS
DIASTOLIC BLOOD PRESSURE: 52 MMHG | BODY MASS INDEX: 20.58 KG/M2 | OXYGEN SATURATION: 95 % | HEIGHT: 61 IN | WEIGHT: 109 LBS | SYSTOLIC BLOOD PRESSURE: 168 MMHG | TEMPERATURE: 97 F | HEART RATE: 61 BPM

## 2023-12-07 PROCEDURE — 36415 COLL VENOUS BLD VENIPUNCTURE: CPT

## 2023-12-07 PROCEDURE — 99214 OFFICE O/P EST MOD 30 MIN: CPT

## 2024-02-28 ENCOUNTER — APPOINTMENT (OUTPATIENT)
Dept: HEART AND VASCULAR | Facility: CLINIC | Age: 89
End: 2024-02-28
Payer: MEDICARE

## 2024-02-28 VITALS
DIASTOLIC BLOOD PRESSURE: 70 MMHG | BODY MASS INDEX: 20.96 KG/M2 | WEIGHT: 111 LBS | OXYGEN SATURATION: 95 % | HEART RATE: 67 BPM | SYSTOLIC BLOOD PRESSURE: 116 MMHG | HEIGHT: 61 IN | TEMPERATURE: 97.3 F

## 2024-02-28 PROCEDURE — G2211 COMPLEX E/M VISIT ADD ON: CPT

## 2024-02-28 PROCEDURE — 99214 OFFICE O/P EST MOD 30 MIN: CPT

## 2024-02-28 PROCEDURE — 93000 ELECTROCARDIOGRAM COMPLETE: CPT

## 2024-02-28 NOTE — REASON FOR VISIT
[Arrhythmia/ECG Abnorrmalities] : arrhythmia/ECG abnormalities [Structural Heart and Valve Disease] : structural heart and valve disease [Hyperlipidemia] : hyperlipidemia [Hypertension] : hypertension [FreeTextEntry1] : Pt. is a 89-year-old F with PMHx of HTN, HLD, multiple myeloma (2018), breast cancer (s/p right lumpectomy), rectal cancer (s/p radiation treatment), Afib (s/p DCCV in 05/2021, on Eliquis, with loop recorder), chronic diastolic HF and mitral regurgitation (s/p Mitraclip in 06/2021) who presents today for follow-up.

## 2024-02-28 NOTE — HISTORY OF PRESENT ILLNESS
[FreeTextEntry1] : Since her last visit, patient felt fatigued and lightheaded yesterday  Activity: Can walk a block or 2  Pt. denies any chest pain, dyspnea, orthopnea, PND, palpitations, lightheadedness, syncope or lower extremity edema.   =================================== Labs/Diagnostics:  2023 TSH: 2.39 Lipid profile: T, TC: 176, HDL: 70, LDL: 92 K/Creat: 4.8/1.09 A1c: 5.5%  Holter (2023): primary rhythm NSR; 4 SVT events (longest 8 beats, fastest 95 bpm),  Echo (2023): normal LVSF with EF 76%, mild to moderate MR, mild to moderate TR, pulmonary HTN (PASP 44 mm Hg), small pericardial effusion.

## 2024-02-28 NOTE — PHYSICAL EXAM
[Normal Conjunctiva] : normal conjunctiva [Normal Venous Pressure] : normal venous pressure [No Carotid Bruit] : no carotid bruit [Normal S1, S2] : normal S1, S2 [Normal Gait] : normal gait [Normal] : alert and oriented, normal memory [de-identified] : 2/6 systolic murmur  [de-identified] : trace ankle edema bilaterally

## 2024-02-28 NOTE — ASSESSMENT
[FreeTextEntry1] : Pt. is a 89-year-old F with PMHx of HTN, HLD, multiple myeloma (2018), breast cancer (s/p right lumpectomy), rectal cancer (s/p radiation treatment), Afib (s/p DCCV in 05/2021, on Eliquis, with loop recorder), chronic diastolic HF and mitral regurgitation (s/p Mitraclip in 06/2021) who presents today for follow-up.  Patient's EKG shows normal sinus rhythm today we will schedule her for an echocardiogram to evaluate the amount of mitral regurg with a mitral valve clip patient is both on Farxiga and torsemide she has not been taking the torsemide for a week and I think we will continue to hold her blood pressure was a bit low for her today

## 2024-02-29 ENCOUNTER — APPOINTMENT (OUTPATIENT)
Dept: HEART AND VASCULAR | Facility: CLINIC | Age: 89
End: 2024-02-29
Payer: MEDICARE

## 2024-02-29 DIAGNOSIS — R60.0 LOCALIZED EDEMA: ICD-10-CM

## 2024-02-29 DIAGNOSIS — I48.91 UNSPECIFIED ATRIAL FIBRILLATION: ICD-10-CM

## 2024-02-29 DIAGNOSIS — I34.0 NONRHEUMATIC MITRAL (VALVE) INSUFFICIENCY: ICD-10-CM

## 2024-02-29 PROCEDURE — 93306 TTE W/DOPPLER COMPLETE: CPT

## 2024-04-01 ENCOUNTER — RX RENEWAL (OUTPATIENT)
Age: 89
End: 2024-04-01

## 2024-04-01 RX ORDER — DAPAGLIFLOZIN 10 MG/1
10 TABLET, FILM COATED ORAL DAILY
Qty: 90 | Refills: 0 | Status: ACTIVE | COMMUNITY
Start: 2023-01-26 | End: 1900-01-01

## 2024-04-02 ENCOUNTER — RX RENEWAL (OUTPATIENT)
Age: 89
End: 2024-04-02

## 2024-04-02 RX ORDER — AMIODARONE HYDROCHLORIDE 200 MG/1
200 TABLET ORAL
Qty: 90 | Refills: 0 | Status: ACTIVE | COMMUNITY
Start: 2024-04-02 | End: 1900-01-01

## 2024-04-18 ENCOUNTER — APPOINTMENT (OUTPATIENT)
Dept: HEART AND VASCULAR | Facility: CLINIC | Age: 89
End: 2024-04-18

## 2024-04-22 NOTE — PHYSICAL EXAM
[Normal Conjunctiva] : normal conjunctiva [Normal Venous Pressure] : normal venous pressure [No Carotid Bruit] : no carotid bruit [Normal S1, S2] : normal S1, S2 [Normal Gait] : normal gait [Normal] : alert and oriented, normal memory [de-identified] : 2/6 systolic murmur  [de-identified] : trace ankle edema bilaterally

## 2024-04-22 NOTE — ASSESSMENT
[FreeTextEntry1] : Pt. is a 89-year-old F with PMHx of HTN, HLD, multiple myeloma (2018), breast cancer (s/p right lumpectomy), rectal cancer (s/p radiation treatment), Afib (s/p DCCV in 05/2021, on Eliquis, with loop recorder), chronic diastolic HF and mitral regurgitation (s/p Mitraclip in 06/2021) who presents today for follow-up.

## 2024-04-22 NOTE — HISTORY OF PRESENT ILLNESS
[FreeTextEntry1] : Since her last visit,   Activity:  Pt. denies any chest pain, dyspnea, orthopnea, PND, palpitations, lightheadedness, syncope or lower extremity edema.   =================================== Labs/Diagnostics:  2023 TSH: 2.39 Lipid profile: T, TC: 176, HDL: 70, LDL: 92 K/Creat: 4.8/1.09 A1c: 5.5%  Echo (2024):  CONCLUSIONS: 1. Left ventricular cavity is normal in size. Left ventricular wall thickness is normal. Left ventricular systolic function is normal with an ejection fraction of 61 % by Taylor's method of disks. There are no regional wall motion abnormalities seen. 2. There is moderate (grade 2) left ventricular diastolic dysfunction, with elevated filling pressure. 3. Normal right ventricular cavity size, with wall thickness, and normal systolic function. 4. An annuloplasty ring is noted in the mitral position. There is centrally directed moderate intravalvular mitral regurgitation. 5. Estimated pulmonary artery systolic pressure is 53 mmHg, consistent with moderate pulmonary hypertension. 6. The left atrium is severely dilated. 7. The right atrium is severely dilated. 8. No pericardial effusion seen.  Holter (2023): primary rhythm NSR; 4 SVT events (longest 8 beats, fastest 95 bpm),  Echo (2023): normal LVSF with EF 76%, mild to moderate MR, mild to moderate TR, pulmonary HTN (PASP 44 mm Hg), small pericardial effusion.

## 2024-06-12 ENCOUNTER — APPOINTMENT (OUTPATIENT)
Dept: HEART AND VASCULAR | Facility: CLINIC | Age: 89
End: 2024-06-12
Payer: MEDICARE

## 2024-06-12 VITALS
DIASTOLIC BLOOD PRESSURE: 69 MMHG | WEIGHT: 111 LBS | BODY MASS INDEX: 20.96 KG/M2 | SYSTOLIC BLOOD PRESSURE: 175 MMHG | HEIGHT: 61 IN | HEART RATE: 64 BPM

## 2024-06-12 VITALS — DIASTOLIC BLOOD PRESSURE: 64 MMHG | SYSTOLIC BLOOD PRESSURE: 150 MMHG

## 2024-06-12 PROCEDURE — 99214 OFFICE O/P EST MOD 30 MIN: CPT | Mod: 25

## 2024-06-12 PROCEDURE — 96372 THER/PROPH/DIAG INJ SC/IM: CPT

## 2024-06-12 PROCEDURE — 93000 ELECTROCARDIOGRAM COMPLETE: CPT

## 2024-06-12 RX ADMIN — CYANOCOBALAMIN 0 MCG/ML: 1000 INJECTION INTRAMUSCULAR; SUBCUTANEOUS at 00:00

## 2024-06-12 NOTE — PHYSICAL EXAM
[Normal Conjunctiva] : normal conjunctiva [Normal Venous Pressure] : normal venous pressure [No Carotid Bruit] : no carotid bruit [Normal S1, S2] : normal S1, S2 [Normal Gait] : normal gait [Normal] : alert and oriented, normal memory [de-identified] : 2/6 systolic murmur  [de-identified] : trace ankle edema bilaterally

## 2024-06-12 NOTE — ASSESSMENT
[FreeTextEntry1] : Pt. is a 89-year-old F with PMHx of HTN, HLD, multiple myeloma (2018), breast cancer (s/p right lumpectomy), rectal cancer (s/p radiation treatment), Afib (s/p DCCV in 05/2021, on Eliquis, with loop recorder), chronic diastolic HF and mitral regurgitation (s/p Mitraclip in 06/2021) who presents today for follow-up.  Fatigue patient is followed by Dr. Lechuga patient has multiple myeloma will discuss blood work with him patient is in sinus rhythm with acceptable blood pressures  Paroxysmal atrial fibrillation patient is on Eliquis and 100 mg of amiodarone twice daily Eliquis dose is 2.5 twice a day Mitral valve clip patient still has moderate mitral regurgitation patient is on Farxiga 10 mg p.o. twice daily and keep additionally takes furosemide 20 mg

## 2024-06-12 NOTE — HISTORY OF PRESENT ILLNESS
[FreeTextEntry1] : Since her last visit, patient has days where she feels she cannot get out of bed  Activity: Limited  Pt. denies any chest pain, dyspnea, orthopnea, PND, palpitations, lightheadedness, syncope or lower extremity edema.   =================================== Labs/Diagnostics:  2023 TSH: 2.39 Lipid profile: T, TC: 176, HDL: 70, LDL: 92 K/Creat: 4.8/1.09 A1c: 5.5%  Echo (2024): CONCLUSIONS: 1. Left ventricular cavity is normal in size. Left ventricular wall thickness is normal. Left ventricular systolic function is normal with an ejection fraction of 61 % by Taylor's method of disks. There are no regional wall motion abnormalities seen. 2. There is moderate (grade 2) left ventricular diastolic dysfunction, with elevated filling pressure. 3. Normal right ventricular cavity size, with wall thickness, and normal systolic function. 4. An annuloplasty ring is noted in the mitral position. There is centrally directed moderate intravalvular mitral regurgitation. 5. Estimated pulmonary artery systolic pressure is 53 mmHg, consistent with moderate pulmonary hypertension. 6. The left atrium is severely dilated. 7. The right atrium is severely dilated. 8. No pericardial effusion seen.  Holter (2023): primary rhythm NSR; 4 SVT events (longest 8 beats, fastest 95 bpm),  Echo (2023): normal LVSF with EF 76%, mild to moderate MR, mild to moderate TR, pulmonary HTN (PASP 44 mm Hg), small pericardial effusion.

## 2024-07-01 ENCOUNTER — INPATIENT (INPATIENT)
Facility: HOSPITAL | Age: 89
LOS: 2 days | Discharge: NURSING, REHAB OR LONG-TERM CARE | DRG: 378 | End: 2024-07-04
Attending: INTERNAL MEDICINE | Admitting: INTERNAL MEDICINE
Payer: MEDICARE

## 2024-07-01 VITALS
OXYGEN SATURATION: 99 % | TEMPERATURE: 97 F | DIASTOLIC BLOOD PRESSURE: 56 MMHG | RESPIRATION RATE: 19 BRPM | WEIGHT: 110.01 LBS | HEART RATE: 79 BPM | SYSTOLIC BLOOD PRESSURE: 96 MMHG | HEIGHT: 61 IN

## 2024-07-01 DIAGNOSIS — Z29.9 ENCOUNTER FOR PROPHYLACTIC MEASURES, UNSPECIFIED: ICD-10-CM

## 2024-07-01 DIAGNOSIS — K92.1 MELENA: ICD-10-CM

## 2024-07-01 DIAGNOSIS — C90.00 MULTIPLE MYELOMA NOT HAVING ACHIEVED REMISSION: ICD-10-CM

## 2024-07-01 DIAGNOSIS — I48.20 CHRONIC ATRIAL FIBRILLATION, UNSPECIFIED: ICD-10-CM

## 2024-07-01 DIAGNOSIS — N17.9 ACUTE KIDNEY FAILURE, UNSPECIFIED: ICD-10-CM

## 2024-07-01 DIAGNOSIS — I50.9 HEART FAILURE, UNSPECIFIED: ICD-10-CM

## 2024-07-01 LAB
A1C WITH ESTIMATED AVERAGE GLUCOSE RESULT: 5.4 % — SIGNIFICANT CHANGE UP (ref 4–5.6)
ALBUMIN SERPL ELPH-MCNC: 3.4 G/DL — SIGNIFICANT CHANGE UP (ref 3.3–5)
ALBUMIN SERPL ELPH-MCNC: 3.7 G/DL — SIGNIFICANT CHANGE UP (ref 3.3–5)
ALP SERPL-CCNC: 77 U/L — SIGNIFICANT CHANGE UP (ref 40–120)
ALP SERPL-CCNC: 85 U/L — SIGNIFICANT CHANGE UP (ref 40–120)
ALT FLD-CCNC: 11 U/L — SIGNIFICANT CHANGE UP (ref 10–45)
ALT FLD-CCNC: 9 U/L — LOW (ref 10–45)
ANION GAP SERPL CALC-SCNC: 9 MMOL/L — SIGNIFICANT CHANGE UP (ref 5–17)
ANION GAP SERPL CALC-SCNC: 9 MMOL/L — SIGNIFICANT CHANGE UP (ref 5–17)
APTT BLD: 34.7 SEC — SIGNIFICANT CHANGE UP (ref 24.5–35.6)
AST SERPL-CCNC: 18 U/L — SIGNIFICANT CHANGE UP (ref 10–40)
AST SERPL-CCNC: 19 U/L — SIGNIFICANT CHANGE UP (ref 10–40)
BASOPHILS # BLD AUTO: 0.04 K/UL — SIGNIFICANT CHANGE UP (ref 0–0.2)
BASOPHILS NFR BLD AUTO: 0.5 % — SIGNIFICANT CHANGE UP (ref 0–2)
BILIRUB SERPL-MCNC: 0.6 MG/DL — SIGNIFICANT CHANGE UP (ref 0.2–1.2)
BILIRUB SERPL-MCNC: 0.6 MG/DL — SIGNIFICANT CHANGE UP (ref 0.2–1.2)
BLD GP AB SCN SERPL QL: NEGATIVE — SIGNIFICANT CHANGE UP
BUN SERPL-MCNC: 24 MG/DL — HIGH (ref 7–23)
BUN SERPL-MCNC: 26 MG/DL — HIGH (ref 7–23)
CALCIUM SERPL-MCNC: 8.5 MG/DL — SIGNIFICANT CHANGE UP (ref 8.4–10.5)
CALCIUM SERPL-MCNC: 9.6 MG/DL — SIGNIFICANT CHANGE UP (ref 8.4–10.5)
CHLORIDE SERPL-SCNC: 107 MMOL/L — SIGNIFICANT CHANGE UP (ref 96–108)
CHLORIDE SERPL-SCNC: 110 MMOL/L — HIGH (ref 96–108)
CO2 SERPL-SCNC: 23 MMOL/L — SIGNIFICANT CHANGE UP (ref 22–31)
CO2 SERPL-SCNC: 25 MMOL/L — SIGNIFICANT CHANGE UP (ref 22–31)
CREAT SERPL-MCNC: 1.16 MG/DL — SIGNIFICANT CHANGE UP (ref 0.5–1.3)
CREAT SERPL-MCNC: 1.36 MG/DL — HIGH (ref 0.5–1.3)
EGFR: 37 ML/MIN/1.73M2 — LOW
EGFR: 44 ML/MIN/1.73M2 — LOW
EOSINOPHIL # BLD AUTO: 0.01 K/UL — SIGNIFICANT CHANGE UP (ref 0–0.5)
EOSINOPHIL NFR BLD AUTO: 0.1 % — SIGNIFICANT CHANGE UP (ref 0–6)
ESTIMATED AVERAGE GLUCOSE: 108 MG/DL — SIGNIFICANT CHANGE UP (ref 68–114)
GLUCOSE SERPL-MCNC: 106 MG/DL — HIGH (ref 70–99)
GLUCOSE SERPL-MCNC: 127 MG/DL — HIGH (ref 70–99)
HCT VFR BLD CALC: 33.7 % — LOW (ref 34.5–45)
HCT VFR BLD CALC: 37 % — SIGNIFICANT CHANGE UP (ref 34.5–45)
HGB BLD-MCNC: 10.7 G/DL — LOW (ref 11.5–15.5)
HGB BLD-MCNC: 11.7 G/DL — SIGNIFICANT CHANGE UP (ref 11.5–15.5)
IMM GRANULOCYTES NFR BLD AUTO: 0.3 % — SIGNIFICANT CHANGE UP (ref 0–0.9)
INR BLD: 1.4 — HIGH (ref 0.85–1.18)
LACTATE SERPL-SCNC: 1.3 MMOL/L — SIGNIFICANT CHANGE UP (ref 0.5–2)
LYMPHOCYTES # BLD AUTO: 1.12 K/UL — SIGNIFICANT CHANGE UP (ref 1–3.3)
LYMPHOCYTES # BLD AUTO: 12.6 % — LOW (ref 13–44)
MAGNESIUM SERPL-MCNC: 2.1 MG/DL — SIGNIFICANT CHANGE UP (ref 1.6–2.6)
MCHC RBC-ENTMCNC: 28.2 PG — SIGNIFICANT CHANGE UP (ref 27–34)
MCHC RBC-ENTMCNC: 28.3 PG — SIGNIFICANT CHANGE UP (ref 27–34)
MCHC RBC-ENTMCNC: 31.6 GM/DL — LOW (ref 32–36)
MCHC RBC-ENTMCNC: 31.8 GM/DL — LOW (ref 32–36)
MCV RBC AUTO: 88.7 FL — SIGNIFICANT CHANGE UP (ref 80–100)
MCV RBC AUTO: 89.4 FL — SIGNIFICANT CHANGE UP (ref 80–100)
MONOCYTES # BLD AUTO: 0.65 K/UL — SIGNIFICANT CHANGE UP (ref 0–0.9)
MONOCYTES NFR BLD AUTO: 7.3 % — SIGNIFICANT CHANGE UP (ref 2–14)
NEUTROPHILS # BLD AUTO: 7.01 K/UL — SIGNIFICANT CHANGE UP (ref 1.8–7.4)
NEUTROPHILS NFR BLD AUTO: 79.2 % — HIGH (ref 43–77)
NRBC # BLD: 0 /100 WBCS — SIGNIFICANT CHANGE UP (ref 0–0)
NRBC # BLD: 0 /100 WBCS — SIGNIFICANT CHANGE UP (ref 0–0)
PHOSPHATE SERPL-MCNC: 3.2 MG/DL — SIGNIFICANT CHANGE UP (ref 2.5–4.5)
PLATELET # BLD AUTO: 171 K/UL — SIGNIFICANT CHANGE UP (ref 150–400)
PLATELET # BLD AUTO: 182 K/UL — SIGNIFICANT CHANGE UP (ref 150–400)
POTASSIUM SERPL-MCNC: 4 MMOL/L — SIGNIFICANT CHANGE UP (ref 3.5–5.3)
POTASSIUM SERPL-MCNC: 4.2 MMOL/L — SIGNIFICANT CHANGE UP (ref 3.5–5.3)
POTASSIUM SERPL-SCNC: 4 MMOL/L — SIGNIFICANT CHANGE UP (ref 3.5–5.3)
POTASSIUM SERPL-SCNC: 4.2 MMOL/L — SIGNIFICANT CHANGE UP (ref 3.5–5.3)
PROT SERPL-MCNC: 6.6 G/DL — SIGNIFICANT CHANGE UP (ref 6–8.3)
PROT SERPL-MCNC: 7.4 G/DL — SIGNIFICANT CHANGE UP (ref 6–8.3)
PROTHROM AB SERPL-ACNC: 15.8 SEC — HIGH (ref 9.5–13)
RBC # BLD: 3.8 M/UL — SIGNIFICANT CHANGE UP (ref 3.8–5.2)
RBC # BLD: 4.14 M/UL — SIGNIFICANT CHANGE UP (ref 3.8–5.2)
RBC # FLD: 16.5 % — HIGH (ref 10.3–14.5)
RBC # FLD: 16.7 % — HIGH (ref 10.3–14.5)
RH IG SCN BLD-IMP: POSITIVE — SIGNIFICANT CHANGE UP
SODIUM SERPL-SCNC: 141 MMOL/L — SIGNIFICANT CHANGE UP (ref 135–145)
SODIUM SERPL-SCNC: 142 MMOL/L — SIGNIFICANT CHANGE UP (ref 135–145)
WBC # BLD: 8.86 K/UL — SIGNIFICANT CHANGE UP (ref 3.8–10.5)
WBC # BLD: 9.2 K/UL — SIGNIFICANT CHANGE UP (ref 3.8–10.5)
WBC # FLD AUTO: 8.86 K/UL — SIGNIFICANT CHANGE UP (ref 3.8–10.5)
WBC # FLD AUTO: 9.2 K/UL — SIGNIFICANT CHANGE UP (ref 3.8–10.5)

## 2024-07-01 PROCEDURE — 99223 1ST HOSP IP/OBS HIGH 75: CPT | Mod: GC

## 2024-07-01 PROCEDURE — 71045 X-RAY EXAM CHEST 1 VIEW: CPT | Mod: 26

## 2024-07-01 PROCEDURE — 99285 EMERGENCY DEPT VISIT HI MDM: CPT | Mod: FS

## 2024-07-01 RX ORDER — LEVOTHYROXINE SODIUM 125 MCG
1 TABLET ORAL
Qty: 0 | Refills: 0 | DISCHARGE

## 2024-07-01 RX ORDER — SODIUM CHLORIDE 0.9 % (FLUSH) 0.9 %
1000 SYRINGE (ML) INJECTION ONCE
Refills: 0 | Status: COMPLETED | OUTPATIENT
Start: 2024-07-01 | End: 2024-07-01

## 2024-07-01 RX ORDER — METOPROLOL TARTRATE 50 MG
1 TABLET ORAL
Qty: 0 | Refills: 0 | DISCHARGE

## 2024-07-01 RX ORDER — LEVOTHYROXINE SODIUM 25 MCG
1 TABLET ORAL
Refills: 0 | DISCHARGE

## 2024-07-01 RX ORDER — PANTOPRAZOLE SODIUM 40 MG/10ML
8 INJECTION, POWDER, FOR SOLUTION INTRAVENOUS
Qty: 80 | Refills: 0 | Status: DISCONTINUED | OUTPATIENT
Start: 2024-07-01 | End: 2024-07-02

## 2024-07-01 RX ORDER — AMIODARONE HYDROCHLORIDE 400 MG/1
0.5 TABLET ORAL
Refills: 0 | DISCHARGE

## 2024-07-01 RX ORDER — LETROZOLE 2.5 MG/1
1 TABLET, FILM COATED ORAL
Qty: 0 | Refills: 0 | DISCHARGE

## 2024-07-01 RX ORDER — PANTOPRAZOLE SODIUM 40 MG/10ML
80 INJECTION, POWDER, FOR SOLUTION INTRAVENOUS ONCE
Refills: 0 | Status: COMPLETED | OUTPATIENT
Start: 2024-07-01 | End: 2024-07-01

## 2024-07-01 RX ADMIN — PANTOPRAZOLE SODIUM 10 MG/HR: 40 INJECTION, POWDER, FOR SOLUTION INTRAVENOUS at 20:24

## 2024-07-01 RX ADMIN — Medication 1000 MILLILITER(S): at 19:00

## 2024-07-01 RX ADMIN — PANTOPRAZOLE SODIUM 80 MILLIGRAM(S): 40 INJECTION, POWDER, FOR SOLUTION INTRAVENOUS at 19:58

## 2024-07-01 RX ADMIN — Medication 1000 MILLILITER(S): at 22:34

## 2024-07-02 DIAGNOSIS — E03.9 HYPOTHYROIDISM, UNSPECIFIED: ICD-10-CM

## 2024-07-02 LAB
ALBUMIN SERPL ELPH-MCNC: 3.3 G/DL — SIGNIFICANT CHANGE UP (ref 3.3–5)
ALP SERPL-CCNC: 79 U/L — SIGNIFICANT CHANGE UP (ref 40–120)
ALT FLD-CCNC: 9 U/L — LOW (ref 10–45)
ANION GAP SERPL CALC-SCNC: 10 MMOL/L — SIGNIFICANT CHANGE UP (ref 5–17)
APTT BLD: 31.8 SEC — SIGNIFICANT CHANGE UP (ref 24.5–35.6)
AST SERPL-CCNC: 15 U/L — SIGNIFICANT CHANGE UP (ref 10–40)
BILIRUB SERPL-MCNC: 0.7 MG/DL — SIGNIFICANT CHANGE UP (ref 0.2–1.2)
BLD GP AB SCN SERPL QL: NEGATIVE — SIGNIFICANT CHANGE UP
BUN SERPL-MCNC: 23 MG/DL — SIGNIFICANT CHANGE UP (ref 7–23)
CALCIUM SERPL-MCNC: 8.7 MG/DL — SIGNIFICANT CHANGE UP (ref 8.4–10.5)
CHLORIDE SERPL-SCNC: 114 MMOL/L — HIGH (ref 96–108)
CO2 SERPL-SCNC: 20 MMOL/L — LOW (ref 22–31)
CREAT SERPL-MCNC: 1.11 MG/DL — SIGNIFICANT CHANGE UP (ref 0.5–1.3)
EGFR: 47 ML/MIN/1.73M2 — LOW
GLUCOSE SERPL-MCNC: 79 MG/DL — SIGNIFICANT CHANGE UP (ref 70–99)
HCT VFR BLD CALC: 35 % — SIGNIFICANT CHANGE UP (ref 34.5–45)
HGB BLD-MCNC: 10.6 G/DL — LOW (ref 11.5–15.5)
INR BLD: 1.18 — SIGNIFICANT CHANGE UP (ref 0.85–1.18)
MAGNESIUM SERPL-MCNC: 2.1 MG/DL — SIGNIFICANT CHANGE UP (ref 1.6–2.6)
MCHC RBC-ENTMCNC: 27.7 PG — SIGNIFICANT CHANGE UP (ref 27–34)
MCHC RBC-ENTMCNC: 30.3 GM/DL — LOW (ref 32–36)
MCV RBC AUTO: 91.6 FL — SIGNIFICANT CHANGE UP (ref 80–100)
NRBC # BLD: 0 /100 WBCS — SIGNIFICANT CHANGE UP (ref 0–0)
PHOSPHATE SERPL-MCNC: 3.2 MG/DL — SIGNIFICANT CHANGE UP (ref 2.5–4.5)
PLATELET # BLD AUTO: 164 K/UL — SIGNIFICANT CHANGE UP (ref 150–400)
POTASSIUM SERPL-MCNC: 4.1 MMOL/L — SIGNIFICANT CHANGE UP (ref 3.5–5.3)
POTASSIUM SERPL-SCNC: 4.1 MMOL/L — SIGNIFICANT CHANGE UP (ref 3.5–5.3)
PROT SERPL-MCNC: 6.6 G/DL — SIGNIFICANT CHANGE UP (ref 6–8.3)
PROTHROM AB SERPL-ACNC: 13.4 SEC — HIGH (ref 9.5–13)
RBC # BLD: 3.82 M/UL — SIGNIFICANT CHANGE UP (ref 3.8–5.2)
RBC # FLD: 16.7 % — HIGH (ref 10.3–14.5)
RH IG SCN BLD-IMP: POSITIVE — SIGNIFICANT CHANGE UP
SODIUM SERPL-SCNC: 144 MMOL/L — SIGNIFICANT CHANGE UP (ref 135–145)
T4 FREE+ TSH PNL SERPL: 2.05 UIU/ML — SIGNIFICANT CHANGE UP (ref 0.27–4.2)
WBC # BLD: 8.46 K/UL — SIGNIFICANT CHANGE UP (ref 3.8–10.5)
WBC # FLD AUTO: 8.46 K/UL — SIGNIFICANT CHANGE UP (ref 3.8–10.5)

## 2024-07-02 PROCEDURE — 99222 1ST HOSP IP/OBS MODERATE 55: CPT | Mod: GC

## 2024-07-02 PROCEDURE — 99233 SBSQ HOSP IP/OBS HIGH 50: CPT | Mod: GC

## 2024-07-02 RX ORDER — PANTOPRAZOLE SODIUM 40 MG/10ML
40 INJECTION, POWDER, FOR SOLUTION INTRAVENOUS EVERY 12 HOURS
Refills: 0 | Status: DISCONTINUED | OUTPATIENT
Start: 2024-07-02 | End: 2024-07-03

## 2024-07-02 RX ORDER — LEVOTHYROXINE SODIUM 25 MCG
75 TABLET ORAL DAILY
Refills: 0 | Status: DISCONTINUED | OUTPATIENT
Start: 2024-07-02 | End: 2024-07-04

## 2024-07-02 RX ADMIN — Medication 75 MICROGRAM(S): at 08:07

## 2024-07-02 RX ADMIN — PANTOPRAZOLE SODIUM 10 MG/HR: 40 INJECTION, POWDER, FOR SOLUTION INTRAVENOUS at 03:41

## 2024-07-02 RX ADMIN — PANTOPRAZOLE SODIUM 40 MILLIGRAM(S): 40 INJECTION, POWDER, FOR SOLUTION INTRAVENOUS at 11:01

## 2024-07-02 RX ADMIN — PANTOPRAZOLE SODIUM 40 MILLIGRAM(S): 40 INJECTION, POWDER, FOR SOLUTION INTRAVENOUS at 22:42

## 2024-07-03 ENCOUNTER — TRANSCRIPTION ENCOUNTER (OUTPATIENT)
Age: 89
End: 2024-07-03

## 2024-07-03 ENCOUNTER — RX RENEWAL (OUTPATIENT)
Age: 89
End: 2024-07-03

## 2024-07-03 LAB
ALBUMIN SERPL ELPH-MCNC: 3.4 G/DL — SIGNIFICANT CHANGE UP (ref 3.3–5)
ALP SERPL-CCNC: 82 U/L — SIGNIFICANT CHANGE UP (ref 40–120)
ALT FLD-CCNC: 7 U/L — LOW (ref 10–45)
ANION GAP SERPL CALC-SCNC: 12 MMOL/L — SIGNIFICANT CHANGE UP (ref 5–17)
APTT BLD: 31.5 SEC — SIGNIFICANT CHANGE UP (ref 24.5–35.6)
AST SERPL-CCNC: 15 U/L — SIGNIFICANT CHANGE UP (ref 10–40)
BILIRUB SERPL-MCNC: 0.8 MG/DL — SIGNIFICANT CHANGE UP (ref 0.2–1.2)
BUN SERPL-MCNC: 18 MG/DL — SIGNIFICANT CHANGE UP (ref 7–23)
CALCIUM SERPL-MCNC: 8.8 MG/DL — SIGNIFICANT CHANGE UP (ref 8.4–10.5)
CHLORIDE SERPL-SCNC: 110 MMOL/L — HIGH (ref 96–108)
CO2 SERPL-SCNC: 19 MMOL/L — LOW (ref 22–31)
CREAT SERPL-MCNC: 1.13 MG/DL — SIGNIFICANT CHANGE UP (ref 0.5–1.3)
EGFR: 46 ML/MIN/1.73M2 — LOW
GLUCOSE SERPL-MCNC: 71 MG/DL — SIGNIFICANT CHANGE UP (ref 70–99)
HCT VFR BLD CALC: 37.4 % — SIGNIFICANT CHANGE UP (ref 34.5–45)
HCT VFR BLD CALC: 40.2 % — SIGNIFICANT CHANGE UP (ref 34.5–45)
HGB BLD-MCNC: 11.5 G/DL — SIGNIFICANT CHANGE UP (ref 11.5–15.5)
HGB BLD-MCNC: 12.3 G/DL — SIGNIFICANT CHANGE UP (ref 11.5–15.5)
INR BLD: 1.01 — SIGNIFICANT CHANGE UP (ref 0.85–1.18)
MCHC RBC-ENTMCNC: 27.6 PG — SIGNIFICANT CHANGE UP (ref 27–34)
MCHC RBC-ENTMCNC: 28 PG — SIGNIFICANT CHANGE UP (ref 27–34)
MCHC RBC-ENTMCNC: 30.6 GM/DL — LOW (ref 32–36)
MCHC RBC-ENTMCNC: 30.7 GM/DL — LOW (ref 32–36)
MCV RBC AUTO: 89.9 FL — SIGNIFICANT CHANGE UP (ref 80–100)
MCV RBC AUTO: 91.6 FL — SIGNIFICANT CHANGE UP (ref 80–100)
NRBC # BLD: 0 /100 WBCS — SIGNIFICANT CHANGE UP (ref 0–0)
NRBC # BLD: 0 /100 WBCS — SIGNIFICANT CHANGE UP (ref 0–0)
PLATELET # BLD AUTO: 174 K/UL — SIGNIFICANT CHANGE UP (ref 150–400)
PLATELET # BLD AUTO: 191 K/UL — SIGNIFICANT CHANGE UP (ref 150–400)
POTASSIUM SERPL-MCNC: 4.1 MMOL/L — SIGNIFICANT CHANGE UP (ref 3.5–5.3)
POTASSIUM SERPL-SCNC: 4.1 MMOL/L — SIGNIFICANT CHANGE UP (ref 3.5–5.3)
PROT SERPL-MCNC: 6.8 G/DL — SIGNIFICANT CHANGE UP (ref 6–8.3)
PROTHROM AB SERPL-ACNC: 11.5 SEC — SIGNIFICANT CHANGE UP (ref 9.5–13)
RBC # BLD: 4.16 M/UL — SIGNIFICANT CHANGE UP (ref 3.8–5.2)
RBC # BLD: 4.39 M/UL — SIGNIFICANT CHANGE UP (ref 3.8–5.2)
RBC # FLD: 16.6 % — HIGH (ref 10.3–14.5)
RBC # FLD: 16.7 % — HIGH (ref 10.3–14.5)
SODIUM SERPL-SCNC: 141 MMOL/L — SIGNIFICANT CHANGE UP (ref 135–145)
WBC # BLD: 10.15 K/UL — SIGNIFICANT CHANGE UP (ref 3.8–10.5)
WBC # BLD: 12.59 K/UL — HIGH (ref 3.8–10.5)
WBC # FLD AUTO: 10.15 K/UL — SIGNIFICANT CHANGE UP (ref 3.8–10.5)
WBC # FLD AUTO: 12.59 K/UL — HIGH (ref 3.8–10.5)

## 2024-07-03 PROCEDURE — 45330 DIAGNOSTIC SIGMOIDOSCOPY: CPT | Mod: GC

## 2024-07-03 PROCEDURE — 99233 SBSQ HOSP IP/OBS HIGH 50: CPT | Mod: GC

## 2024-07-03 RX ORDER — AMIODARONE HYDROCHLORIDE 50 MG/ML
100 INJECTION, SOLUTION INTRAVENOUS ONCE
Refills: 0 | Status: COMPLETED | OUTPATIENT
Start: 2024-07-03 | End: 2024-07-03

## 2024-07-03 RX ADMIN — PANTOPRAZOLE SODIUM 40 MILLIGRAM(S): 40 INJECTION, POWDER, FOR SOLUTION INTRAVENOUS at 05:17

## 2024-07-03 RX ADMIN — AMIODARONE HYDROCHLORIDE 100 MILLIGRAM(S): 50 INJECTION, SOLUTION INTRAVENOUS at 16:19

## 2024-07-04 ENCOUNTER — TRANSCRIPTION ENCOUNTER (OUTPATIENT)
Age: 89
End: 2024-07-04

## 2024-07-04 VITALS
TEMPERATURE: 98 F | SYSTOLIC BLOOD PRESSURE: 115 MMHG | DIASTOLIC BLOOD PRESSURE: 68 MMHG | OXYGEN SATURATION: 96 % | RESPIRATION RATE: 18 BRPM | HEART RATE: 73 BPM

## 2024-07-04 LAB
ALBUMIN SERPL ELPH-MCNC: 3.1 G/DL — LOW (ref 3.3–5)
ALP SERPL-CCNC: 81 U/L — SIGNIFICANT CHANGE UP (ref 40–120)
ALT FLD-CCNC: 7 U/L — LOW (ref 10–45)
ANION GAP SERPL CALC-SCNC: 9 MMOL/L — SIGNIFICANT CHANGE UP (ref 5–17)
AST SERPL-CCNC: 14 U/L — SIGNIFICANT CHANGE UP (ref 10–40)
BILIRUB SERPL-MCNC: 0.6 MG/DL — SIGNIFICANT CHANGE UP (ref 0.2–1.2)
BUN SERPL-MCNC: 22 MG/DL — SIGNIFICANT CHANGE UP (ref 7–23)
CALCIUM SERPL-MCNC: 8.9 MG/DL — SIGNIFICANT CHANGE UP (ref 8.4–10.5)
CHLORIDE SERPL-SCNC: 107 MMOL/L — SIGNIFICANT CHANGE UP (ref 96–108)
CO2 SERPL-SCNC: 22 MMOL/L — SIGNIFICANT CHANGE UP (ref 22–31)
CREAT SERPL-MCNC: 1.15 MG/DL — SIGNIFICANT CHANGE UP (ref 0.5–1.3)
EGFR: 45 ML/MIN/1.73M2 — LOW
GLUCOSE SERPL-MCNC: 93 MG/DL — SIGNIFICANT CHANGE UP (ref 70–99)
HCT VFR BLD CALC: 35.8 % — SIGNIFICANT CHANGE UP (ref 34.5–45)
HGB BLD-MCNC: 11.5 G/DL — SIGNIFICANT CHANGE UP (ref 11.5–15.5)
MCHC RBC-ENTMCNC: 28 PG — SIGNIFICANT CHANGE UP (ref 27–34)
MCHC RBC-ENTMCNC: 32.1 GM/DL — SIGNIFICANT CHANGE UP (ref 32–36)
MCV RBC AUTO: 87.3 FL — SIGNIFICANT CHANGE UP (ref 80–100)
NRBC # BLD: 0 /100 WBCS — SIGNIFICANT CHANGE UP (ref 0–0)
PLATELET # BLD AUTO: 200 K/UL — SIGNIFICANT CHANGE UP (ref 150–400)
POTASSIUM SERPL-MCNC: 4.5 MMOL/L — SIGNIFICANT CHANGE UP (ref 3.5–5.3)
POTASSIUM SERPL-SCNC: 4.5 MMOL/L — SIGNIFICANT CHANGE UP (ref 3.5–5.3)
PROT SERPL-MCNC: 6.5 G/DL — SIGNIFICANT CHANGE UP (ref 6–8.3)
RBC # BLD: 4.1 M/UL — SIGNIFICANT CHANGE UP (ref 3.8–5.2)
RBC # FLD: 16.7 % — HIGH (ref 10.3–14.5)
SODIUM SERPL-SCNC: 138 MMOL/L — SIGNIFICANT CHANGE UP (ref 135–145)
WBC # BLD: 10.6 K/UL — HIGH (ref 3.8–10.5)
WBC # FLD AUTO: 10.6 K/UL — HIGH (ref 3.8–10.5)

## 2024-07-04 PROCEDURE — 84443 ASSAY THYROID STIM HORMONE: CPT

## 2024-07-04 PROCEDURE — 96374 THER/PROPH/DIAG INJ IV PUSH: CPT

## 2024-07-04 PROCEDURE — 36415 COLL VENOUS BLD VENIPUNCTURE: CPT

## 2024-07-04 PROCEDURE — 83036 HEMOGLOBIN GLYCOSYLATED A1C: CPT

## 2024-07-04 PROCEDURE — 85025 COMPLETE CBC W/AUTO DIFF WBC: CPT

## 2024-07-04 PROCEDURE — 83605 ASSAY OF LACTIC ACID: CPT

## 2024-07-04 PROCEDURE — 86900 BLOOD TYPING SEROLOGIC ABO: CPT

## 2024-07-04 PROCEDURE — 71045 X-RAY EXAM CHEST 1 VIEW: CPT

## 2024-07-04 PROCEDURE — 99285 EMERGENCY DEPT VISIT HI MDM: CPT

## 2024-07-04 PROCEDURE — 80053 COMPREHEN METABOLIC PANEL: CPT

## 2024-07-04 PROCEDURE — 86850 RBC ANTIBODY SCREEN: CPT

## 2024-07-04 PROCEDURE — 85610 PROTHROMBIN TIME: CPT

## 2024-07-04 PROCEDURE — 99239 HOSP IP/OBS DSCHRG MGMT >30: CPT

## 2024-07-04 PROCEDURE — 85027 COMPLETE CBC AUTOMATED: CPT

## 2024-07-04 PROCEDURE — 96361 HYDRATE IV INFUSION ADD-ON: CPT

## 2024-07-04 PROCEDURE — 85730 THROMBOPLASTIN TIME PARTIAL: CPT

## 2024-07-04 PROCEDURE — 83735 ASSAY OF MAGNESIUM: CPT

## 2024-07-04 PROCEDURE — 86901 BLOOD TYPING SEROLOGIC RH(D): CPT

## 2024-07-04 PROCEDURE — 84100 ASSAY OF PHOSPHORUS: CPT

## 2024-07-04 PROCEDURE — 97161 PT EVAL LOW COMPLEX 20 MIN: CPT

## 2024-07-04 RX ORDER — AMIODARONE HYDROCHLORIDE 50 MG/ML
1 INJECTION, SOLUTION INTRAVENOUS
Qty: 0 | Refills: 0 | DISCHARGE
Start: 2024-07-04

## 2024-07-04 RX ORDER — AMIODARONE HYDROCHLORIDE 50 MG/ML
100 INJECTION, SOLUTION INTRAVENOUS EVERY 24 HOURS
Refills: 0 | Status: DISCONTINUED | OUTPATIENT
Start: 2024-07-05 | End: 2024-07-04

## 2024-07-04 RX ADMIN — Medication 75 MICROGRAM(S): at 07:10

## 2024-07-10 DIAGNOSIS — K57.30 DIVERTICULOSIS OF LARGE INTESTINE WITHOUT PERFORATION OR ABSCESS WITHOUT BLEEDING: ICD-10-CM

## 2024-07-10 DIAGNOSIS — C90.00 MULTIPLE MYELOMA NOT HAVING ACHIEVED REMISSION: ICD-10-CM

## 2024-07-10 DIAGNOSIS — Z79.890 HORMONE REPLACEMENT THERAPY: ICD-10-CM

## 2024-07-10 DIAGNOSIS — N18.9 CHRONIC KIDNEY DISEASE, UNSPECIFIED: ICD-10-CM

## 2024-07-10 DIAGNOSIS — Z85.038 PERSONAL HISTORY OF OTHER MALIGNANT NEOPLASM OF LARGE INTESTINE: ICD-10-CM

## 2024-07-10 DIAGNOSIS — E03.9 HYPOTHYROIDISM, UNSPECIFIED: ICD-10-CM

## 2024-07-10 DIAGNOSIS — E86.1 HYPOVOLEMIA: ICD-10-CM

## 2024-07-10 DIAGNOSIS — Z85.3 PERSONAL HISTORY OF MALIGNANT NEOPLASM OF BREAST: ICD-10-CM

## 2024-07-10 DIAGNOSIS — N17.9 ACUTE KIDNEY FAILURE, UNSPECIFIED: ICD-10-CM

## 2024-07-10 DIAGNOSIS — Z86.73 PERSONAL HISTORY OF TRANSIENT ISCHEMIC ATTACK (TIA), AND CEREBRAL INFARCTION WITHOUT RESIDUAL DEFICITS: ICD-10-CM

## 2024-07-10 DIAGNOSIS — K92.1 MELENA: ICD-10-CM

## 2024-07-10 DIAGNOSIS — Z92.21 PERSONAL HISTORY OF ANTINEOPLASTIC CHEMOTHERAPY: ICD-10-CM

## 2024-07-10 DIAGNOSIS — I13.0 HYPERTENSIVE HEART AND CHRONIC KIDNEY DISEASE WITH HEART FAILURE AND STAGE 1 THROUGH STAGE 4 CHRONIC KIDNEY DISEASE, OR UNSPECIFIED CHRONIC KIDNEY DISEASE: ICD-10-CM

## 2024-07-10 DIAGNOSIS — I48.20 CHRONIC ATRIAL FIBRILLATION, UNSPECIFIED: ICD-10-CM

## 2024-07-10 DIAGNOSIS — E78.5 HYPERLIPIDEMIA, UNSPECIFIED: ICD-10-CM

## 2024-07-10 DIAGNOSIS — I50.32 CHRONIC DIASTOLIC (CONGESTIVE) HEART FAILURE: ICD-10-CM

## 2024-07-10 DIAGNOSIS — Z92.3 PERSONAL HISTORY OF IRRADIATION: ICD-10-CM

## 2024-07-10 DIAGNOSIS — Z87.891 PERSONAL HISTORY OF NICOTINE DEPENDENCE: ICD-10-CM

## 2024-08-14 ENCOUNTER — APPOINTMENT (OUTPATIENT)
Dept: HEART AND VASCULAR | Facility: CLINIC | Age: 89
End: 2024-08-14

## 2024-11-08 NOTE — ED PROVIDER NOTE - NS ED MD DISPO ADMIT [facility]
Report given to [facility] VALL[facility] at [facility]   NEUROLOGY

## 2025-02-12 NOTE — ED ADULT NURSE NOTE - CHIEF COMPLAINT
If it is improving since head trauma ok to observe   If worsening/ new symptoms should be seen and evaluated.    If one sided and started after trauma consider ENT referral  The patient is a 89y Female complaining of fall.

## 2025-02-26 NOTE — H&P ADULT - DOES THIS PATIENT HAVE A HISTORY OF OR HAS BEEN DX WITH HEART FAILURE?
James R Lachman, M.D.  Attending, Orthopaedic Surgery  [facility]  [place] Office Phone: [phone] ? Fax: [phone]  [place] Office Phone: [phone] ? Fax:[phone]    : Natacha Oviedo MA     Surgery Coordinators [place]: Shruthi Avila, [phone]  Radha Ivy, [phone]  Surgery Coordinator [place]:  Vernell Melendez, [phone]                                                       Evelyn Brown, [phone]                                                            [email or web address]   PRE-OPERATIVE AND POST-OPERATIVE INSTRUCTIONS    General Information:  Your surgery is with Dr. Lachman.  Dates can change (although rare) depending on emergencies.  Typical post operative visits are at the following intervals:  3 weeks post surgery(except 1 week for bunions and wound monitoring), 6 weeks post surgery, 3 months post surgery, 6 months post surgery, and then on a yearly basis.  However, this may change based on Dr. Lachmans’ recommendation.  #1 post-operative rule for foot/ankle surgery:  ONCE YOU ARE OUT OF YOUR CAST AND/OR REMOVABLE BOOT, SWELLING MAY PERSIST FOR MANY MONTHS.  YOU MIGHT ALSO EXPERIENCE A BLUISH DISCOLORATION OF YOUR LEG.  THIS IS NORMAL AND PART OF THE USUAL POSTOPERATIVE EXPERIENCE.    SMOKING:  Smoking results in incomplete healing of fractures (broken bones) and joints that my have been fused.  Smoking and nicotine also prevents the growth of bone into ankle replacements and bone healing.  It also slows the healing of muscles and skin (soft tissue).  Therefore, please do not have surgery if you continue to smoke.  We reserve the right to cancel your surgery if we suspect that you are smoking.  DO NOT use nicorette gum or other patches.  Please find an alternative method to quit smoking before your surgery.    Pre-Operative Information:  Surgery date and preoperative visits:  If you have  medical problems, such as an abnormal EKG, history of BLOOD CLOT, ANEURYSM, and any other heart condition, please inform us so that we can get your medical clearance several weeks before the surgery.  Please bring any important medical information, such as an EKG, chest x-ray, or echocardiogram, with you to ensure that your surgery will not be delayed.  If needed, you will receive your preoperative appointments in the mail or by phone from our scheduling office.  The location of the preoperative appointment will be given to you also.  You may not eat after midnight the night before surgery.  If you do, your surgery will be cancelled.   You will receive a phone call from your surgery center the day before your surgery (if your surgery is on a Monday, you will get a call the Friday before).   If you do not hear from someone by 4pm the day before your surgery, please call the Surgical coordinator (number above) to notify us.  Start taking Vitamin D3 4000 units per day and Calcium 1200mg per day immediately. You will continue this until your 3 month post-op visit. These are over the counter and available at all pharmacies and supermarkets.  FOR THOSE HAVING SURGERY AT [facility]- IF YOU WILL NEED CRUTCHES OR A ROLLING WALKER AFTER SURGERY, ASK FOR A PRESCRIPTION FOR THIS FROM OUR OFFICE TODAY.  THIS CANNOT BE HANDLED THE DAY OF SURGERY AS [facility] DOES NOT STOCK THESE.    Because bacterial can often enter any defect in the skin, it is important to avoid any cuts before surgery.  Any breaks in the skin on the leg will often result in your surgery being postponed.  Please avoid going on a very long walk the day prior to surgery, or doing other activities that could lead to irritation of the skin, including yard work, extra athletic activity, or shaving.   This could result in surgery cancellation.  You MUST be fasting the day of your surgery.  Therefore, please do not consume any foot or beverage  after midnight the night before surgery.  The morning of surgery you may take your usual medications with a sip of water.  It is important not to take anti-inflammatory medication like Ibuprofen, Motrin, Naproxen (Aleve), or Aspirin 7-10 days before surgery because they will make you bleed more than usual.  Vitamin, E, Plavix and Coumadin also have the same effect.  Stop Aspirin and Vitamin E two weeks before surgery.  YOUR MEDICAL DOCTOR SHOULD TELL YOU WHEN TO STOP COUMADIN OR PLAVIX.  If your surgery involves any bone healing, please do not take anti-inflammatories for at least 6 weeks after surgery.  This can impede bone healing (ibuprofen, Aleve, Relafen, iodine).  Tylenol is fine to take.    PREOPERATIVE BATHING INSTRUCTIONS:    Before your surgery, bathe with Hibiclens (4% Chlorhexidene) as instructed below.  This skin cleanser will help reduce the bacteria on your skin before surgery.  To avoid irritating your eyes, do not apply Hibiclens above the level of your neck.  On the evening before AND the morning of surgery, bathe your entire body except the face and scalp, then rinse freely.  DO NOT apply to your face or scalp, as Hibiclens can irritate your eyes.  Purchasing information:   Hibiclens is available without a prescription at most retail pharmacies.     ADDITIONAL INSTRUCTIONS:  PATIENTS HAVING FOOT/ANKLE SURGERY     In preparation for your upcoming surgery, we kindly request and advise the following:  Notify our office if you are taking any of the following:  Coumadin (warfarin):  Persantine (dipyridamole); Pletal (cilostazol); Plavix (clopidogrel); Ticlid (ticlopidine); Agrylin (anagrelide); Aggrenox (dipyridamole and aspirin) or other blood thinners,.  In addition, stop taking Vitamin E and herbal supplements.  Do not schedule any elective dental work for at least 6 months after surgery.  If you had an ankle replacement, you will need to take antibiotics before any future dental procedures. Your  dentist or our office can prescribe these for you.  1000mg of Amoxicillin 1 hour prior to any dental procedure is the recommended dosing.      THREE RULES:    After surgery you will most likely be given the instructions “KEEP YOUR TOES ABOVE YOUR NOSE.”  This means that you MUST have your feet elevated higher than your heart.  Keeping your toes above your nose helps to heal the muscles and skin (soft tissues) by reducing swelling in your leg.  This position also helps to prevent infection, and is very important in avoiding deep venous thrombosis (blood clots).    In order to keep the blood circulating in your legs and in order to avoid deep vein   thrombosis (blood clots), we ask patients to GET UP ONCE AN HOUR during the day.  This means you should at least cross the room and come back.  It does not mean you have to be up for long periods of time.  In most cases we will not have people immediately put any weight on their operated part.  This is important to prevent loosening of metal or other devices holding the bones together.  It also prevents irritation of the soft tissues which can lead to prolonged healing.  When we say get up once an hour, please walk, hop or move with an assisted device.  This is important!    Do not do any excessive walking during the first few days after surgery.  Recovering from surgery is a full-time task for the patient.  Postoperative care is important to avoid irritating the skin incision, which can lead to infection.  Please do not plan activities or go out of town for several weeks after surgery.  If you are unsure about your future activities, please schedule surgery only when you know it is acceptable for you.  Scheduling surgery and then canceling the date, prevents other people from having surgery on that date as it takes time to line everything up effectively.  If you cancel your surgery the week of your planned surgery, we reserve the right to cancel all future surgical  procedures.    THE DAY OF SURGERY:    Arrival to the hospital or outpatient surgical center on time is imperative.  If you arrive late, then your surgery will be cancelled.  You MUST have a family member/friend bring you, stay with you throughout the DURATION of your surgery, and drive you home.  You MUST be fasting the day of your surgery.  Therefore, do not consume any food or beverage after midnight the night before surgery.  At your pre-operative visit with the anesthesia staff, or during your phone screen, a nurse will instruct you what medications you will need to take the day of surgery.  MAKE SURE THAT THE PHARMACY LISTED IN THE ELECTRONIC MEDICAL RECORD (EPIC) IS YOUR PREFERRED PHARMACY. For example, if you are staying with family or a friend, and will not be near your preferred pharmacy, YOU MUST, tell the nurses checking you in the day of surgery so that this can be changed in the system.  If your prescriptions are sent to a pharmacy, this cannot be changed.      AFTER YOUR SURGERY:  Bleeding through the bandage almost always occurs.  Do not let this alarm you.  Simply add more gauze or a towel, call us, and come in for a dressing change.   If you think it is excessive, contact us immediately or go to the local emergency room.    Do not get the bandage wet.  Showering is possible with plastic protectors.   Be very careful, as the bathroom can be wet and slippery.  If you do get your dressing wet, it should be changed immediately.  Please contact us.      ONCE YOUR ARE OUT OF YOUR CAST AND/OR REMOVABLE BOOT, SWELLING MAY PERSIST FOR MANY MONTHS.  YOU MIGHT ALSO EXPERIENCE A BLUISH DISCOLORATION OF YOUR LEG.  THIS IS NORMAL AND PART OF THE USUAL POSTOPERATIVE EXPERIENCE.  WEARING COMPRESSION HOSE (ELASTIC STOCKINGS) CAN HELP AVOID SOME OF THIS SWELLING.      DRESSING:   The purpose of the surgical dressing is to keep your wound and the surgical site protected from the environment.  Most dressings contain  splints, which help to hold your foot and ankle in a corrected position, and also allow the surgical site to heal properly. Dressings will remain in place and undisturbed until the first postop visit.   If you have a drain in place, this will need to be removed in 1-3 days after surgery.  The time for the drain to be pulled will be written on your discharge instruction sheet.    CAST  INSTRUCTIONS:  You may or may not get a cast following surgery.  If you do, pay close attention to the following:    After application of a splint or cast, it is very important to elevate your leg for 24 to 72 hours.   The injured area should be elevated well above the heart.   Remember “Toes above your Nose”.  Rest and elevation greatly reduce pain and speed the healing process by minimizing early swelling.    CALL YOUR DOCTORS OFFICE OR VISIT LOCATION EMERGENCY ROOM IF YOU HAVE ANY OF THE FOLLOWING:    Significant increased pain, which may be caused by swelling, and the feeling that the splint or cast is too tight  Numbness and tingling in your hand or foot, which may be caused by too much pressure on the nerves  Burning and stinging, which may be caused by too much pressure on the skin  Excessive swelling below the cast, which may mean the cast is slowing your blood circulation  Loss of active movement of toes, which request an urgent evaluation  Loss of “capillary refill”.  Pinch the tip of toes and lexi the skin.  Release pressure and if the skin does not return pink then call the office immediately.      DO NOT GET YOUR CAST WET.   Bacteria thrive in moist dark areas.  We do not want this.   If your cast becomes wet, return to the office and we will apply another one.    PAIN AFTER SURGERY:  Narcotic pain medication can and will depress your respiratory system if taken in excess.  The goal of pain management with narcotics is to be comfortable not pain free.  If you take enough narcotics to be pain free then you run the risk of  stopping breathing.  If this happens, call 911 immediately!  Pain in the heel is often  caused by pressure from the weight of your foot on the bed.  Make sure your heel is suspended off the bed by keeping a pillow underneath your calf not your knee.    Medications:  You will be given narcotic pain medication. Do NOT drive while taking narcotic medications. Medications such as Darvocet, Percocet, Vicoden or Tylenol #3, also contain acetaminophen (Tylenol). Do not take acetaminophen or Tylenol from home when taking theses medications. When you fill your prescription, you may ask the pharmacist if your pain medication has acetaminophen/Tylenol in it. It is okay to take Tylenol with Oxycontin/Oxycodone. Should you have pain after taking your prescription medication, ibuprophen (Motrin, Advil, and Alleve) is a common over the counter preparation and may often be taken with the prescription pain medication as long as you take them with food. These medications can irritate the stomach lining.   Unless you are allergic to aspirin or currently taking a blood thinner, Dr. Lachmans’ patients are requested to take one 81 mg aspirin every 12 hours until you are back to walking normally after surgery (This can be up to 6 weeks).  Narcotic medications commonly cause nausea. Taking them with food will decrease this side effect. If you are having extreme nausea, please contact us for an alternative medication or for something that can be taken with this medication to decrease the nausea.   Also, narcotic medications frequently cause constipation. An increase of fiber, fruits and vegetables in your diet may alleviate this problem, or if necessary, you may use an over-the-counter medication such as senekot, colace, or Fibercon for constipation problems.   You should resume all medications you were taking prior to the surgery unless otherwise specified.     Activity:   Because of your recent foot surgery, your activity level will  decrease. You will need to elevate your foot ABOVE the level of your heart for a minimum of four days. The length of time necessary for the swelling to go down, and for your wounds to heal properly depends greatly on your efforts here. Elevation is extremely important to avoid compromising the blood supply to your foot. Remember when your foot is down it will swell, which will increase pain and slow healing. Wiggle your toes frequently if possible.   If you go home with a regional block, (a type of anesthesia) the foot and leg will be numb. Think of ways to get into your house and around the house until the block wears off.   Keep in mind that it may be a legal issue if you drive while in a cast or splint, especially when the splint is on the right foot. You may call the Department of [employer] to schedule a road test if you have adaptive equipment applied to your car.   The amount of weight you are allowed to bear on your foot will be written on your discharge sheet filled out at the time of surgery. The following is an explanation of the possibilities:       COVID-19 Policies  [facility] has the following policies when it comes to ELECTIVE surgery  No elective surgery requiring anesthesia until 7 weeks after a patient tested positive for COVID-19   No elective surgery requiring anesthesia until 3 months after a patient was hospitalized for COVID-19      Non-weight bearing:   You are to put NO weight whatsoever on your foot. When using crutches or a walker, your foot should not touch the ground, except when you are standing. Then, it may rest on the ground. If you are to be non-weight bearing, and you are not compliant, you could compromise the surgery.     Some of our patients have been requesting prescriptions for a roll-a-bout knee scooter. [employer] and other insurances have been denying these claims, and you may either have to rent one or pay out of pocket to purchase one.  THIS SHOULD BE  PURCHASED PRIOR TO THE SURGERY AND YOU SHOULD BRING IT WITH YOU THE DAY OF THE SURGERY TO AIDE IN GETTING FROM THE CAR INTO THE HOUSE AFTER SURGERY.     no

## 2025-07-10 NOTE — PHYSICAL THERAPY INITIAL EVALUATION ADULT - PERTINENT HX OF CURRENT PROBLEM, REHAB EVAL
Patient is an 85 year old female who presented to ED with c.o unsteady gait. Head/Brain CT (-) for CVA.
none